# Patient Record
Sex: FEMALE | Race: WHITE | NOT HISPANIC OR LATINO | Employment: OTHER | URBAN - METROPOLITAN AREA
[De-identification: names, ages, dates, MRNs, and addresses within clinical notes are randomized per-mention and may not be internally consistent; named-entity substitution may affect disease eponyms.]

---

## 2017-01-16 DIAGNOSIS — I10 ESSENTIAL HYPERTENSION WITH GOAL BLOOD PRESSURE LESS THAN 140/90: Primary | ICD-10-CM

## 2017-01-16 NOTE — TELEPHONE ENCOUNTER
Atenolol 50 mg  Last Written Prescription Date: 06/29/16  Last Fill Quantity: 90, # refills: 1    Last Office Visit with [facility], [facility] or [facility] prescribing provider:  11/29/16   Future Office Visit:    NA    BP Readings from Last 3 Encounters:   11/29/16 138/68   09/06/16 132/60   08/26/16 122/82       Benazepril 20 mg          Last Written Prescription Date: 06/29/16  Last Fill Quantity: 180, # refills: 1  Last Office Visit with [facility], [facility] or [facility] prescribing provider: 11/29/16       POTASSIUM   Date Value Ref Range Status   10/26/2016 4.8 3.4 - 5.3 mmol/L Final     CREATININE   Date Value Ref Range Status   10/26/2016 1.13* 0.52 - 1.04 mg/dL Final     BP Readings from Last 3 Encounters:   11/29/16 138/68   09/06/16 132/60   08/26/16 122/82

## 2017-01-19 RX ORDER — ATENOLOL 50 MG/1
50 TABLET ORAL EVERY MORNING
Qty: 90 TABLET | Refills: 1 | Status: SHIPPED | OUTPATIENT
Start: 2017-01-19 | End: 2018-08-17

## 2017-01-19 RX ORDER — BENAZEPRIL HYDROCHLORIDE 20 MG/1
40 TABLET ORAL DAILY
Qty: 180 TABLET | Refills: 1 | Status: ON HOLD | OUTPATIENT
Start: 2017-01-19 | End: 2018-08-21

## 2017-01-19 NOTE — TELEPHONE ENCOUNTER
Prescription approved per [facility] Refill Protocol.  Pauline Coker, RN  Triage Nurse

## 2017-02-25 ENCOUNTER — OFFICE VISIT (OUTPATIENT)
Dept: URGENT CARE | Facility: URGENT CARE | Age: 82
End: 2017-02-25
Payer: MEDICARE

## 2017-02-25 VITALS
SYSTOLIC BLOOD PRESSURE: 118 MMHG | OXYGEN SATURATION: 98 % | TEMPERATURE: 98.9 F | HEART RATE: 66 BPM | DIASTOLIC BLOOD PRESSURE: 72 MMHG

## 2017-02-25 DIAGNOSIS — H61.21 IMPACTED CERUMEN OF RIGHT EAR: ICD-10-CM

## 2017-02-25 DIAGNOSIS — H69.93 ETD (EUSTACHIAN TUBE DYSFUNCTION), BILATERAL: Primary | ICD-10-CM

## 2017-02-25 PROCEDURE — 99213 OFFICE O/P EST LOW 20 MIN: CPT | Performed by: PHYSICIAN ASSISTANT

## 2017-02-25 RX ORDER — FLUTICASONE PROPIONATE 50 MCG
2 SPRAY, SUSPENSION (ML) NASAL DAILY
Qty: 1 BOTTLE | Refills: 3 | Status: SHIPPED | OUTPATIENT
Start: 2017-02-25 | End: 2018-08-17

## 2017-02-25 NOTE — MR AVS SNAPSHOT
"              After Visit Summary   2017    Delmis Quarles    MRN: [id number]           Patient Information     Date Of Birth          1924        Visit Information        Provider Department      2017 4:30 PM Tiffany Snyder PA-C [facility] [place]        Today's Diagnoses     ETD (eustachian tube dysfunction), bilateral    -  1    Impacted cerumen of right ear           Follow-ups after your visit        Who to contact     If you have questions or need follow up information about today's clinic visit or your schedule please contact [facility] directly at [phone].  Normal or non-critical lab and imaging results will be communicated to you by [employer], letter or phone within 4 business days after the clinic has received the results. If you do not hear from us within 7 days, please contact the clinic through [employer] or phone. If you have a critical or abnormal lab result, we will notify you by phone as soon as possible.  Submit refill requests through [employer] or call your pharmacy and they will forward the refill request to us. Please allow 3 business days for your refill to be completed.          Additional Information About Your Visit        MyChart Information     [employer] lets you send messages to your doctor, view your test results, renew your prescriptions, schedule appointments and more. To sign up, go to [email or web address] . Click on \"Log in\" on the left side of the screen, which will take you to the Welcome page. Then click on \"Sign up Now\" on the right side of the page.     You will be asked to enter the access code listed below, as well as some personal information. Please follow the directions to create your username and password.     Your access code is: [email or web address]  Expires: 2017 11:44 AM     Your access code will  in 90 days. If you need help or a new code, please call your [place] clinic or [phone].        Care EveryWhere ID     " This is your Care EveryWhere ID. This could be used by other organizations to access your [place] medical records  [id number]        Your Vitals Were     Pulse Temperature Pulse Oximetry             66 98.9  F (37.2  C) (Oral) 98%          Blood Pressure from Last 3 Encounters:   02/25/17 118/72   11/29/16 138/68   09/06/16 132/60    Weight from Last 3 Encounters:   11/29/16 157 lb 12.8 oz (71.6 kg)   09/06/16 157 lb 1.6 oz (71.3 kg)   08/26/16 156 lb (70.8 kg)              Today, you had the following     No orders found for display         Today's Medication Changes          These changes are accurate as of: 2/25/17 11:59 PM.  If you have any questions, ask your nurse or doctor.               These medicines have changed or have updated prescriptions.        Dose/Directions    * fluticasone 50 MCG/ACT spray   Commonly known as:  FLONASE   This may have changed:  Another medication with the same name was added. Make sure you understand how and when to take each.   Used for:  Post-nasal drainage   Changed by:  Rhonda Aj MD        Dose:  2 spray   Spray 2 sprays into both nostrils daily   Quantity:  1 Bottle   Refills:  3       * fluticasone 50 MCG/ACT spray   Commonly known as:  FLONASE   This may have changed:  You were already taking a medication with the same name, and this prescription was added. Make sure you understand how and when to take each.   Used for:  ETD (eustachian tube dysfunction), bilateral   Changed by:  Tiffany Snyder PA-C        Dose:  2 spray   Spray 2 sprays into both nostrils daily   Quantity:  1 Bottle   Refills:  3       * Notice:  This list has 2 medication(s) that are the same as other medications prescribed for you. Read the directions carefully, and ask your doctor or other care provider to review them with you.         Where to get your medicines      These medications were sent to [facility] [address] [place], [place] - [address]  [address],  [facility] [address]     Phone:  [phone]     fluticasone 50 MCG/ACT spray                Primary Care Provider Office Phone # Fax #    Rhonda Aj [phone]       [facility] [address]  [facility] [address]        Thank you!     Thank you for choosing [facility]  for your care. Our goal is always to provide you with excellent care. Hearing back from our patients is one way we can continue to improve our services. Please take a few minutes to complete the written survey that you may receive in the mail after your visit with us. Thank you!             Your Updated Medication List - Protect others around you: Learn how to safely use, store and throw away your medicines at www.disposemymeds.org.          This list is accurate as of: 2/25/17 11:59 PM.  Always use your most recent med list.                   Brand Name Dispense Instructions for use    aspirin 81 MG EC tablet     90 tablet    Take 1 tablet (81 mg) by mouth daily       atenolol 50 MG tablet    TENORMIN    90 tablet    Take 1 tablet (50 mg) by mouth every morning       benazepril 20 MG tablet    LOTENSIN    180 tablet    Take 2 tablets (40 mg) by mouth daily May split dose if desired or take them both at one time.       bumetanide 1 MG tablet    BUMEX    45 tablet    One half tablet by mouth daily       calcium + D 600-200 MG-UNIT Tabs   Generic drug:  calcium carbonate-vitamin D      Take 2 tablets by mouth daily.       estradiol 10 MCG Tabs vaginal tablet    VAGIFEM    24 tablet    Place 1 tablet (10 mcg) vaginally twice a week       fexofenadine 180 MG tablet    ALLEGRA    90 tablet    Take 1 tablet by mouth daily.       * fluticasone 50 MCG/ACT spray    FLONASE    1 Bottle    Spray 2 sprays into both nostrils daily       * fluticasone 50 MCG/ACT spray    FLONASE    1 Bottle    Spray 2 sprays into both nostrils daily       gabapentin 100 MG capsule    NEURONTIN    450 capsule    200 mg  in the morning and 100 mg at noon  200 mg in the evening       latanoprost 0.005 % ophthalmic solution    XALATAN     Place 1 drop Into the left eye At Bedtime.       MULTIVITAMIN PO      None Entered       OMEGA 3 PO      DAILY       omeprazole 20 MG CR capsule    priLOSEC    90 capsule    Take 1 capsule (20 mg) by mouth every morning ONE DAILY       ondansetron 4 MG ODT tab    ZOFRAN ODT    20 tablet    Take 1-2 tablets (4-8 mg) by mouth 3 times daily (before meals)       sodium chloride 0.65 % nasal spray    OCEAN    44 mL    Spray 1 spray into both nostrils daily as needed for congestion       spironolactone 25 MG tablet    ALDACTONE    90 tablet    Take 1 tablet (25 mg) by mouth daily (with lunch)       timolol 0.5 % ophthalmic solution    TIMOPTIC     Place 1 drop into both eyes daily       vitamin D 1000 UNITS capsule      2 CAPSULE EVERY DAY       * Notice:  This list has 2 medication(s) that are the same as other medications prescribed for you. Read the directions carefully, and ask your doctor or other care provider to review them with you.

## 2017-02-25 NOTE — NURSING NOTE
"Chief Complaint   Patient presents with     Urgent Care     Ear Problem     pt reports feeling head pressure/congestion, feels like her head is floating x 1 day.  Possible ear impaction.       Initial /72 (BP Location: Right arm, Patient Position: Chair, Cuff Size: Adult Regular)  Pulse 66  Temp 98.9  F (37.2  C) (Oral)  SpO2 98% Estimated body mass index is 28.86 kg/(m^2) as calculated from the following:    Height as of 2/19/16: 5' 2\" (1.575 m).    Weight as of 11/29/16: 157 lb 12.8 oz (71.6 kg).  Medication Reconciliation: complete      Monica Ford CMA                                2/25/2017 4:47 PM     "

## 2017-03-02 NOTE — PROGRESS NOTES
SUBJECTIVE:  Delmis Quarles is a 89 year old female who presents with bilateral ear fullness and pressure for 1 day(s).  Feels slightly congested in her head.  Denies dizziness, HA or vision changes.  Wants to make sure that no ear infection or wax  Severity: mild   Timing:gradual onset and still present  Additional symptoms include none.      History of recurrent otitis: no    Past Medical History   Diagnosis Date     Arthritis      Blood transfusion      Chronic gastric ulcer without mention of hemorrhage, perforation, without mention of obstruction 5/5/2006     EGD, NSAID induced; PPI indefinitely,      Diffuse cystic mastopathy      Embolism and thrombosis of unspecified site 1992     Post phlebitic syndrome; Jobst stocking     Essential hypertension, benign 1972     Neuropathy (H)      FEET AND HANDS     Osteoporosis, unspecified 1/00     Dexa done in [place], [place]; Fosamax started 1/00     Other lymphedema      Stricture and stenosis of esophagus 2000     dilitation      Current Outpatient Prescriptions   Medication Sig Dispense Refill     fluticasone (FLONASE) 50 MCG/ACT spray Spray 2 sprays into both nostrils daily 1 Bottle 3     benazepril (LOTENSIN) 20 MG tablet Take 2 tablets (40 mg) by mouth daily May split dose if desired or take them both at one time. 180 tablet 1     atenolol (TENORMIN) 50 MG tablet Take 1 tablet (50 mg) by mouth every morning 90 tablet 1     bumetanide (BUMEX) 1 MG tablet One half tablet by mouth daily 45 tablet 1     spironolactone (ALDACTONE) 25 MG tablet Take 1 tablet (25 mg) by mouth daily (with lunch) 90 tablet 1     ondansetron (ZOFRAN ODT) 4 MG ODT tab Take 1-2 tablets (4-8 mg) by mouth 3 times daily (before meals) 20 tablet 1     gabapentin (NEURONTIN) 100 MG capsule 200 mg in the morning and 100 mg at noon  200 mg in the evening 450 capsule 3     fluticasone (FLONASE) 50 MCG/ACT nasal spray Spray 2 sprays into both nostrils daily 1 Bottle 3     estradiol (VAGIFEM) 10 MCG  TABS Place 1 tablet (10 mcg) vaginally twice a week 24 tablet 1     sodium chloride (OCEAN) 0.65 % nasal spray Spray 1 spray into both nostrils daily as needed for congestion 44 mL 1     omeprazole (PRILOSEC) 20 MG capsule Take 1 capsule (20 mg) by mouth every morning ONE DAILY 90 capsule 2     timolol (TIMOPTIC) 0.5 % ophthalmic solution Place 1 drop into both eyes daily       aspirin 81 MG EC tablet Take 1 tablet (81 mg) by mouth daily 90 tablet 3     latanoprost (XALATAN) 0.005 % ophthalmic solution Place 1 drop Into the left eye At Bedtime.       Calcium Carbonate-Vitamin D (CALCIUM + D) 600-200 MG-UNIT per tablet Take 2 tablets by mouth daily.       fexofenadine (ALLEGRA) 180 MG tablet Take 1 tablet by mouth daily. 90 tablet 9     MULTIVITAMIN OR None Entered       OMEGA 3 PO DAILY       VITAMIN D 1000 UNIT PO CAPS 2 CAPSULE EVERY DAY  0     Social History   Substance Use Topics     Smoking status: Never Smoker     Smokeless tobacco: Never Used     Alcohol use No       ROS:   Review of systems negative except as stated above.    OBJECTIVE:  /72 (BP Location: Right arm, Patient Position: Chair, Cuff Size: Adult Regular)  Pulse 66  Temp 98.9  F (37.2  C) (Oral)  SpO2 98%   EXAM:  The right TM is not visualized secondary to cerumen     The right auditory canal is obstructed with cerumen  The left TM is air/fluid interface  The left auditory canal is normal and without drainage, edema or erythema  Oropharynx exam is normal: no lesions, erythema, adenopathy or exudate.  GENERAL: no acute distress  EYES: EOMI,  PERRL, conjunctiva clear  NECK: supple, non-tender to palpation, no adenopathy noted  RESP: lungs clear to auscultation - no rales, rhonchi or wheezes  CV: regular rates and rhythm, normal S1 S2, no murmur noted  SKIN: no suspicious lesions or rashes     EAR WASH:  Right ear wash performed and TM with no signs of infection.  Clear fluid noted in right ear    ASSESSMENT:  Cerumen impaction and Eustachin  tube dysfunction    PLAN:  No signs of infection. Supportive cares, hot packs and steam.  Fu with PCP as needed .

## 2017-03-31 DIAGNOSIS — N95.2 ATROPHIC VAGINITIS: ICD-10-CM

## 2017-03-31 RX ORDER — ESTRADIOL 10 UG/1
10 INSERT VAGINAL
Qty: 24 TABLET | Refills: 1 | Status: SHIPPED | OUTPATIENT
Start: 2017-04-03 | End: 2017-09-17

## 2017-03-31 NOTE — TELEPHONE ENCOUNTER
estradiol (VAGIFEM) 10 MCG      Last Written Prescription Date: 10/17/16  Last Fill Quantity: 24, # refills: 1  Last Office Visit with [facility], [facility] or [facility] prescribing provider: 11/29/16       BP Readings from Last 3 Encounters:   02/25/17 118/72   11/29/16 138/68   09/06/16 132/60     Date of last Breast Exam: 11/27/12

## 2017-03-31 NOTE — TELEPHONE ENCOUNTER
Prescription approved per [facility] Refill Protocol.  Pauline Coker, RN  Triage Nurse

## 2017-05-14 DIAGNOSIS — K22.2 STRICTURE AND STENOSIS OF ESOPHAGUS: ICD-10-CM

## 2017-05-15 NOTE — TELEPHONE ENCOUNTER
omeprazole (PRILOSEC) 20 MG      Last Written Prescription Date: 6/29/16  Last Fill Quantity: 90,  # refills: 2   Last Office Visit with [facility], [facility] or [facility] prescribing provider: 11/29/16

## 2017-05-16 NOTE — TELEPHONE ENCOUNTER
Prescription approved per [facility] Refill Protocol.    Kaila Lyons RN

## 2017-06-06 DIAGNOSIS — G62.9 NEUROPATHY: ICD-10-CM

## 2017-06-07 RX ORDER — GABAPENTIN 100 MG/1
CAPSULE ORAL
Qty: 450 CAPSULE | Refills: 1 | OUTPATIENT
Start: 2017-06-07

## 2017-06-07 NOTE — TELEPHONE ENCOUNTER
Gabapentin 100 mg 2 at am, 1 at noon & 2 at pm      Last Written Prescription Date:  11/29/16  Last Fill Quantity: 450,   # refills: 3  Last Office Visit with [facility], [facility] or [facility] prescribing provider: 02/25/17  Future Office visit:       Routing refill request to provider for review/approval because:  Drug not on the [facility], [facility] or  [facility] refill protocol or controlled substance    Beth, RN  Triage Nurse

## 2017-06-13 RX ORDER — GABAPENTIN 100 MG/1
CAPSULE ORAL
Qty: 450 CAPSULE | Refills: 1 | Status: SHIPPED | OUTPATIENT
Start: 2017-06-13 | End: 2024-01-01

## 2017-06-26 DIAGNOSIS — I10 ESSENTIAL HYPERTENSION WITH GOAL BLOOD PRESSURE LESS THAN 140/90: ICD-10-CM

## 2017-06-27 NOTE — TELEPHONE ENCOUNTER
bumetanide (BUMEX) 1 MG tablet      Last Written Prescription Date: 12/26/16  Last Fill Quantity: 45, # refills: 1  Last Office Visit with [facility], [facility] or [facility] prescribing provider: 11/29/16       Potassium   Date Value Ref Range Status   10/26/2016 4.8 3.4 - 5.3 mmol/L Final     Creatinine   Date Value Ref Range Status   10/26/2016 1.13 (H) 0.52 - 1.04 mg/dL Final     BP Readings from Last 3 Encounters:   02/25/17 118/72   11/29/16 138/68   09/06/16 132/60

## 2017-06-29 RX ORDER — BUMETANIDE 1 MG/1
TABLET ORAL
Qty: 45 TABLET | Refills: 0 | Status: SHIPPED | OUTPATIENT
Start: 2017-06-29 | End: 2017-09-26

## 2017-06-29 NOTE — TELEPHONE ENCOUNTER
Medication is being filled for 1 time refill only due to:  Patient needs to be seen because it has been more than one year since last visit. LOV for this medication 6/29/2016    Kaila Lyons RN

## 2017-06-29 NOTE — TELEPHONE ENCOUNTER
Patient is seeing a Doctor at the facility that she is now living at and they will continue to prescribe her medications.  -Bridie Waggoner

## 2017-08-23 DIAGNOSIS — R09.82 POST-NASAL DRAINAGE: ICD-10-CM

## 2017-08-25 RX ORDER — FLUTICASONE PROPIONATE 50 MCG
SPRAY, SUSPENSION (ML) NASAL
Qty: 16 ML | Refills: 3 | Status: SHIPPED | OUTPATIENT
Start: 2017-08-25 | End: 2017-12-17

## 2017-08-25 NOTE — TELEPHONE ENCOUNTER
Medication Detail      Disp Refills Start End CARLOS   fluticasone (FLONASE) 50 MCG/ACT nasal spray 1 Bottle 3 11/29/2016  No   Sig: Spray 2 sprays into both nostrils daily       Last Office Visit with [facility], [facility] or [facility] prescribing provider: 11/29/16

## 2017-08-25 NOTE — TELEPHONE ENCOUNTER
Prescription approved per [facility] Refill Protocol.  Pauline Coker, RN  Triage Nurse

## 2017-09-17 DIAGNOSIS — N95.2 ATROPHIC VAGINITIS: ICD-10-CM

## 2017-09-18 NOTE — TELEPHONE ENCOUNTER
estradiol (VAGIFEM) 10 MCG TABS vaginal      Last Written Prescription Date: 4/3/17  Last Fill Quantity: 24, # refills: 1  Last Office Visit with [facility], [facility] or [facility] prescribing provider: 11/29/16       BP Readings from Last 3 Encounters:   02/25/17 118/72   11/29/16 138/68   09/06/16 132/60     Date of last Breast Exam: 11/27/12

## 2017-09-19 RX ORDER — ESTRADIOL 10 UG/1
INSERT VAGINAL
Qty: 24 TABLET | Refills: 0 | Status: SHIPPED | OUTPATIENT
Start: 2017-09-19 | End: 2024-01-01

## 2017-09-26 DIAGNOSIS — I10 ESSENTIAL HYPERTENSION WITH GOAL BLOOD PRESSURE LESS THAN 140/90: ICD-10-CM

## 2017-09-27 NOTE — TELEPHONE ENCOUNTER
Routing refill request to provider for review/approval because:  Lucero given x1 and patient did not follow up, please advise    Please advise.     Carlee FONTANA RN, BSN, PHN  Annalise Barajas RN

## 2017-09-27 NOTE — TELEPHONE ENCOUNTER
bumetanide (BUMEX) 1 MG tablet      Last Written Prescription Date: 6/29/2017  Last Fill Quantity: 45, # refills: 0  Last Office Visit with [facility], [facility] or [facility] prescribing provider: 11/29/2016       Potassium   Date Value Ref Range Status   10/26/2016 4.8 3.4 - 5.3 mmol/L Final     Creatinine   Date Value Ref Range Status   10/26/2016 1.13 (H) 0.52 - 1.04 mg/dL Final     BP Readings from Last 3 Encounters:   02/25/17 118/72   11/29/16 138/68   09/06/16 132/60

## 2017-09-28 RX ORDER — BUMETANIDE 1 MG/1
TABLET ORAL
Qty: 45 TABLET | Refills: 0 | Status: SHIPPED | OUTPATIENT
Start: 2017-09-28 | End: 2017-10-20

## 2017-09-28 NOTE — TELEPHONE ENCOUNTER
/Lat OV with PCP 1// but did not address diuretic use. That was addressed at  She is over due for appt..   appt. Will provide #10 tablets/ which is 20 days supply of meds.  Please assist with scheduling appt.  Unable to fill at mail order since it is a partial supply. Rx printed. Please check where pt would like local rx sent.

## 2017-09-28 NOTE — TELEPHONE ENCOUNTER
Looks like caps lock issue in last note.  Intended to authorize #10. Rx printed ( controlled Rx).  Will change quantity to #10 as intended and please check with pt as to local pharmacy to use.  Thanks.

## 2017-09-29 NOTE — TELEPHONE ENCOUNTER
Called patient to ask where she would like this sent to for #10.  She would like sent to [facility] in [facility] on [address].  This is sent.   Pauline Coker, RN  Triage Nurse

## 2017-10-20 DIAGNOSIS — I10 ESSENTIAL HYPERTENSION WITH GOAL BLOOD PRESSURE LESS THAN 140/90: ICD-10-CM

## 2017-10-23 RX ORDER — BUMETANIDE 1 MG/1
TABLET ORAL
Qty: 45 TABLET | Refills: 0 | Status: SHIPPED | OUTPATIENT
Start: 2017-10-23 | End: 2018-01-23

## 2017-10-23 NOTE — TELEPHONE ENCOUNTER
Prescription approved per [facility] Refill Protocol.  90 days x1 - coming up on one year for labs and one year for OV is next month.  Noted in pharmacy comments.

## 2017-11-12 DIAGNOSIS — K22.2 STRICTURE AND STENOSIS OF ESOPHAGUS: ICD-10-CM

## 2017-11-14 NOTE — TELEPHONE ENCOUNTER
Routing refill request to provider for review/approval because:  Osteoporosis is on problems list, per [facility] protocol, route to provider for review.     Pt is coming due for OV with PCP, pharmacy note added to script.     Marianela Arteaga RN -- [facility]

## 2017-11-16 ENCOUNTER — HOSPITAL ENCOUNTER (EMERGENCY)
Facility: CLINIC | Age: 82
Discharge: HOME | End: 2017-11-16
Attending: EMERGENCY MEDICINE | Admitting: EMERGENCY MEDICINE
Payer: MEDICARE

## 2017-11-16 ENCOUNTER — APPOINTMENT (OUTPATIENT)
Dept: CT IMAGING | Facility: CLINIC | Age: 82
End: 2017-11-16
Attending: EMERGENCY MEDICINE
Payer: MEDICARE

## 2017-11-16 ENCOUNTER — APPOINTMENT (OUTPATIENT)
Dept: GENERAL RADIOLOGY | Facility: CLINIC | Age: 82
End: 2017-11-16
Attending: EMERGENCY MEDICINE
Payer: MEDICARE

## 2017-11-16 VITALS
HEART RATE: 68 BPM | DIASTOLIC BLOOD PRESSURE: 68 MMHG | TEMPERATURE: 98 F | SYSTOLIC BLOOD PRESSURE: 164 MMHG | RESPIRATION RATE: 16 BRPM | OXYGEN SATURATION: 99 %

## 2017-11-16 DIAGNOSIS — S70.02XA CONTUSION OF LEFT HIP, INITIAL ENCOUNTER: ICD-10-CM
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
## 2017-11-16 DIAGNOSIS — I10 ESSENTIAL HYPERTENSION: ICD-10-CM

## 2017-11-16 LAB
ALBUMIN UR-MCNC: NEGATIVE MG/DL
ANION GAP SERPL CALCULATED.3IONS-SCNC: 4 MMOL/L (ref 3–14)
APPEARANCE UR: NORMAL
BASOPHILS # BLD AUTO: 0.1 10E9/L (ref 0–0.2)
BASOPHILS NFR BLD AUTO: 1.1 %
BILIRUB UR QL STRIP: NEGATIVE
BUN SERPL-MCNC: 21 MG/DL (ref 7–30)
CALCIUM SERPL-MCNC: 8.9 MG/DL (ref 8.5–10.1)
CHLORIDE SERPL-SCNC: 98 MMOL/L (ref 94–109)
CO2 SERPL-SCNC: 30 MMOL/L (ref 20–32)
COLOR UR AUTO: YELLOW
CREAT SERPL-MCNC: 0.88 MG/DL (ref 0.52–1.04)
DIFFERENTIAL METHOD BLD: NORMAL
EOSINOPHIL # BLD AUTO: 0.4 10E9/L (ref 0–0.7)
EOSINOPHIL NFR BLD AUTO: 5.5 %
ERYTHROCYTE [DISTWIDTH] IN BLOOD BY AUTOMATED COUNT: 14.2 % (ref 10–15)
GFR SERPL CREATININE-BSD FRML MDRD: 60 ML/MIN/1.7M2
GLUCOSE BLDC GLUCOMTR-MCNC: 109 MG/DL (ref 70–99)
GLUCOSE SERPL-MCNC: 101 MG/DL (ref 70–99)
GLUCOSE UR STRIP-MCNC: NEGATIVE MG/DL
HCT VFR BLD AUTO: 38.5 % (ref 35–47)
HGB BLD-MCNC: 12.7 G/DL (ref 11.7–15.7)
HGB UR QL STRIP: NEGATIVE
IMM GRANULOCYTES # BLD: 0 10E9/L (ref 0–0.4)
IMM GRANULOCYTES NFR BLD: 0.4 %
INR PPP: 1.07 (ref 0.86–1.14)
INTERPRETATION ECG - MUSE: NORMAL
KETONES UR STRIP-MCNC: NEGATIVE MG/DL
LACTATE BLD-SCNC: 0.8 MMOL/L (ref 0.7–2)
LEUKOCYTE ESTERASE UR QL STRIP: NEGATIVE
LYMPHOCYTES # BLD AUTO: 1.4 10E9/L (ref 0.8–5.3)
LYMPHOCYTES NFR BLD AUTO: 18.6 %
MCH RBC QN AUTO: 29.9 PG (ref 26.5–33)
MCHC RBC AUTO-ENTMCNC: 33 G/DL (ref 31.5–36.5)
MCV RBC AUTO: 91 FL (ref 78–100)
MONOCYTES # BLD AUTO: 0.6 10E9/L (ref 0–1.3)
MONOCYTES NFR BLD AUTO: 8.6 %
NEUTROPHILS # BLD AUTO: 4.8 10E9/L (ref 1.6–8.3)
NEUTROPHILS NFR BLD AUTO: 65.8 %
NITRATE UR QL: NEGATIVE
NRBC # BLD AUTO: 0 10*3/UL
NRBC BLD AUTO-RTO: 0 /100
PH UR STRIP: 7 PH (ref 5–7)
PLATELET # BLD AUTO: 192 10E9/L (ref 150–450)
POTASSIUM SERPL-SCNC: 3.8 MMOL/L (ref 3.4–5.3)
RBC # BLD AUTO: 4.25 10E12/L (ref 3.8–5.2)
RBC #/AREA URNS AUTO: 1 /HPF (ref 0–2)
SODIUM SERPL-SCNC: 132 MMOL/L (ref 133–144)
SOURCE: NORMAL
SP GR UR STRIP: 1.01 (ref 1–1.03)
TROPONIN I BLD-MCNC: 0.01 UG/L (ref 0–0.1)
TSH SERPL DL<=0.005 MIU/L-ACNC: 2.72 MU/L (ref 0.4–4)
UROBILINOGEN UR STRIP-MCNC: 0 MG/DL (ref 0–2)
WBC # BLD AUTO: 7.3 10E9/L (ref 4–11)
WBC #/AREA URNS AUTO: 1 /HPF (ref 0–2)

## 2017-11-16 PROCEDURE — 87040 BLOOD CULTURE FOR BACTERIA: CPT | Mod: 91 | Performed by: EMERGENCY MEDICINE

## 2017-11-16 PROCEDURE — 73552 X-RAY EXAM OF FEMUR 2/>: CPT | Mod: LT

## 2017-11-16 PROCEDURE — 70450 CT HEAD/BRAIN W/O DYE: CPT

## 2017-11-16 PROCEDURE — 71010 XR CHEST 1 VW: CPT

## 2017-11-16 PROCEDURE — 84484 ASSAY OF TROPONIN QUANT: CPT

## 2017-11-16 PROCEDURE — 72170 X-RAY EXAM OF PELVIS: CPT

## 2017-11-16 PROCEDURE — 84443 ASSAY THYROID STIM HORMONE: CPT | Performed by: EMERGENCY MEDICINE

## 2017-11-16 PROCEDURE — 99285 EMERGENCY DEPT VISIT HI MDM: CPT | Mod: 25

## 2017-11-16 PROCEDURE — 93005 ELECTROCARDIOGRAM TRACING: CPT

## 2017-11-16 PROCEDURE — 83605 ASSAY OF LACTIC ACID: CPT | Performed by: EMERGENCY MEDICINE

## 2017-11-16 PROCEDURE — 85610 PROTHROMBIN TIME: CPT | Performed by: EMERGENCY MEDICINE

## 2017-11-16 PROCEDURE — 80048 BASIC METABOLIC PNL TOTAL CA: CPT | Performed by: EMERGENCY MEDICINE

## 2017-11-16 PROCEDURE — 36415 COLL VENOUS BLD VENIPUNCTURE: CPT | Performed by: EMERGENCY MEDICINE

## 2017-11-16 PROCEDURE — 85025 COMPLETE CBC W/AUTO DIFF WBC: CPT | Performed by: EMERGENCY MEDICINE

## 2017-11-16 PROCEDURE — 00000146 ZZHCL STATISTIC GLUCOSE BY METER IP

## 2017-11-16 PROCEDURE — 81001 URINALYSIS AUTO W/SCOPE: CPT | Performed by: EMERGENCY MEDICINE

## 2017-11-16 RX ORDER — HYDRALAZINE HYDROCHLORIDE 20 MG/ML
10 INJECTION INTRAMUSCULAR; INTRAVENOUS ONCE
Status: DISCONTINUED | OUTPATIENT
Start: 2017-11-16 | End: 2017-11-16 | Stop reason: HOSPADM

## 2017-11-16 RX ORDER — LIDOCAINE 40 MG/G
CREAM TOPICAL
Status: DISCONTINUED | OUTPATIENT
Start: 2017-11-16 | End: 2017-11-16 | Stop reason: HOSPADM

## 2017-11-16 ASSESSMENT — ENCOUNTER SYMPTOMS
BACK PAIN: 0
FACIAL ASYMMETRY: 0
VOMITING: 0
DIARRHEA: 0
ARTHRALGIAS: 0

## 2017-11-16 NOTE — ED NOTES
Bed: ED12  Expected date: 11/16/17  Expected time: 7:10 AM  Means of arrival: Ambulance  Comments:  DASHA 89 YO f

## 2017-11-16 NOTE — ED AVS SNAPSHOT
[facility] Emergency Department    [address] [place] [address]    Phone:  [phone]    Fax:  [phone]                                       Delmis Quarles   MRN: [id number]    Department:  [facility] Emergency Department   Date of Visit:  11/16/2017           Patient Information     Date Of Birth          9/5/1924        Your diagnoses for this visit were:     Fall, initial encounter     Essential hypertension     Contusion of left hip, initial encounter        You were seen by Delgado Arrington MD.      Follow-up Information     Follow up with Rhonda Aj MD. Schedule an appointment as soon as possible for a visit in 5 days.    Specialty:  Internal Medicine    Contact information:    [address] [place] [address]  [phone]          Discharge Instructions       Discharge Instructions  Trauma    You were seen today for an injury due to some kind of trauma (crash, fall, etc.).  Some injuries may not show up until after you leave the Emergency Department.  It is important that you pay attention to these instructions and follow-up with your regular doctor as instructed.    Return to the Emergency Department right away if:    You have abdominal pain or bruises, chest pain, pain in a new area, or pain that is getting worse.    You get short of breath.    You develop a fever over 101 degrees.    You have weakness in your arms or legs.    You faint or you are very lightheaded.    You have any new symptoms, you are feeling weak or unusually ill, or something worries you.     Injuries to the brain are possible with any accident.  Return right away if you have confusion, vomiting more than once, difficulty walking or a headache that is getting worse. Bring a child or a person who can t talk back if they seem to be behaving in an abnormal way.      MORE INFORMATION:    General Injuries:    Aches and pains are usually worse the day after your  accident, but should not be severe, and should start getting better after that. Aches and pains are common in the neck and back.    Injuries from your accident may prevent you from working.  Follow-up with your regular doctor to get a work note and to find out how long you will not be able to work.    Pain medications or your injuries may make it unsafe for you to drive or operate machinery.    Use ice to injured areas for the first one or two days. Apply a bag of ice wrapped in a cloth for about 15 minutes at a time. You can do this as often as once an hour. Do not sleep with an ice pack, since it can burn you.     You can use non-prescription pain medicine, like Tylenol  (acetaminophen), Advil  (ibuprofen), Motrin  (ibuprofen), Nuprin  (ibuprofen) if your emergency doctor or your own doctor told you this is okay. Tylenol  (acetaminophen) is in many prescription medicines and non-prescription medicines--check all of your medicines to be sure you aren t taking more than 3000 mg per day.    Limit your activity for at least one or two days. Avoid doing things that hurt.    You need to see your doctor if any injured area is not back to normal in 1 week.    Car Accident:    If you have been on a backboard or had a neck collar on, this may make you stiff and sore.  This should get better in 1-2 days.  Return to the Emergency Department if the pain or discomfort is severe or gets worse.    Be careful of shards of glass on your body or in your belongings.    Fractures, Sprains, and Strains:    Return to the Emergency Department right away if your injured area gets more painful, if the splint or dressing seems to be too tight, if it gets numb or tingly past the injury, or if the area past the injury gets pale, blue, or cold.    Use your crutches if you were given them today. Don t put weight on the injured area until the pain is gone.    Keep the injured area above the level of your heart while laying or sitting down.  This  well help lessen the swelling (puffiness) and the pain.    You may use an elastic bandage (Ace  Wrap) if it makes you more comfortable. Wrap it just tight enough to provide mild compression, and loosen it if you get swelling past the bandage.    Note about X-rays: If you had x-rays done today, they were read by your emergency physician. They will also be read later by a radiologist. We will contact you if the radiologist thinks they show something different than the emergency physician did. Remember that there are some fractures (breaks in the bone) that can t be seen right away. Even if your x-rays today were normal, you must see your doctor in clinic to re-check.     Splints:    A splint put on in the Emergency Department is temporary. Your regular doctor or orthopedic doctor will remove it, and replace it with a cast or boot if needed.    Keep the splint dry. Cover it with a plastic bag when you wash. Even with a plastic bag, you still can t get in water or let water get right on it. If it does get wet, you should come back or see your doctor to have it replaced.    Do not put objects inside the splint to scratch.    If there is an elastic bandage (Ace  Wrap) holding the splint on this may be loosened a little to relieve pressure or pain.  If pain continues return to the Emergency Department right away.    Return if the splint starts cutting into your skin.    Do not remove your splint by yourself unless told to by your doctor. You can t take it off and put it back on again.     Wounds:    Infections can follow many injuries. Watch for fevers, redness spreading from the wound, pus or stitches that open up. Return here or see your doctor if these happen.    There can always be glass, wood, dirt or other things in any wound.  They won t always show up even on x-rays.  If a wound doesn t heal, this may be why, and it is important to follow-up with your regular doctor. Small pieces of glass or other materials may  work their way out on their own.    Cuts or scrapes may start to bleed after leaving the Emergency Department.  If this happens, hold pressure on the bleeding area with a clean cloth or put pressure over the bandage.  If the bleeding doesn t stop after you use constant pressure for   hour, you should return to the Emergency Department for further treatment.    Any bandage or dressing put on here should be removed in 12-24 hours, or as your doctor instructs. Remove the dressing sooner if it seems too tight or painful, or if it is getting numb, tingly, or pale past the dressing.    After you take off the dressing, wash the cut or scrape with soap and water once or twice a day.    Apply ointment like Bacitracin  (polypeptide antibiotic) to scrapes or cuts, and keep them covered with a Band-Aid  or gauze if possible, until they heal up or until your stitches are taken out.    Dermabond  or Steri-Strips  should be left alone and will come off by themselves.  Dissolving stitches should go away or fall out within about a week.    Regular stitches need to be taken out by your doctor in clinic.  Call today and schedule an appointment.  Leave your stitches in for as long as you were told today.    Most injuries are preventable!  As your local emergency physicians, we encourage you to:    Wear your seat belt.    Do not talk on your cell phone while driving.    Do not read or send text messages while driving.    Wear a bike or motorcycle helmet.    Wear a helmet while skiing and snowboarding.    Wear personal flotation devices at all times while on the water.    Always have your child in a car seat.    Do not allow children less than 12 years old to ride in the front seat.    Go to the CDC website to find more information on preventing injures:  http://www.cdc.gov/injury/index.html    If you were given a prescription for medicine here today, be sure to read all of the information (including the package insert) that comes with  your prescription.  This will include important information about the medicine, its side effects, and any warnings that you need to know about.  The pharmacist who fills the prescription can provide more information and answer questions you may have about the medicine.  If you have questions or concerns that the pharmacist cannot address, please call or return to the Emergency Department.     Opioid Medication Information    Pain medications are among the most commonly prescribed medicines, so we are including this information for all our patients. If you did not receive pain medication or get a prescription for pain medicine, you can ignore it.     You may have been given a prescription for an opioid (narcotic) pain medicine and/or have received a pain medicine while here in the Emergency Department. These medicines can make you drowsy or impaired. You must not drive, operate dangerous equipment, or engage in any other dangerous activities while taking these medications. If you drive while taking these medications, you could be arrested for DUI, or driving under the influence. Do not drink any alcohol while you are taking these medications.     Opioid pain medications can cause addiction. If you have a history of chemical dependency of any type, you are at a higher risk of becoming addicted to pain medications.  Only take these prescribed medications to treat your pain when all other options have been tried. Take it for as short a time and as few doses as possible. Store your pain pills in a secure place, as they are frequently stolen and provide a dangerous opportunity for children or visitors in your house to start abusing these powerful medications. We will not replace any lost or stolen medicine.  As soon as your pain is better, you should flush all your remaining medication.     Many prescription pain medications contain Tylenol  (acetaminophen), including Vicodin , Tylenol #3 , Norco , Lortab , and Percocet .   You should not take any extra pills of Tylenol  if you are using these prescription medications or you can get very sick.  Do not ever take more than 3000 mg of acetaminophen in any 24 hour period.    All opioids tend to cause constipation. Drink plenty of water and eat foods that have a lot of fiber, such as fruits, vegetables, prune juice, apple juice and high fiber cereal.  Take a laxative if you don t move your bowels at least every other day. Miralax , Milk of Magnesia, Colace , or Senna  can be used to keep you regular.      Remember that you can always come back to the Emergency Department if you are not able to see your regular doctor in the amount of time listed above, if you get any new symptoms, or if there is anything that worries you.      Discharge Instructions  Hypertension - High Blood Pressure    During you visit to the Emergency Department, your blood pressure was higher than the recommended blood pressure.  This may be related to stress, pain, medication or other temporary conditions. In these cases, your blood pressure may return to normal on its own. If you have a history of high blood pressure, you may need to have your doctor adjust your medications. Sometimes, your high measurement here may indicate that you have developed high blood pressure that will stay high unless it is treated. Sudden very high blood pressure can cause problems, but usually high blood pressure causes problems over months to years.      Blood pressure is almost never lowered in the Emergency Department, because studies have shown that lowering blood pressure too quickly is much more dangerous than leaving it alone.    You need to follow up with your doctor in 1-3 days to get your blood pressure rechecked.     Return to the Emergency Department if you start to have:    A severe headache.    Chest pain.    Shortness of breath.    Weakness or numbness that affects one part of the body.    Confusion.    Vision  changes.    Significant swelling of legs and/or eyes.    A reaction to any medication started in the Emergency Department.    What can I do to help myself?    Avoid alcohol.    Take any blood pressure medicine that you are prescribed.    Get a good night s sleep.    Lower your salt intake.    Exercise.    Lose weight.    Manage stress.    If blood pressure medication was started in the Emergency Department:    The medicine may not have an immediate effect. The body and brain determine what blood pressure you have. The medicine s job is to retrain the body s  thermostat  to a lower blood pressure.    You will need to follow up with your doctor to see how this medicine is working for you.  If you were given a prescription for medicine here today, be sure to read all of the information (including the package insert) that comes with your prescription.  This will include important information about the medicine, its side effects, and any warnings that you need to know about.  The pharmacist who fills the prescription can provide more information and answer questions you may have about the medicine.  If you have questions or concerns that the pharmacist cannot address, please call or return to the Emergency Department.   Opioid Medication Information    Pain medications are among the most commonly prescribed medicines, so we are including this information for all our patients. If you did not receive pain medication or get a prescription for pain medicine, you can ignore it.     You may have been given a prescription for an opioid (narcotic) pain medicine and/or have received a pain medicine while here in the Emergency Department. These medicines can make you drowsy or impaired. You must not drive, operate dangerous equipment, or engage in any other dangerous activities while taking these medications. If you drive while taking these medications, you could be arrested for DUI, or driving under the influence. Do not drink  any alcohol while you are taking these medications.     Opioid pain medications can cause addiction. If you have a history of chemical dependency of any type, you are at a higher risk of becoming addicted to pain medications.  Only take these prescribed medications to treat your pain when all other options have been tried. Take it for as short a time and as few doses as possible. Store your pain pills in a secure place, as they are frequently stolen and provide a dangerous opportunity for children or visitors in your house to start abusing these powerful medications. We will not replace any lost or stolen medicine.  As soon as your pain is better, you should flush all your remaining medication.     Many prescription pain medications contain Tylenol  (acetaminophen), including Vicodin , Tylenol #3 , Norco , Lortab , and Percocet .  You should not take any extra pills of Tylenol  if you are using these prescription medications or you can get very sick.  Do not ever take more than 3000 mg of acetaminophen in any 24 hour period.    All opioids tend to cause constipation. Drink plenty of water and eat foods that have a lot of fiber, such as fruits, vegetables, prune juice, apple juice and high fiber cereal.  Take a laxative if you don t move your bowels at least every other day. Miralax , Milk of Magnesia, Colace , or Senna  can be used to keep you regular.      Remember that you can always come back to the Emergency Department if you are not able to see your regular doctor in the amount of time listed above, if you get any new symptoms, or if there is anything that worries you.        24 Hour Appointment Hotline       To make an appointment at any [facility], call [phone] ([phone]). If you don't have a family doctor or clinic, we will help you find one. [facility] clinics are conveniently located to serve the needs of you and your family.             Review of your medicines      Our records show that  you are taking the medicines listed below. If these are incorrect, please call your family doctor or clinic.        Dose / Directions Last dose taken    aspirin 81 MG EC tablet   Dose:  81 mg   Quantity:  90 tablet        Take 1 tablet (81 mg) by mouth daily   Refills:  3        atenolol 50 MG tablet   Commonly known as:  TENORMIN   Dose:  50 mg   Quantity:  90 tablet        Take 1 tablet (50 mg) by mouth every morning   Refills:  1        benazepril 20 MG tablet   Commonly known as:  LOTENSIN   Dose:  40 mg   Quantity:  180 tablet        Take 2 tablets (40 mg) by mouth daily May split dose if desired or take them both at one time.   Refills:  1        bumetanide 1 MG tablet   Commonly known as:  BUMEX   Quantity:  45 tablet        TAKE 1/2 TABLET BY MOUTH DAILY   Refills:  0        calcium + D 600-200 MG-UNIT Tabs   Dose:  2 tablet   Generic drug:  calcium carbonate-vitamin D        Take 2 tablets by mouth daily.   Refills:  0        estradiol 10 MCG Tabs vaginal tablet   Commonly known as:  VAGIFEM   Quantity:  24 tablet        INSERT 1 TABLET VAGINALLY TWICE WEEKLY   Refills:  0        fexofenadine 180 MG tablet   Commonly known as:  ALLEGRA   Dose:  1 tablet   Quantity:  90 tablet        Take 1 tablet by mouth daily.   Refills:  9        * fluticasone 50 MCG/ACT spray   Commonly known as:  FLONASE   Dose:  2 spray   Quantity:  1 Bottle        Spray 2 sprays into both nostrils daily   Refills:  3        * fluticasone 50 MCG/ACT spray   Commonly known as:  FLONASE   Quantity:  16 mL        INSTILL 2 SPRAYS INTO BOTH NOSTRILS ONCE DAILY   Refills:  3        gabapentin 100 MG capsule   Commonly known as:  NEURONTIN   Quantity:  450 capsule        200 mg in the morning and 100 mg at noon  200 mg in the evening   Refills:  1        latanoprost 0.005 % ophthalmic solution   Commonly known as:  XALATAN   Dose:  1 drop        Place 1 drop Into the left eye At Bedtime.   Refills:  0        MULTIVITAMIN PO        None  Entered   Refills:  0        OMEGA 3 PO        DAILY   Refills:  0        omeprazole 20 MG CR capsule   Commonly known as:  priLOSEC   Quantity:  90 capsule        TAKE 1 CAPSULE EVERY MORNING DAILY   Refills:  0        ondansetron 4 MG ODT tab   Commonly known as:  ZOFRAN ODT   Dose:  4-8 mg   Quantity:  20 tablet        Take 1-2 tablets (4-8 mg) by mouth 3 times daily (before meals)   Refills:  1        sodium chloride 0.65 % nasal spray   Commonly known as:  OCEAN   Dose:  1 spray   Quantity:  44 mL        Spray 1 spray into both nostrils daily as needed for congestion   Refills:  1        spironolactone 25 MG tablet   Commonly known as:  ALDACTONE   Dose:  25 mg   Quantity:  90 tablet        Take 1 tablet (25 mg) by mouth daily (with lunch)   Refills:  1        timolol 0.5 % ophthalmic solution   Commonly known as:  TIMOPTIC   Dose:  1 drop        Place 1 drop into both eyes daily   Refills:  0        vitamin D 1000 UNITS capsule        2 CAPSULE EVERY DAY   Refills:  0        * Notice:  This list has 2 medication(s) that are the same as other medications prescribed for you. Read the directions carefully, and ask your doctor or other care provider to review them with you.            Procedures and tests performed during your visit     Procedure/Test Number of Times Performed    Basic metabolic panel 1    Blood culture 2    CBC with platelets differential 1    CT Head w/o Contrast 1    Cardiac Continuous Monitoring 1    EKG 12 lead 1    Femur XR,  2 views, left 1    Glucose by meter 1    Glucose monitor nursing POCT 1    INR 1    ISTAT troponin nursing POCT 1    Lactic acid whole blood 1    Pelvis XR, 1-2 views 1    Peripheral IV catheter 1    Pulse oximetry nursing 1    TSH 1    Troponin POCT 1    UA with Microscopic 1    Vital signs 1    XR Chest 1 View 1      Orders Needing Specimen Collection     None      Pending Results     Date and Time Order Name Status Description    11/16/2017 0800 Blood culture In  process     11/16/2017 0800 Blood culture In process             Pending Culture Results     Date and Time Order Name Status Description    11/16/2017 0800 Blood culture In process     11/16/2017 0800 Blood culture In process             Pending Results Instructions     If you had any lab results that were not finalized at the time of your Discharge, you can call the ED Lab Result RN at [phone]. You will be contacted by this team for any positive Lab results or changes in treatment. The nurses are available 7 days a week from 10A to 6:30P.  You can leave a message 24 hours per day and they will return your call.        Test Results From Your Hospital Stay        11/16/2017  8:20 AM      Component Results     Component Value Ref Range & Units Status    WBC 7.3 4.0 - 11.0 10e9/L Final    RBC Count 4.25 3.8 - 5.2 10e12/L Final    Hemoglobin 12.7 11.7 - 15.7 g/dL Final    Hematocrit 38.5 35.0 - 47.0 % Final    MCV 91 78 - 100 fl Final    MCH 29.9 26.5 - 33.0 pg Final    MCHC 33.0 31.5 - 36.5 g/dL Final    RDW 14.2 10.0 - 15.0 % Final    Platelet Count 192 150 - 450 10e9/L Final    Diff Method Automated Method  Final    % Neutrophils 65.8 % Final    % Lymphocytes 18.6 % Final    % Monocytes 8.6 % Final    % Eosinophils 5.5 % Final    % Basophils 1.1 % Final    % Immature Granulocytes 0.4 % Final    Nucleated RBCs 0 0 /100 Final    Absolute Neutrophil 4.8 1.6 - 8.3 10e9/L Final    Absolute Lymphocytes 1.4 0.8 - 5.3 10e9/L Final    Absolute Monocytes 0.6 0.0 - 1.3 10e9/L Final    Absolute Eosinophils 0.4 0.0 - 0.7 10e9/L Final    Absolute Basophils 0.1 0.0 - 0.2 10e9/L Final    Abs Immature Granulocytes 0.0 0 - 0.4 10e9/L Final    Absolute Nucleated RBC 0.0  Final         11/16/2017  8:33 AM      Component Results     Component Value Ref Range & Units Status    INR 1.07 0.86 - 1.14 Final         11/16/2017  8:43 AM      Component Results     Component Value Ref Range & Units Status    Sodium 132 (L) 133 - 144 mmol/L  Final    Potassium 3.8 3.4 - 5.3 mmol/L Final    Chloride 98 94 - 109 mmol/L Final    Carbon Dioxide 30 20 - 32 mmol/L Final    Anion Gap 4 3 - 14 mmol/L Final    Glucose 101 (H) 70 - 99 mg/dL Final    Urea Nitrogen 21 7 - 30 mg/dL Final    Creatinine 0.88 0.52 - 1.04 mg/dL Final    GFR Estimate 60 (L) >60 mL/min/1.7m2 Final    Non  GFR Calc    GFR Estimate If Black 73 >60 mL/min/1.7m2 Final    African American GFR Calc    Calcium 8.9 8.5 - 10.1 mg/dL Final         11/16/2017  8:48 AM      Component Results     Component Value Ref Range & Units Status    TSH 2.72 0.40 - 4.00 mU/L Final         11/16/2017 10:18 AM      Component Results     Component Value Ref Range & Units Status    Color Urine Yellow  Final    Appearance Urine Slightly Cloudy  Final    Glucose Urine Negative NEG^Negative mg/dL Final    Bilirubin Urine Negative NEG^Negative Final    Ketones Urine Negative NEG^Negative mg/dL Final    Specific Gravity Urine 1.009 1.003 - 1.035 Final    Blood Urine Negative NEG^Negative Final    pH Urine 7.0 5.0 - 7.0 pH Final    Protein Albumin Urine Negative NEG^Negative mg/dL Final    Urobilinogen mg/dL 0.0 0.0 - 2.0 mg/dL Final    Nitrite Urine Negative NEG^Negative Final    Leukocyte Esterase Urine Negative NEG^Negative Final    Source Catheterized Urine  Final    WBC Urine 1 0 - 2 /HPF Final    RBC Urine 1 0 - 2 /HPF Final         11/16/2017  8:40 AM      Component Results     Component Value Ref Range & Units Status    Lactic Acid 0.8 0.7 - 2.0 mmol/L Final         11/16/2017  8:41 AM         11/16/2017  8:38 AM               11/16/2017  9:44 AM      Narrative     CT SCAN OF THE HEAD WITHOUT CONTRAST   11/16/2017 9:39 AM     HISTORY: Fall. Possible stroke.    TECHNIQUE:  Axial images of the head and coronal reformations without  IV contrast material. Radiation dose for this scan was reduced using  automated exposure control, adjustment of the mA and/or kV according  to patient size, or iterative  reconstruction technique.    COMPARISON: MRI 11/7/2012    FINDINGS:  There is generalized atrophy of the brain.  There is low  attenuation in the white matter of the cerebral hemispheres consistent  with sequelae of small vessel ischemic disease. There is no evidence  of intracranial hemorrhage, mass, acute infarct or anomaly.     The visualized portions of the sinuses and mastoids appear normal.  There is no evidence of trauma.        Impression     IMPRESSION:   1. No acute abnormality.  2.  Atrophy of the brain.  White matter changes consistent with  sequelae of small vessel ischemic disease. This is unchanged.      KOURTNEY YUN MD         11/16/2017 10:01 AM      Narrative     XR PELVIS 1/2 VW 11/16/2017 9:55 AM    COMPARISON: 5/10/2014    HISTORY: Fall, tenderness.        Impression     IMPRESSION: Partially visualized lumbar spinal fusion hardware. No  fractures are seen. Hip joint spaces are preserved.    NATALIE HEATH MD         11/16/2017 10:01 AM      Narrative     XR FEMUR LT 2 VW 11/16/2017 9:56 AM    COMPARISON: None.    HISTORY: Fall, tenderness.        Impression     IMPRESSION: Partially visualized left total knee arthroplasty. No  fractures are seen in the left femur. Hip joint space is preserved.    NATALIE HEATH MD         11/16/2017  8:31 AM      Component Results     Component Value Ref Range & Units Status    Glucose 109 (H) 70 - 99 mg/dL Final         11/16/2017  8:36 AM      Component Results     Component Value Ref Range & Units Status    Troponin I 0.01 0.00 - 0.10 ug/L Final         11/16/2017 10:00 AM      Narrative     XR CHEST 1 VW 11/16/2017 9:55 AM    COMPARISON: CT dated 10/18/2005    HISTORY: Fall.        Impression     IMPRESSION: Cardiac silhouette and pulmonary vasculature are within  normal limits. No focal airspace disease, pleural effusion or  pneumothorax.    NATALIE HEATH MD                Clinical Quality Measure: Blood Pressure Screening     Your blood pressure was  "checked while you were in the emergency department today. The last reading we obtained was  BP: 164/73 . Please read the guidelines below about what these numbers mean and what you should do about them.  If your systolic blood pressure (the top number) is less than 120 and your diastolic blood pressure (the bottom number) is less than 80, then your blood pressure is normal. There is nothing more that you need to do about it.  If your systolic blood pressure (the top number) is 120-139 or your diastolic blood pressure (the bottom number) is 80-89, your blood pressure may be higher than it should be. You should have your blood pressure rechecked within a year by a primary care provider.  If your systolic blood pressure (the top number) is 140 or greater or your diastolic blood pressure (the bottom number) is 90 or greater, you may have high blood pressure. High blood pressure is treatable, but if left untreated over time it can put you at risk for heart attack, stroke, or kidney failure. You should have your blood pressure rechecked by a primary care provider within the next 4 weeks.  If your provider in the emergency department today gave you specific instructions to follow-up with your doctor or provider even sooner than that, you should follow that instruction and not wait for up to 4 weeks for your follow-up visit.        Thank you for choosing [facility]       Thank you for choosing [facility] for your care. Our goal is always to provide you with excellent care. Hearing back from our patients is one way we can continue to improve our services. Please take a few minutes to complete the written survey that you may receive in the mail after you visit with us. Thank you!        [employer] Information     [employer] lets you send messages to your doctor, view your test results, renew your prescriptions, schedule appointments and more. To sign up, go to [email or web address] . Click on \"Log in\" on the left side of the screen, " "which will take you to the Welcome page. Then click on \"Sign up Now\" on the right side of the page.     You will be asked to enter the access code listed below, as well as some personal information. Please follow the directions to create your username and password.     Your access code is: [email or web address]  Expires: 2018 10:47 AM     Your access code will  in 90 days. If you need help or a new code, please call your [facility] clinic or [phone].        Care EveryWhere ID     This is your Care EveryWhere ID. This could be used by other organizations to access your [facility] medical records  [id number]        Equal Access to Services     ELLIOT HALEY : Anthony Henry, zainab wise, dipesh costa, margareth kennedy. So [facility] [phone].    ATENCIÓN: Si habla español, tiene a mcdaniels disposición servicios gratuitos de asistencia lingüística. Llame al [phone].    We comply with applicable federal civil rights laws and [place] laws. We do not discriminate on the basis of race, color, national origin, age, disability, sex, sexual orientation, or gender identity.            After Visit Summary       This is your record. Keep this with you and show to your community pharmacist(s) and doctor(s) at your next visit.                  "

## 2017-11-16 NOTE — ED PROVIDER NOTES
History     Chief Complaint:  Fall    The history is provided by the patient.      Delmis Quarles is a 89 year old female who presents via EMS due to a fall. Just prior to arrival the patient was brushing her hair this morning when her foot slipped and she fell onto her buttock. Denies hitting her head. Patient ambulates with a walker at baseline and is unable to get herself up from the floor so she contacted EMS. She denies any syncope or symptoms prior to the fall including lightheadedness, dizziness, pain, or others. She denies recent vomiting, diarrhea, back pain, increased pain in her shoulder where she has chronic pain, numbness in extremities, or other complaint.     Of note, patient's blood pressure is elevated here and she did take her Benazepril after the fall.     Allergies:  Trospium  Codeine  Penicillins  Sulfa Drugs     Medications:    Omeprazole  Bumex  Vagifem  Flonase  Neurontin  Lotensin   Atenolol  Aldactone  Aspirin 81 mg    Past Medical History:    Arthritis  Blood transfusion  Chronic gastric ulcer  Diffuse cystic mastopathy  Embolism   HTN  Neuropathy   Osteoporosis   Lymphedema  Stricture and stenosis of esophagus  Median nerve injury  Tibialsis posterior tendonitis   OA of left glenohumeral joint   Gait difficulty      Past Surgical History:    Amputate toe bilaterally  Left knee arthroplasty  Right knee arthroplasty   Appendectomy   ROCK/BSO  Fusion thoracic lumbar anterior 2 levels  Flex sigmoidoscopy   T&A  RFA of right vein  Fusion posterior spine, lumbar  Repair hammer toe    Family History:    CAD  CHF    Social History:  Presents via EMS   Lives in independent living facility  Tobacco use: Never  Alcohol use: Negative  PCP: Rhonda Aj    Marital Status:      Review of Systems   Gastrointestinal: Negative for diarrhea and vomiting.   Musculoskeletal: Negative for arthralgias and back pain.   Neurological: Negative for syncope and facial asymmetry.   All other systems  reviewed and are negative.    Physical Exam     Patient Vitals for the past 24 hrs:   BP Temp Temp src Pulse Heart Rate Resp SpO2   11/16/17 1015 164/73 - - - 62 14 96 %   11/16/17 0900 175/78 - - - - - -   11/16/17 0845 180/77 - - - 65 13 94 %   11/16/17 0830 178/77 - - - 65 13 94 %   11/16/17 0815 190/84 - - - 66 13 95 %   11/16/17 0800 - - - - 67 15 96 %   11/16/17 0745 (!) 197/91 - - - 70 11 95 %   11/16/17 0732 185/78 98  F (36.7  C) Oral 68 - 16 96 %      Physical Exam  General: The patient is alert, in no respiratory distress.    HENT: Mucous membranes moist.    Cardiovascular: Regular rate and rhythm. Good pulses in all four extremities. Normal capillary refill and skin turgor.     Respiratory: Lungs are clear. No nasal flaring. No retractions. No wheezing, no crackles.    Gastrointestinal: Abdomen soft. No abdominal tenderness. No guarding, no rebound. No palpable hernias.     Musculoskeletal: No gross deformity. Tenderness to left hip.     Skin: No rashes or petechiae.     Neurologic: The patient is alert and oriented x3. GCS 15. No testable cranial nerve deficit. Follows commands with clear and appropriate speech. Gives appropriate answers. Good strength in all extremities. No gross neurologic deficit. Pupils are round and reactive. No meningismus.     Lymphatic: No cervical adenopathy. No lower extremity swelling.    Psychiatric: The patient is non-tearful.     Emergency Department Course   ECG (07:35:03):  Rate 68 bpm. OH interval 228. QRS duration 86. QT/QTc 390/414. P-R-T axes 89 31 36. Sinus rhythm with 1st degree AV block. Otherwise normal ECG. Agree with computer interpretation. Interpreted at 0740 by Delgado Arrington MD.     Imaging:  Radiographic findings were communicated with the patient who voiced understanding of the findings.    XR Pelvis, 1-2 views:  IMPRESSION: Partially visualized lumbar spinal fusion hardware. No fractures are seen. Hip joint spaces are preserved.    XR Femur, 2  views, left:  IMPRESSION: Partially visualized left total knee arthroplasty. No fractures are seen in the left femur. Hip joint space is preserved.    XR Chest, 2 views:  IMPRESSION: Cardiac silhouette and pulmonary vasculature are within normal limits. No focal airspace disease, pleural effusion or pneumothorax.    CT Head without contrast:  IMPRESSION:   1. No acute abnormality.  2.  Atrophy of the brain.  White matter changes consistent with sequelae of small vessel ischemic disease. This is unchanged.    Imaging independently reviewed and agree with radiologist interpretation.      Laboratory:  CBC: WNL (WBC 7.3, HGB 12.7, )   BMP:  (L), Glucose 101 (H), GFR 60 (L) ow WNL (Creatinine 0.88)     Recent Labs  Lab 11/16/17  0823 11/16/17  0808   GLC  --  101*   *  --       0823: Troponin POCT: 0.01   INR: 1.07  TSH: 2.72  Lactic Acid: 0.8   Blood culture x2: pending    UA: Negative     Emergency Department Course:  The patient arrived in the emergency department via EMS.  Past medical records, nursing notes, and vitals reviewed.  0743: I performed an exam of the patient and obtained history, as documented above.  0751: The patient was offered pain medications while in the ED; they are declining this at this time.    IV inserted and blood drawn.   The patient was sent for a XR while in the emergency department, findings above.   1019: I rechecked the patient. Explained findings to patient.   Patient ambulated in the department without difficulty.  I personally reviewed the laboratory results with the Patient and answered all related questions prior to discharge.    1037: I rechecked the patient. Findings and plan explained to the Patient. Patient discharged home with instructions regarding supportive care, medications, and reasons to return. The importance of close follow-up was reviewed.      Impression & Plan    Medical Decision Making:  Delmis Quarles is a 89 year old female that is quite  younger than her stated age and quite healthy. She said that she was brushing her hair when she fell, I questioned her about any antecedent symptoms that would suggest this was a syncopal episode, cardiac related event, PE, neurologic event such as TIA, vasovagal episode, but it does not sound like she blacked out at all and did not feel bad until she fell. She said that her feet had slipped out from underneath of her which has happened previously. My main concern was looking for associated injuries and uncovering any underlying neurologic, cardiac, or other serious events. The patient had no pain but did have some mild tenderness of her left hip so I therefore imaged this area with x-ray. This was negative. I examined the area carefully and could not find any outward bruising and found most likely contusion or strain was the culprit behind this. The patient's CT scan and x-rays were negative. Labs were quite reassuring and I did discus results. She was able to walk here with her walker and after a period of observation the patient and her family member felt comfortable taking her home. She was discharged in good condition with strict instructions to return if she develops any new or worsening symptoms but I do not feel there is underlying cardiac or neurologic event behind this.     Diagnosis:    ICD-10-CM    1. Fall, initial encounter W19.XXXA    2. Essential hypertension I10    3. Contusion of left hip, initial encounter S70.02XA        Disposition:  Discharged to home with plan as outlined.    I, Julio De Leon, am serving as a scribe at 7:43 AM on 11/16/2017 to document services personally performed by Delgado Arrington MD based on my observations and the provider's statements to me.    11/16/2017   [facility] EMERGENCY DEPARTMENT       Delgado Arrington MD  11/16/17 0349

## 2017-11-16 NOTE — DISCHARGE INSTRUCTIONS
Discharge Instructions  Trauma    You were seen today for an injury due to some kind of trauma (crash, fall, etc.).  Some injuries may not show up until after you leave the Emergency Department.  It is important that you pay attention to these instructions and follow-up with your regular doctor as instructed.    Return to the Emergency Department right away if:    You have abdominal pain or bruises, chest pain, pain in a new area, or pain that is getting worse.    You get short of breath.    You develop a fever over 101 degrees.    You have weakness in your arms or legs.    You faint or you are very lightheaded.    You have any new symptoms, you are feeling weak or unusually ill, or something worries you.     Injuries to the brain are possible with any accident.  Return right away if you have confusion, vomiting more than once, difficulty walking or a headache that is getting worse. Bring a child or a person who can t talk back if they seem to be behaving in an abnormal way.      MORE INFORMATION:    General Injuries:    Aches and pains are usually worse the day after your accident, but should not be severe, and should start getting better after that. Aches and pains are common in the neck and back.    Injuries from your accident may prevent you from working.  Follow-up with your regular doctor to get a work note and to find out how long you will not be able to work.    Pain medications or your injuries may make it unsafe for you to drive or operate machinery.    Use ice to injured areas for the first one or two days. Apply a bag of ice wrapped in a cloth for about 15 minutes at a time. You can do this as often as once an hour. Do not sleep with an ice pack, since it can burn you.     You can use non-prescription pain medicine, like Tylenol  (acetaminophen), Advil  (ibuprofen), Motrin  (ibuprofen), Nuprin  (ibuprofen) if your emergency doctor or your own doctor told you this is okay. Tylenol  (acetaminophen) is in  many prescription medicines and non-prescription medicines--check all of your medicines to be sure you aren t taking more than 3000 mg per day.    Limit your activity for at least one or two days. Avoid doing things that hurt.    You need to see your doctor if any injured area is not back to normal in 1 week.    Car Accident:    If you have been on a backboard or had a neck collar on, this may make you stiff and sore.  This should get better in 1-2 days.  Return to the Emergency Department if the pain or discomfort is severe or gets worse.    Be careful of shards of glass on your body or in your belongings.    Fractures, Sprains, and Strains:    Return to the Emergency Department right away if your injured area gets more painful, if the splint or dressing seems to be too tight, if it gets numb or tingly past the injury, or if the area past the injury gets pale, blue, or cold.    Use your crutches if you were given them today. Don t put weight on the injured area until the pain is gone.    Keep the injured area above the level of your heart while laying or sitting down.  This well help lessen the swelling (puffiness) and the pain.    You may use an elastic bandage (Ace  Wrap) if it makes you more comfortable. Wrap it just tight enough to provide mild compression, and loosen it if you get swelling past the bandage.    Note about X-rays: If you had x-rays done today, they were read by your emergency physician. They will also be read later by a radiologist. We will contact you if the radiologist thinks they show something different than the emergency physician did. Remember that there are some fractures (breaks in the bone) that can t be seen right away. Even if your x-rays today were normal, you must see your doctor in clinic to re-check.     Splints:    A splint put on in the Emergency Department is temporary. Your regular doctor or orthopedic doctor will remove it, and replace it with a cast or boot if  needed.    Keep the splint dry. Cover it with a plastic bag when you wash. Even with a plastic bag, you still can t get in water or let water get right on it. If it does get wet, you should come back or see your doctor to have it replaced.    Do not put objects inside the splint to scratch.    If there is an elastic bandage (Ace  Wrap) holding the splint on this may be loosened a little to relieve pressure or pain.  If pain continues return to the Emergency Department right away.    Return if the splint starts cutting into your skin.    Do not remove your splint by yourself unless told to by your doctor. You can t take it off and put it back on again.     Wounds:    Infections can follow many injuries. Watch for fevers, redness spreading from the wound, pus or stitches that open up. Return here or see your doctor if these happen.    There can always be glass, wood, dirt or other things in any wound.  They won t always show up even on x-rays.  If a wound doesn t heal, this may be why, and it is important to follow-up with your regular doctor. Small pieces of glass or other materials may work their way out on their own.    Cuts or scrapes may start to bleed after leaving the Emergency Department.  If this happens, hold pressure on the bleeding area with a clean cloth or put pressure over the bandage.  If the bleeding doesn t stop after you use constant pressure for   hour, you should return to the Emergency Department for further treatment.    Any bandage or dressing put on here should be removed in 12-24 hours, or as your doctor instructs. Remove the dressing sooner if it seems too tight or painful, or if it is getting numb, tingly, or pale past the dressing.    After you take off the dressing, wash the cut or scrape with soap and water once or twice a day.    Apply ointment like Bacitracin  (polypeptide antibiotic) to scrapes or cuts, and keep them covered with a Band-Aid  or gauze if possible, until they heal up or  until your stitches are taken out.    Dermabond  or Steri-Strips  should be left alone and will come off by themselves.  Dissolving stitches should go away or fall out within about a week.    Regular stitches need to be taken out by your doctor in clinic.  Call today and schedule an appointment.  Leave your stitches in for as long as you were told today.    Most injuries are preventable!  As your local emergency physicians, we encourage you to:    Wear your seat belt.    Do not talk on your cell phone while driving.    Do not read or send text messages while driving.    Wear a bike or motorcycle helmet.    Wear a helmet while skiing and snowboarding.    Wear personal flotation devices at all times while on the water.    Always have your child in a car seat.    Do not allow children less than 12 years old to ride in the front seat.    Go to the CDC website to find more information on preventing injures:  http://www.cdc.gov/injury/index.html    If you were given a prescription for medicine here today, be sure to read all of the information (including the package insert) that comes with your prescription.  This will include important information about the medicine, its side effects, and any warnings that you need to know about.  The pharmacist who fills the prescription can provide more information and answer questions you may have about the medicine.  If you have questions or concerns that the pharmacist cannot address, please call or return to the Emergency Department.     Opioid Medication Information    Pain medications are among the most commonly prescribed medicines, so we are including this information for all our patients. If you did not receive pain medication or get a prescription for pain medicine, you can ignore it.     You may have been given a prescription for an opioid (narcotic) pain medicine and/or have received a pain medicine while here in the Emergency Department. These medicines can make you drowsy  or impaired. You must not drive, operate dangerous equipment, or engage in any other dangerous activities while taking these medications. If you drive while taking these medications, you could be arrested for DUI, or driving under the influence. Do not drink any alcohol while you are taking these medications.     Opioid pain medications can cause addiction. If you have a history of chemical dependency of any type, you are at a higher risk of becoming addicted to pain medications.  Only take these prescribed medications to treat your pain when all other options have been tried. Take it for as short a time and as few doses as possible. Store your pain pills in a secure place, as they are frequently stolen and provide a dangerous opportunity for children or visitors in your house to start abusing these powerful medications. We will not replace any lost or stolen medicine.  As soon as your pain is better, you should flush all your remaining medication.     Many prescription pain medications contain Tylenol  (acetaminophen), including Vicodin , Tylenol #3 , Norco , Lortab , and Percocet .  You should not take any extra pills of Tylenol  if you are using these prescription medications or you can get very sick.  Do not ever take more than 3000 mg of acetaminophen in any 24 hour period.    All opioids tend to cause constipation. Drink plenty of water and eat foods that have a lot of fiber, such as fruits, vegetables, prune juice, apple juice and high fiber cereal.  Take a laxative if you don t move your bowels at least every other day. Miralax , Milk of Magnesia, Colace , or Senna  can be used to keep you regular.      Remember that you can always come back to the Emergency Department if you are not able to see your regular doctor in the amount of time listed above, if you get any new symptoms, or if there is anything that worries you.      Discharge Instructions  Hypertension - High Blood Pressure    During you visit to  the Emergency Department, your blood pressure was higher than the recommended blood pressure.  This may be related to stress, pain, medication or other temporary conditions. In these cases, your blood pressure may return to normal on its own. If you have a history of high blood pressure, you may need to have your doctor adjust your medications. Sometimes, your high measurement here may indicate that you have developed high blood pressure that will stay high unless it is treated. Sudden very high blood pressure can cause problems, but usually high blood pressure causes problems over months to years.      Blood pressure is almost never lowered in the Emergency Department, because studies have shown that lowering blood pressure too quickly is much more dangerous than leaving it alone.    You need to follow up with your doctor in 1-3 days to get your blood pressure rechecked.     Return to the Emergency Department if you start to have:    A severe headache.    Chest pain.    Shortness of breath.    Weakness or numbness that affects one part of the body.    Confusion.    Vision changes.    Significant swelling of legs and/or eyes.    A reaction to any medication started in the Emergency Department.    What can I do to help myself?    Avoid alcohol.    Take any blood pressure medicine that you are prescribed.    Get a good night s sleep.    Lower your salt intake.    Exercise.    Lose weight.    Manage stress.    If blood pressure medication was started in the Emergency Department:    The medicine may not have an immediate effect. The body and brain determine what blood pressure you have. The medicine s job is to retrain the body s  thermostat  to a lower blood pressure.    You will need to follow up with your doctor to see how this medicine is working for you.  If you were given a prescription for medicine here today, be sure to read all of the information (including the package insert) that comes with your prescription.   This will include important information about the medicine, its side effects, and any warnings that you need to know about.  The pharmacist who fills the prescription can provide more information and answer questions you may have about the medicine.  If you have questions or concerns that the pharmacist cannot address, please call or return to the Emergency Department.   Opioid Medication Information    Pain medications are among the most commonly prescribed medicines, so we are including this information for all our patients. If you did not receive pain medication or get a prescription for pain medicine, you can ignore it.     You may have been given a prescription for an opioid (narcotic) pain medicine and/or have received a pain medicine while here in the Emergency Department. These medicines can make you drowsy or impaired. You must not drive, operate dangerous equipment, or engage in any other dangerous activities while taking these medications. If you drive while taking these medications, you could be arrested for DUI, or driving under the influence. Do not drink any alcohol while you are taking these medications.     Opioid pain medications can cause addiction. If you have a history of chemical dependency of any type, you are at a higher risk of becoming addicted to pain medications.  Only take these prescribed medications to treat your pain when all other options have been tried. Take it for as short a time and as few doses as possible. Store your pain pills in a secure place, as they are frequently stolen and provide a dangerous opportunity for children or visitors in your house to start abusing these powerful medications. We will not replace any lost or stolen medicine.  As soon as your pain is better, you should flush all your remaining medication.     Many prescription pain medications contain Tylenol  (acetaminophen), including Vicodin , Tylenol #3 , Norco , Lortab , and Percocet .  You should not take  any extra pills of Tylenol  if you are using these prescription medications or you can get very sick.  Do not ever take more than 3000 mg of acetaminophen in any 24 hour period.    All opioids tend to cause constipation. Drink plenty of water and eat foods that have a lot of fiber, such as fruits, vegetables, prune juice, apple juice and high fiber cereal.  Take a laxative if you don t move your bowels at least every other day. Miralax , Milk of Magnesia, Colace , or Senna  can be used to keep you regular.      Remember that you can always come back to the Emergency Department if you are not able to see your regular doctor in the amount of time listed above, if you get any new symptoms, or if there is anything that worries you.

## 2017-11-16 NOTE — ED AVS SNAPSHOT
[facility] Emergency Department    [address]    [facility] [address]    Phone:  [phone]    Fax:  [phone]                                       Delmis Quarles   MRN: [id number]    Department:  [facility] Emergency Department   Date of Visit:  11/16/2017           After Visit Summary Signature Page     I have received my discharge instructions, and my questions have been answered. I have discussed any challenges I see with this plan with the nurse or doctor.    ..........................................................................................................................................  Patient/Patient Representative Signature      ..........................................................................................................................................  Patient Representative Print Name and Relationship to Patient    ..................................................               ................................................  Date                                            Time    ..........................................................................................................................................  Reviewed by Signature/Title    ...................................................              ..............................................  Date                                                            Time

## 2017-11-17 ENCOUNTER — CARE COORDINATION (OUTPATIENT)
Dept: CARE COORDINATION | Facility: CLINIC | Age: 82
End: 2017-11-17

## 2017-11-17 NOTE — PROGRESS NOTES
Clinic Care Coordination Contact  Care Coordination Communication    Clinical Data: Patient was seen through [facility] ED 11/16/2017 with diagnosis of Fall, Initial Encounter.       Per ED Provider Notes-   Impression & Plan    Medical Decision Making:  Delmis Quarles is a 89 year old female that is quite younger than her stated age and quite healthy. She said that she was brushing her hair when she fell, I questioned her about any antecedent symptoms that would suggest this was a syncopal episode, cardiac related event, PE, neurologic event such as TIA, vasovagal episode, but it does not sound like she blacked out at all and did not feel bad until she fell. She said that her feet had slipped out from underneath of her which has happened previously. My main concern was looking for associated injuries and uncovering any underlying neurologic, cardiac, or other serious events. The patient had no pain but did have some mild tenderness of her left hip so I therefore imaged this area with x-ray. This was negative. I examined the area carefully and could not find any outward bruising and found most likely contusion or strain was the culprit behind this. The patient's CT scan and x-rays were negative. Labs were quite reassuring and I did discus results. She was able to walk here with her walker and after a period of observation the patient and her family member felt comfortable taking her home. She was discharged in good condition with strict instructions to return if she develops any new or worsening symptoms but I do not feel there is underlying cardiac or neurologic event behind this.      Diagnosis:      ICD-10-CM     1. Fall, initial encounter W19.XXXA     2. Essential hypertension I10     3. Contusion of left hip, initial encounter S70.02XA           Disposition:  Discharged to home with plan as outlined.     Julio HERNANDEZ, am serving as a scribe at 7:43 AM on 11/16/2017 to document services personally performed by  "Delgado Arrington MD based on my observations and the provider's statements to me.    11/16/2017   [facility] EMERGENCY DEPARTMENT     Delgado Arrington MD  11/16/17 1421  ----------------------------------------------------------------------------------------------------------------------------------------------------------------------------------      1.  Patient Contact:   Introduced self and role of care coordination.     Discharge instructions were reviewed with patient/caregiver.       She has some slight pain today, post-fall, but planned to apply ice and utilize APAP for relief.       Patient reports that she is NO LONGER followed by Dr. Aj (past PCP) through [facility] Emergency Department.   She resides at [facility] and is followed by a gerontological provider by the name of \"Kesha\" (last name not known). Patient reports that it is easier for her to see this provider through [facility] versus having her children take the time off of work to bring her in to the clinic, but she misses Dr. Aj and the support staff.   Kesha sees the patient on a monthly basis (typically on a Friday) for routine follow up.    Kesha is scheduled to see the patient TODAY - 11/17/2017.       She reports that her BP was \"quite high\" in the ED but she didn't notice any change in how she felt.     CCRN advised patient to discuss BP with [facility] provider today during her visit.   She agreed with plan.     BP Readings from Last 6 Encounters:   11/16/17 164/68   02/25/17 118/72   11/29/16 138/68   09/06/16 132/60   08/26/16 122/82   06/29/16 130/64     She denies any other questions, concerns or needs from writer at this time.  She thanked writer for the outreach today.   No ongoing Care Coordination needs identified.     Placing on DECLINE status with Care Coordination at this time.       2.   Care Team Conversations   CCRN outreached to [facility] ([facility] [place] [[phone]], left VM " requesting update on the provider who follows patient at facility.   Awaiting return call.        Diana Contreras, JILLIAN  [facility]  Clinic Care Coordinator - [place] and [place] Locations   Direct:  [phone] (voicemail available)

## 2017-11-22 LAB
BACTERIA SPEC CULT: NO GROWTH
BACTERIA SPEC CULT: NO GROWTH
Lab: NORMAL
Lab: NORMAL
SPECIMEN SOURCE: NORMAL
SPECIMEN SOURCE: NORMAL

## 2017-12-11 DIAGNOSIS — N95.2 ATROPHIC VAGINITIS: ICD-10-CM

## 2017-12-13 NOTE — TELEPHONE ENCOUNTER
Estradiol      Last Written Prescription Date: 9/19/2017  Last Fill Quantity: 24, # refills: 0  Last Office Visit with [facility], [facility] or [facility] prescribing provider: 11/29/2016       BP Readings from Last 3 Encounters:   11/16/17 164/68   02/25/17 118/72   11/29/16 138/68     Date of last Breast Exam: 11/27/2012    Routing refill request to provider for review/approval because:  Patient needs to be seen because it has been more than 1 year since last office visit.    Please advise if you would like patient to continue using.     Carlee FONTANA, RN, BSN, PHN  [facility] JILLIAN

## 2017-12-14 RX ORDER — ESTRADIOL 10 UG/1
INSERT VAGINAL
Qty: 24 TABLET | Refills: 0 | OUTPATIENT
Start: 2017-12-14

## 2017-12-14 NOTE — TELEPHONE ENCOUNTER
Called patient to help schedule an appointment. She lives in   A facility now, and will be having them fill her medications from now on.  Sent back this request as declined, new provider.    Pauline Coker, RN  Triage Nurse

## 2017-12-14 NOTE — TELEPHONE ENCOUNTER
Pt seen over one year ago; she is overdue for appt.  Appt needed in order to renew medications. Also, note recent evaluation in UC and BLOOD PRESSURE is elevated. Please contact pt. To help coordinate appt.

## 2017-12-17 DIAGNOSIS — R09.82 POST-NASAL DRAINAGE: ICD-10-CM

## 2017-12-18 NOTE — TELEPHONE ENCOUNTER
Requested Prescriptions   Pending Prescriptions Disp Refills     fluticasone (FLONASE) 50 MCG/ACT spray [Pharmacy Med Name: FLUTICASONE PROP 50 MCG SPRAY]  Last Written Prescription Date:  2/25/17  Last Fill Quantity: 1,  # refills: 3   Last Office Visit with [facility], [facility] or [facility] prescribing provider:  11/29/16   Future Office Visit:      16 mL 3     Sig: INSTILL 2 SPRAYS INTO BOTH NOSTRILS ONCE DAILY    Inhaled Steroids Protocol Failed    12/17/2017 10:12 AM       Failed - Recent or future visit with authorizing provider's specialty    Patient had office visit in the last year or has a visit in the next 30 days with authorizing provider.  See chart review.              Passed - Patient is age 12 or older

## 2017-12-19 RX ORDER — FLUTICASONE PROPIONATE 50 MCG
SPRAY, SUSPENSION (ML) NASAL
Qty: 16 ML | Refills: 0 | Status: SHIPPED | OUTPATIENT
Start: 2017-12-19 | End: 2024-01-01

## 2017-12-19 NOTE — TELEPHONE ENCOUNTER
Medication is being filled for 1 time refill only due to:  Patient needs to be seen because it has been more than one year since last visit. Call to schedule annual office visit    Eula Yo RN

## 2017-12-19 NOTE — TELEPHONE ENCOUNTER
Spoke with patient she moved and she is seeing a different dr and will tell her pharmacy on the next refill request

## 2018-01-16 DIAGNOSIS — R09.82 POST-NASAL DRAINAGE: ICD-10-CM

## 2018-01-17 RX ORDER — FLUTICASONE PROPIONATE 50 MCG
SPRAY, SUSPENSION (ML) NASAL
Qty: 16 ML | Refills: 0 | OUTPATIENT
Start: 2018-01-17

## 2018-01-17 NOTE — TELEPHONE ENCOUNTER
"Requested Prescriptions   Pending Prescriptions Disp Refills     fluticasone (FLONASE) 50 MCG/ACT spray [Pharmacy Med Name: FLUTICASONE PROP 50 MCG SPRAY]  Last Written Prescription Date:  12/19/2017  Last Fill Quantity: 16 mL,  # refills: 0   Last Office Visit with [facility] or [facility] prescribing provider:  11/29/2016   Future Office Visit:      16 mL 0     Sig: INSTILL 2 SPRAYS INTO BOTH NOSTRILS ONCE DAILY    Inhaled Steroids Protocol Failed    1/16/2018  5:09 PM  No flowsheet data found.         Failed - Recent or future visit with authorizing provider's specialty    Patient had office visit in the last year or has a visit in the next 30 days with authorizing provider.  See \"Patient Info\" tab in inbasket, or \"Choose Columns\" in Meds & Orders section of the refill encounter.              Passed - Patient is age 12 or older          "

## 2018-01-17 NOTE — TELEPHONE ENCOUNTER
Per last refill, patient had moved and is seeing a different provider. Pharmacy notified.    Eula Yo RN

## 2018-01-23 DIAGNOSIS — I10 ESSENTIAL HYPERTENSION WITH GOAL BLOOD PRESSURE LESS THAN 140/90: ICD-10-CM

## 2018-01-23 NOTE — TELEPHONE ENCOUNTER
"Requested Prescriptions   Pending Prescriptions Disp Refills     bumetanide (BUMEX) 1 MG tablet [Pharmacy Med Name: BUMETANIDE 1 MG TABLET]    Last Written Prescription Date:  10/23/2017  Last Fill Quantity: 45,  # refills: 0   Last Office Visit with [facility], [facility] or [facility] prescribing provider:  11/29/2016   Future Office Visit:      45 tablet 0     Sig: TAKE 1/2 TABLET BY MOUTH DAILY    Diuretics (Including Combos) Protocol Failed    1/23/2018  9:31 AM       Failed - Blood pressure under 140/90    BP Readings from Last 3 Encounters:   11/16/17 164/68   02/25/17 118/72   11/29/16 138/68                Failed - Recent or future visit with authorizing provider's specialty    Patient had office visit in the last year or has a visit in the next 30 days with authorizing provider.  See \"Patient Info\" tab in inbasket, or \"Choose Columns\" in Meds & Orders section of the refill encounter.            Failed - Normal serum sodium on file in past 12 months    Recent Labs   Lab Test  11/16/17   0808   NA  132*             Passed - Patient is age 18 or older       Passed - No active pregancy on record       Passed - Normal serum creatinine on file in past 12 months    Recent Labs   Lab Test  11/16/17   0808   CR  0.88             Passed - Normal serum potassium on file in past 12 months    Recent Labs   Lab Test  11/16/17   0808   POTASSIUM  3.8                   Passed - No positive pregnancy test in past 12 months          "

## 2018-01-30 RX ORDER — BUMETANIDE 1 MG/1
TABLET ORAL
Qty: 15 TABLET | Refills: 0 | Status: ON HOLD | OUTPATIENT
Start: 2018-01-30 | End: 2018-08-21

## 2018-01-30 NOTE — TELEPHONE ENCOUNTER
Several refills, patient is due for an appointment.  Sent for one month, patient needs an appointment.  Please call her to schedule this.   Not seen since 11/16.    Pauline Coker, RN  Triage Nurse

## 2018-02-14 DIAGNOSIS — K22.2 STRICTURE AND STENOSIS OF ESOPHAGUS: ICD-10-CM

## 2018-02-14 NOTE — TELEPHONE ENCOUNTER
"Requested Prescriptions   Pending Prescriptions Disp Refills     omeprazole (PRILOSEC) 20 MG CR capsule [Pharmacy Med Name: OMEPRAZOLE CAPS 20MG]  PATIENT IS DUE FOR AN OFFICE VISIT.  Last Written Prescription Date:  11/15/17  Last Fill Quantity: 90 CAPSULE,  # refills: 0   Last office visit: 11/29/2016 with prescribing provider:  11/29/16   Future Office Visit:     90 capsule 0     Sig: TAKE 1 CAPSULE DAILY EVERY MORNING (DUE FOR OFFICE VISIT FOR ANY FURTHER REFILLS)    PPI Protocol Failed    2/14/2018 12:03 AM       Failed - No diagnosis of osteoporosis on record       Failed - Recent or future visit with authorizing provider's specialty    Patient had office visit in the last year or has a visit in the next 30 days with authorizing provider.  See \"Patient Info\" tab in inbasket, or \"Choose Columns\" in Meds & Orders section of the refill encounter.            Passed - Not on Clopidogrel (unless Pantoprazole ordered)       Passed - Patient is age 18 or older       Passed - No active pregnacy on record       Passed - No positive pregnancy test in past 12 months          "

## 2018-02-26 DIAGNOSIS — I10 ESSENTIAL HYPERTENSION WITH GOAL BLOOD PRESSURE LESS THAN 140/90: ICD-10-CM

## 2018-02-27 NOTE — TELEPHONE ENCOUNTER
"Requested Prescriptions   Pending Prescriptions Disp Refills     bumetanide (BUMEX) 1 MG tablet [Pharmacy Med Name: BUMETANIDE 1 MG TABLET]  Last Written Prescription Date:  1/30/18  Last Fill Quantity: 15,  # refills: 0   Last office visit: 11/29/2016 with prescribing provider:  11/29/2016     Future Office Visit:     15 tablet 0     Sig: TAKE 1/2 TABLET BY MOUTH DAILY    Diuretics (Including Combos) Protocol Failed    2/26/2018  5:06 PM       Failed - Blood pressure under 140/90 in past 12 months    BP Readings from Last 3 Encounters:   11/16/17 164/68   02/25/17 118/72   11/29/16 138/68                Failed - Recent or future visit with authorizing provider's specialty    Patient had office visit in the last year or has a visit in the next 30 days with authorizing provider.  See \"Patient Info\" tab in inbasket, or \"Choose Columns\" in Meds & Orders section of the refill encounter.            Failed - Normal serum sodium on file in past 12 months    Recent Labs   Lab Test  11/16/17   0808   NA  132*             Failed - No positive pregnancy test in past 12 months       Passed - Patient is age 18 or older       Passed - No active pregancy on record       Passed - Normal serum creatinine on file in past 12 months    Recent Labs   Lab Test  11/16/17   0808   CR  0.88             Passed - Normal serum potassium on file in past 12 months    Recent Labs   Lab Test  11/16/17   0808   POTASSIUM  3.8                      "

## 2018-02-28 RX ORDER — BUMETANIDE 1 MG/1
TABLET ORAL
Qty: 15 TABLET | Refills: 0 | OUTPATIENT
Start: 2018-02-28

## 2018-02-28 NOTE — TELEPHONE ENCOUNTER
See note under care teams and under last refill for bumex.      Called the pharmacy and spoke to Miguel.  Advised of above that it looks like she is no longer seeing Dr. Aj.   See care coordination note of 11/17/17.       Denied medication to the pharmacy.

## 2018-03-13 DIAGNOSIS — I10 ESSENTIAL HYPERTENSION WITH GOAL BLOOD PRESSURE LESS THAN 140/90: ICD-10-CM

## 2018-03-15 NOTE — TELEPHONE ENCOUNTER
Routing refill request to provider for review/approval because:  Lucero given x1 and patient did not follow up, please advise  out of range:  Blood pressure  Patient needs to be seen because it has been more than 1 year since last office visit.

## 2018-03-16 NOTE — TELEPHONE ENCOUNTER
Last seen 11/29/2016.  Overdue for appt. please contact pt/family to let her know that she is overdue and that we are unable to fill Rx.  Perhaps she has been seen elsewhere but Rx request has been sent to us and since it was filled, no new information provided.  Lab Results   Component Value Date    CR 0.88 11/16/2017

## 2018-03-19 RX ORDER — BUMETANIDE 1 MG/1
TABLET ORAL
Qty: 15 TABLET | Refills: 0 | OUTPATIENT
Start: 2018-03-19

## 2018-03-20 DIAGNOSIS — I10 ESSENTIAL HYPERTENSION WITH GOAL BLOOD PRESSURE LESS THAN 140/90: ICD-10-CM

## 2018-03-20 NOTE — TELEPHONE ENCOUNTER
"Requested Prescriptions   Pending Prescriptions Disp Refills     bumetanide (BUMEX) 1 MG tablet [Pharmacy Med Name: BUMETANIDE 1 MG TABLET]  Last Written Prescription Date:  1/30/18  Last Fill Quantity: 15,  # refills: 0   Last office visit: 11/29/2016 with prescribing provider:  11/29/2016     Future Office Visit:     15 tablet 0     Sig: TAKE 1/2 TABLET BY MOUTH DAILY    Diuretics (Including Combos) Protocol Failed    3/20/2018  9:38 AM       Failed - Blood pressure under 140/90 in past 12 months    BP Readings from Last 3 Encounters:   11/16/17 164/68   02/25/17 118/72   11/29/16 138/68                Failed - Recent (12 mo) or future (30 days) visit within the authorizing provider's specialty    Patient had office visit in the last 12 months or has a visit in the next 30 days with authorizing provider or within the authorizing provider's specialty.  See \"Patient Info\" tab in inbasket, or \"Choose Columns\" in Meds & Orders section of the refill encounter.           Failed - Normal serum sodium on file in past 12 months    Recent Labs   Lab Test  11/16/17   0808   NA  132*             Passed - Patient is age 18 or older       Passed - No active pregancy on record       Passed - Normal serum creatinine on file in past 12 months    Recent Labs   Lab Test  11/16/17   0808   CR  0.88             Passed - Normal serum potassium on file in past 12 months    Recent Labs   Lab Test  11/16/17   0808   POTASSIUM  3.8                   Passed - No positive pregnancy test in past 12 months          "

## 2018-03-21 RX ORDER — BUMETANIDE 1 MG/1
TABLET ORAL
Qty: 15 TABLET | Refills: 0 | OUTPATIENT
Start: 2018-03-21

## 2018-03-21 NOTE — TELEPHONE ENCOUNTER
Spoke with [facility] pharmacist. Patient seeing a new provider.     Carlee FONTANA, RN, BSN, PHN  [place] Flex RN

## 2018-07-25 ENCOUNTER — RECORDS - HEALTHEAST (OUTPATIENT)
Dept: ADMINISTRATIVE | Facility: OTHER | Age: 83
End: 2018-07-25

## 2018-08-08 ENCOUNTER — HOSPITAL ENCOUNTER (OUTPATIENT)
Dept: RADIOLOGY | Facility: HOSPITAL | Age: 83
Discharge: HOME | End: 2018-08-08
Attending: OTOLARYNGOLOGY

## 2018-08-08 DIAGNOSIS — H61.21 IMPACTED CERUMEN OF RIGHT EAR: ICD-10-CM

## 2018-08-08 DIAGNOSIS — R13.10 DIFFICULTY IN SWALLOWING: ICD-10-CM

## 2018-08-08 DIAGNOSIS — R09.A2 FOREIGN BODY SENSATION IN THROAT: ICD-10-CM

## 2018-08-17 ENCOUNTER — HOSPITAL ENCOUNTER (INPATIENT)
Facility: CLINIC | Age: 83
LOS: 3 days | Discharge: NURSING, REHAB OR LONG-TERM CARE | DRG: 074 | End: 2018-08-21
Attending: EMERGENCY MEDICINE | Admitting: INTERNAL MEDICINE
Payer: MEDICARE

## 2018-08-17 ENCOUNTER — APPOINTMENT (OUTPATIENT)
Dept: CT IMAGING | Facility: CLINIC | Age: 83
DRG: 074 | End: 2018-08-17
Attending: EMERGENCY MEDICINE
Payer: MEDICARE

## 2018-08-17 DIAGNOSIS — R05.9 COUGH: ICD-10-CM

## 2018-08-17 DIAGNOSIS — R53.81 PHYSICAL DECONDITIONING: ICD-10-CM

## 2018-08-17 DIAGNOSIS — M62.81 GENERALIZED MUSCLE WEAKNESS: ICD-10-CM

## 2018-08-17 PROBLEM — E87.1 HYPONATREMIA: Status: ACTIVE | Noted: 2018-08-17

## 2018-08-17 LAB
ALBUMIN UR-MCNC: NEGATIVE MG/DL
ANION GAP SERPL CALCULATED.3IONS-SCNC: 8 MMOL/L (ref 3–14)
APPEARANCE UR: CLEAR
BACTERIA #/AREA URNS HPF: ABNORMAL /HPF
BASOPHILS # BLD AUTO: 0 10E9/L (ref 0–0.2)
BASOPHILS NFR BLD AUTO: 0.5 %
BILIRUB UR QL STRIP: NEGATIVE
BUN SERPL-MCNC: 25 MG/DL (ref 7–30)
CALCIUM SERPL-MCNC: 9.1 MG/DL (ref 8.5–10.1)
CHLORIDE SERPL-SCNC: 89 MMOL/L (ref 94–109)
CO2 SERPL-SCNC: 27 MMOL/L (ref 20–32)
COLOR UR AUTO: ABNORMAL
CREAT SERPL-MCNC: 1.16 MG/DL (ref 0.52–1.04)
CREAT UR-MCNC: 17 MG/DL
DIFFERENTIAL METHOD BLD: NORMAL
EOSINOPHIL # BLD AUTO: 0.4 10E9/L (ref 0–0.7)
EOSINOPHIL NFR BLD AUTO: 4.7 %
ERYTHROCYTE [DISTWIDTH] IN BLOOD BY AUTOMATED COUNT: 13.8 % (ref 10–15)
FRACT EXCRET NA UR+SERPL-RTO: 1.7 %
GFR SERPL CREATININE-BSD FRML MDRD: 44 ML/MIN/1.7M2
GLUCOSE SERPL-MCNC: 117 MG/DL (ref 70–99)
GLUCOSE UR STRIP-MCNC: NEGATIVE MG/DL
HCT VFR BLD AUTO: 35.6 % (ref 35–47)
HGB BLD-MCNC: 12.4 G/DL (ref 11.7–15.7)
HGB UR QL STRIP: NEGATIVE
IMM GRANULOCYTES # BLD: 0.1 10E9/L (ref 0–0.4)
IMM GRANULOCYTES NFR BLD: 0.8 %
INTERPRETATION ECG - MUSE: NORMAL
KETONES UR STRIP-MCNC: NEGATIVE MG/DL
LEUKOCYTE ESTERASE UR QL STRIP: NEGATIVE
LYMPHOCYTES # BLD AUTO: 1.8 10E9/L (ref 0.8–5.3)
LYMPHOCYTES NFR BLD AUTO: 20.3 %
MCH RBC QN AUTO: 30.5 PG (ref 26.5–33)
MCHC RBC AUTO-ENTMCNC: 34.8 G/DL (ref 31.5–36.5)
MCV RBC AUTO: 88 FL (ref 78–100)
MONOCYTES # BLD AUTO: 1.2 10E9/L (ref 0–1.3)
MONOCYTES NFR BLD AUTO: 13.3 %
NEUTROPHILS # BLD AUTO: 5.3 10E9/L (ref 1.6–8.3)
NEUTROPHILS NFR BLD AUTO: 60.4 %
NITRATE UR QL: NEGATIVE
NRBC # BLD AUTO: 0 10*3/UL
NRBC BLD AUTO-RTO: 0 /100
NT-PROBNP SERPL-MCNC: 357 PG/ML (ref 0–1800)
PH UR STRIP: 6.5 PH (ref 5–7)
PLATELET # BLD AUTO: 222 10E9/L (ref 150–450)
POTASSIUM SERPL-SCNC: 4.1 MMOL/L (ref 3.4–5.3)
RBC # BLD AUTO: 4.06 10E12/L (ref 3.8–5.2)
RBC #/AREA URNS AUTO: 0 /HPF (ref 0–2)
SODIUM SERPL-SCNC: 124 MMOL/L (ref 133–144)
SODIUM SERPL-SCNC: 126 MMOL/L (ref 133–144)
SODIUM UR-SCNC: 31 MMOL/L
SOURCE: ABNORMAL
SP GR UR STRIP: 1.01 (ref 1–1.03)
SQUAMOUS #/AREA URNS AUTO: 1 /HPF (ref 0–1)
T4 FREE SERPL-MCNC: 1.35 NG/DL (ref 0.76–1.46)
TROPONIN I SERPL-MCNC: <0.015 UG/L (ref 0–0.04)
TSH SERPL DL<=0.005 MIU/L-ACNC: 6.57 MU/L (ref 0.4–4)
UROBILINOGEN UR STRIP-MCNC: NORMAL MG/DL (ref 0–2)
WBC # BLD AUTO: 8.7 10E9/L (ref 4–11)
WBC #/AREA URNS AUTO: <1 /HPF (ref 0–5)

## 2018-08-17 PROCEDURE — 96361 HYDRATE IV INFUSION ADD-ON: CPT

## 2018-08-17 PROCEDURE — 84439 ASSAY OF FREE THYROXINE: CPT | Performed by: EMERGENCY MEDICINE

## 2018-08-17 PROCEDURE — 83880 ASSAY OF NATRIURETIC PEPTIDE: CPT | Performed by: EMERGENCY MEDICINE

## 2018-08-17 PROCEDURE — 85025 COMPLETE CBC W/AUTO DIFF WBC: CPT | Performed by: EMERGENCY MEDICINE

## 2018-08-17 PROCEDURE — 99220 ZZC INITIAL OBSERVATION CARE,LEVL III: CPT | Performed by: INTERNAL MEDICINE

## 2018-08-17 PROCEDURE — 25000125 ZZHC RX 250: Performed by: EMERGENCY MEDICINE

## 2018-08-17 PROCEDURE — 82570 ASSAY OF URINE CREATININE: CPT | Performed by: EMERGENCY MEDICINE

## 2018-08-17 PROCEDURE — 25000128 H RX IP 250 OP 636: Performed by: EMERGENCY MEDICINE

## 2018-08-17 PROCEDURE — 84443 ASSAY THYROID STIM HORMONE: CPT | Performed by: EMERGENCY MEDICINE

## 2018-08-17 PROCEDURE — G0378 HOSPITAL OBSERVATION PER HR: HCPCS

## 2018-08-17 PROCEDURE — 93005 ELECTROCARDIOGRAM TRACING: CPT

## 2018-08-17 PROCEDURE — 81001 URINALYSIS AUTO W/SCOPE: CPT | Performed by: EMERGENCY MEDICINE

## 2018-08-17 PROCEDURE — 80048 BASIC METABOLIC PNL TOTAL CA: CPT | Performed by: EMERGENCY MEDICINE

## 2018-08-17 PROCEDURE — 84484 ASSAY OF TROPONIN QUANT: CPT | Performed by: EMERGENCY MEDICINE

## 2018-08-17 PROCEDURE — 99285 EMERGENCY DEPT VISIT HI MDM: CPT | Mod: 25

## 2018-08-17 PROCEDURE — 84300 ASSAY OF URINE SODIUM: CPT | Performed by: EMERGENCY MEDICINE

## 2018-08-17 PROCEDURE — 96360 HYDRATION IV INFUSION INIT: CPT | Mod: 59

## 2018-08-17 PROCEDURE — 71260 CT THORAX DX C+: CPT

## 2018-08-17 PROCEDURE — 84295 ASSAY OF SERUM SODIUM: CPT | Performed by: EMERGENCY MEDICINE

## 2018-08-17 RX ORDER — MULTIPLE VITAMINS W/ MINERALS TAB 9MG-400MCG
1 TAB ORAL DAILY
Status: ON HOLD | COMMUNITY
End: 2024-01-01

## 2018-08-17 RX ORDER — ONDANSETRON 4 MG/1
4 TABLET, ORALLY DISINTEGRATING ORAL EVERY 6 HOURS PRN
Status: DISCONTINUED | OUTPATIENT
Start: 2018-08-17 | End: 2018-08-21 | Stop reason: HOSPADM

## 2018-08-17 RX ORDER — LATANOPROST 50 UG/ML
1 SOLUTION/ DROPS OPHTHALMIC AT BEDTIME
Status: DISCONTINUED | OUTPATIENT
Start: 2018-08-17 | End: 2018-08-18

## 2018-08-17 RX ORDER — ACETAMINOPHEN 325 MG/1
650 TABLET ORAL EVERY 4 HOURS PRN
Status: DISCONTINUED | OUTPATIENT
Start: 2018-08-17 | End: 2018-08-21 | Stop reason: HOSPADM

## 2018-08-17 RX ORDER — FEXOFENADINE HCL 180 MG/1
180 TABLET ORAL DAILY PRN
COMMUNITY
End: 2024-01-01

## 2018-08-17 RX ORDER — AMOXICILLIN 250 MG
1 CAPSULE ORAL 2 TIMES DAILY PRN
Status: DISCONTINUED | OUTPATIENT
Start: 2018-08-17 | End: 2018-08-21 | Stop reason: HOSPADM

## 2018-08-17 RX ORDER — IPRATROPIUM BROMIDE AND ALBUTEROL SULFATE 2.5; .5 MG/3ML; MG/3ML
3 SOLUTION RESPIRATORY (INHALATION) ONCE
Status: DISCONTINUED | OUTPATIENT
Start: 2018-08-17 | End: 2018-08-18

## 2018-08-17 RX ORDER — FEXOFENADINE HCL 180 MG/1
180 TABLET ORAL DAILY PRN
Status: DISCONTINUED | OUTPATIENT
Start: 2018-08-17 | End: 2018-08-21 | Stop reason: HOSPADM

## 2018-08-17 RX ORDER — FLUTICASONE PROPIONATE 50 MCG
2 SPRAY, SUSPENSION (ML) NASAL DAILY
Status: DISCONTINUED | OUTPATIENT
Start: 2018-08-18 | End: 2018-08-21 | Stop reason: HOSPADM

## 2018-08-17 RX ORDER — PROCHLORPERAZINE MALEATE 5 MG
5 TABLET ORAL EVERY 6 HOURS PRN
Status: DISCONTINUED | OUTPATIENT
Start: 2018-08-17 | End: 2018-08-21 | Stop reason: HOSPADM

## 2018-08-17 RX ORDER — ACETAMINOPHEN 650 MG/1
650 SUPPOSITORY RECTAL EVERY 4 HOURS PRN
Status: DISCONTINUED | OUTPATIENT
Start: 2018-08-17 | End: 2018-08-21 | Stop reason: HOSPADM

## 2018-08-17 RX ORDER — ATENOLOL 50 MG/1
50 TABLET ORAL EVERY MORNING
Status: CANCELLED | OUTPATIENT
Start: 2018-08-18

## 2018-08-17 RX ORDER — BISACODYL 10 MG
10 SUPPOSITORY, RECTAL RECTAL DAILY PRN
Status: DISCONTINUED | OUTPATIENT
Start: 2018-08-17 | End: 2018-08-21 | Stop reason: HOSPADM

## 2018-08-17 RX ORDER — GABAPENTIN 100 MG/1
100 CAPSULE ORAL DAILY
Status: DISCONTINUED | OUTPATIENT
Start: 2018-08-18 | End: 2018-08-21 | Stop reason: HOSPADM

## 2018-08-17 RX ORDER — NALOXONE HYDROCHLORIDE 0.4 MG/ML
.1-.4 INJECTION, SOLUTION INTRAMUSCULAR; INTRAVENOUS; SUBCUTANEOUS
Status: DISCONTINUED | OUTPATIENT
Start: 2018-08-17 | End: 2018-08-21 | Stop reason: HOSPADM

## 2018-08-17 RX ORDER — IOPAMIDOL 755 MG/ML
73 INJECTION, SOLUTION INTRAVASCULAR ONCE
Status: COMPLETED | OUTPATIENT
Start: 2018-08-17 | End: 2018-08-17

## 2018-08-17 RX ORDER — LISINOPRIL 20 MG/1
20 TABLET ORAL 2 TIMES DAILY
Status: DISCONTINUED | OUTPATIENT
Start: 2018-08-17 | End: 2018-08-21 | Stop reason: HOSPADM

## 2018-08-17 RX ORDER — ONDANSETRON 2 MG/ML
4 INJECTION INTRAMUSCULAR; INTRAVENOUS EVERY 6 HOURS PRN
Status: DISCONTINUED | OUTPATIENT
Start: 2018-08-17 | End: 2018-08-21 | Stop reason: HOSPADM

## 2018-08-17 RX ORDER — LIDOCAINE 40 MG/G
CREAM TOPICAL
Status: DISCONTINUED | OUTPATIENT
Start: 2018-08-17 | End: 2018-08-21 | Stop reason: HOSPADM

## 2018-08-17 RX ORDER — ASPIRIN 81 MG/1
81 TABLET ORAL EVERY EVENING
Status: DISCONTINUED | OUTPATIENT
Start: 2018-08-17 | End: 2018-08-21 | Stop reason: HOSPADM

## 2018-08-17 RX ORDER — ESTRADIOL 10 UG/1
10 INSERT VAGINAL
Status: DISCONTINUED | OUTPATIENT
Start: 2018-08-17 | End: 2018-08-21 | Stop reason: HOSPADM

## 2018-08-17 RX ORDER — AMOXICILLIN 250 MG
2 CAPSULE ORAL 2 TIMES DAILY PRN
Status: DISCONTINUED | OUTPATIENT
Start: 2018-08-17 | End: 2018-08-21 | Stop reason: HOSPADM

## 2018-08-17 RX ORDER — POLYETHYLENE GLYCOL 3350 17 G/17G
17 POWDER, FOR SOLUTION ORAL DAILY PRN
Status: DISCONTINUED | OUTPATIENT
Start: 2018-08-17 | End: 2018-08-21 | Stop reason: HOSPADM

## 2018-08-17 RX ORDER — TIMOLOL MALEATE 5 MG/ML
1 SOLUTION/ DROPS OPHTHALMIC DAILY
Status: DISCONTINUED | OUTPATIENT
Start: 2018-08-18 | End: 2018-08-21 | Stop reason: HOSPADM

## 2018-08-17 RX ORDER — PROCHLORPERAZINE 25 MG
12.5 SUPPOSITORY, RECTAL RECTAL EVERY 12 HOURS PRN
Status: DISCONTINUED | OUTPATIENT
Start: 2018-08-17 | End: 2018-08-21 | Stop reason: HOSPADM

## 2018-08-17 RX ADMIN — SODIUM CHLORIDE, PRESERVATIVE FREE 62 ML: 5 INJECTION INTRAVENOUS at 19:44

## 2018-08-17 RX ADMIN — SODIUM CHLORIDE 500 ML: 9 INJECTION, SOLUTION INTRAVENOUS at 21:19

## 2018-08-17 RX ADMIN — IOPAMIDOL 73 ML: 755 INJECTION, SOLUTION INTRAVENOUS at 19:44

## 2018-08-17 ASSESSMENT — ENCOUNTER SYMPTOMS
ABDOMINAL PAIN: 0
COUGH: 1
SHORTNESS OF BREATH: 0
FATIGUE: 1
WEAKNESS: 1
NAUSEA: 0
VOMITING: 0

## 2018-08-17 NOTE — IP AVS SNAPSHOT
` ` Patient Information     Patient Name Sex     Delmis Quarles ([id number]) Female 1924       Room Bed    6605 6605-87      Patient Demographics     Address Phone    [address]   [place] [address] [phone] (Home)  none (Work)  none (Mobile)      Patient Ethnicity & Race     Ethnic Group Patient Race    American White      Emergency Contact(s)     Name Relation Home Work Mobile    Denise Becerril Daughter [phone]    Augustine Abreu Daughter [phone] none [phone]     Vincent Quarles Son   [phone]      Documents on File        Status Date Received Description       Documents for the Patient    Insurance Card  ()      Face Sheet Received () 10/13/08     Insurance Card  ()  medicare    External Medication Information Consent Accepted () 09     Insurance Card Received () 09     Face Sheet Received () 09     [facility] Face Sheet Received but Refused Research () 05/20/10     Insurance Card  () 05/20/10     Other  05/20/10 Medicare questions    Privacy Notice - [place] Received 11/18/10     External Medication Information Consent Accepted () 05/28/10     Patient ID Received () 12     Consent for Services - Hospital/Clinic Received () 09/29/10     Consent for Services - Hospital/Clinic Received () 05/20/10     External Medication Information Consent Accepted () 06/10/11     Consent for Services - Hospital/Clinic Received () 06/10/11     Insurance Card Received () 12     Insurance Card Received () 12 Medicare    CMS IM for Patient Signature       Consent for Services - Hospital/Clinic Received () 12     External Medication Information Consent Accepted () 12     HIM MO Authorization  12 Medicare    Consent for EHR Access  13 Copied from existing Consent for services - C/HOD collected on  2012    [facility] Specified Other       Insurance Card Received () 13 [place]    Waiver - Payment Received () 13     Consent for Services - Hospital/Clinic Received () 13     External Medication Information Consent Accepted 13     Insurance Card Received 14 medicare    Insurance Card Received () 14 commercial [place]    Consent to Communicate Received 14 auth to discuss    Patient ID Received 14 [place]    Advance Directives and Living Will Received  POLST 2014    Consent for Services - Hospital/Clinic Received () 14 Consent For Services    Consent to Communicate Received 01/02/15 email decline    HIM MO Authorization  05/07/15 \[facility] [place] [facility]\"" [facility]    Advance Directives and Living Will Received 06/02/15 Health Care Directive 02    Advance Directives and Living Will Not Received  Validation of AD 02    Consent for Services - Hospital/Clinic Received () 10/28/15     Insurance Card Received 10/28/15 [place]    Consent for Services/Privacy Notice - Hospital/Clinic Received () 17     Consent for Services/Privacy Notice - Hospital/Clinic Received () 16     Consent for Services/Privacy Notice - Hospital/Clinic       Insurance Card Received 17 COMMERCIAL    HIM MO Authorization  17 [facility] Everywhere Prospective Auth Received 17     Consent for Services - Hospital and Clinic Received 18     HIE Auth Received 18     Patient ID Received 18     Insurance Card Received 18     Insurance Card Received 18     Privacy Notice - [place]  (Deleted) 03     External Medication Information Consent  (Deleted) 09 pt signed medication authorization    Advance Directives and Living Will Not Received (Deleted)  POLST 2014       Documents for the Encounter    CMS IM for Patient Signature Received 18      CE Point of Care Auth Received        Admission Information     Attending Provider Admitting Provider Admission Type Admission Date/Time    Donte Palma, Donte Bell,  Emergency 08/17/18  1807    Discharge Date Hospital Service Auth/Cert Status Service Area     Hospitalist Incomplete [facility] SERVICES    Unit Room/Bed Admission Status        [facility] SPEC UNIT 6605/6605-02 Admission (Confirmed)       Admission     Complaint    Hyponatremia, Generalized weakness      Hospital Account     Name Acct ID Class Status Primary Coverage    Delmis Quarles [id number] Inpatient Open MEDICARE - MEDICARE            Guarantor Account (for Hospital Account #[id number])     Name Relation to Pt Service Area Active? Acct Type    Delmis Quarles  FCS Yes Personal/Family    Address Phone          [address] [place] [address] [phone](H)  none(O)              Coverage Information (for Hospital Account #[id number])     1. MEDICARE/MEDICARE     F/O Payor/Plan Precert #    MEDICARE/MEDICARE     Subscriber Subscriber #    Delmis Quarles [id number]    Address Phone    ATTN CLAIMS  [address]  [facility] [place] [address] [phone]          2. COMMERCIAL/OTHER     F/O Payor/Plan Precert #    COMMERCIAL/OTHER     Subscriber Subscriber #    Delmis Quarles [id number]    Address Phone    [address] [address]  [place] [address] [phone]

## 2018-08-17 NOTE — IP AVS SNAPSHOT
` `     [place] [address] [facility]: [phone]            Medication Administration Report for Delmis Quarles as of 08/21/18 1400   Legend:    Given Hold Not Given Due Canceled Entry Other Actions    Time Time (Time) Time  Time-Action       Inactive    Active    Linked        Medications 08/15/18 08/16/18 08/17/18 08/18/18 08/19/18 08/20/18 08/21/18    acetaminophen (TYLENOL) Suppository 650 mg  Dose: 650 mg  Freq: EVERY 4 HOURS PRN Route: RE  PRN Reason: mild pain  Start: 08/17/18 2349   Admin Instructions: Alternate ibuprofen (if ordered) with acetaminophen.  Maximum acetaminophen dose from all sources = 75 mg/kg/day not to exceed 4 grams/day.    Admin. Amount: 1 suppository (1 × 650 mg suppository)  Dispense Loc:  [facility] 66[facility]               acetaminophen (TYLENOL) tablet 650 mg  Dose: 650 mg  Freq: EVERY 4 HOURS PRN Route: PO  PRN Reason: mild pain  Start: 08/17/18 2349   Admin Instructions: Alternate ibuprofen (if ordered) with acetaminophen.  Maximum acetaminophen dose from all sources = 75 mg/kg/day not to exceed 4 grams/day.    Admin. Amount: 2 tablet (2 × 325 mg tablet)  Dispense Loc:  [facility]               albuterol neb solution 2.5 mg  Dose: 2.5 mg  Freq: EVERY 2 HOURS PRN Route: NEBULIZATION  PRN Reason: other  PRN Comment: dyspnea  Start: 08/18/18 1135   Admin. Amount: 2.5 mg = 3 mL Conc: 2.5 mg/3 mL  Last Admin: 08/18/18 1252  Dispense Loc:  [facility]  Volume: 3 mL        1252 (2.5 mg)-Given              aspirin EC tablet 81 mg  Dose: 81 mg  Freq: EVERY EVENING Route: PO  Start: 08/17/18 2350   Admin Instructions: DO NOT CRUSH.    Admin. Amount: 1 tablet (1 × 81 mg tablet)  Last Admin: 08/20/18 2110  Dispense Loc:  [facility]        0034 (81 mg)-Given       1907 (81 mg)-Given        2006 (81 mg)-Given        2110 (81 mg)-Given        [ ] 2000           bisacodyl (DULCOLAX) Suppository 10 mg  Dose: 10 mg  Freq: DAILY PRN Route: RE  PRN Reason: constipation  Start: 08/17/18 5089    Admin Instructions: Hold for loose stools.  This is the third step of a three step constipation treatment.    Admin. Amount: 1 suppository (1 × 10 mg suppository)  Dispense Loc: [facility] 66B               estradiol (VAGIFEM) vaginal tablet 10 mcg  Dose: 10 mcg  Freq: Once per day on Tue Fri Route: VA  Start: 08/17/18 2350   Admin Instructions: Okay to use patient's home applicator for 8/17 dose (has with, individually packaged)    Admin. Amount: 1 tablet (1 × 10 mcg tablet)  Last Admin: 08/18/18 0014  Dispense Loc:  Main Pharmacy        0014 (10 mcg)-Given          [ ] 2000           fexofenadine (ALLEGRA) tablet 180 mg  Dose: 180 mg  Freq: DAILY PRN Route: PO  PRN Reason: allergies  Start: 08/17/18 2349   Admin. Amount: 1 tablet (1 × 180 mg tablet)  Dispense Loc: [facility] 66B               fluticasone (FLONASE) 50 MCG/ACT spray 2 spray  Dose: 2 spray  Freq: DAILY Route: BOTH NOSTRIL  Start: 08/18/18 0800   Admin. Amount: 2 spray  Last Admin: 08/21/18 0843  Dispense Loc:  Main Pharmacy        0806 (2 spray)-Given        0808 (2 spray)-Given        0753 (2 spray)-Given        0843 (2 spray)-Given           gabapentin (NEURONTIN) capsule 100 mg  Dose: 100 mg  Freq: DAILY Route: PO  Start: 08/18/18 1200   Admin Instructions: 200 mg in the morning,  100 mg at noon,  200 mg in the evening    Admin. Amount: 1 capsule (1 × 100 mg capsule)  Last Admin: 08/21/18 1122  Dispense Loc: [place] [address]B        1313 (100 mg)-Given        1301 (100 mg)-Given        1143 (100 mg)-Given        1122 (100 mg)-Given           gabapentin (NEURONTIN) capsule 200 mg  Dose: 200 mg  Freq: 2 TIMES DAILY Route: PO  Start: 08/18/18 0009   Admin Instructions: 200 mg in the morning,  100 mg at noon,  200 mg in the evening    Admin. Amount: 2 capsule (2 × 100 mg capsule)  Last Admin: 08/21/18 0843  Dispense Loc: [place] [address]B        0034 (200 mg)-Given       0803 (200 mg)-Given       1905 (200 mg)-Given        0806 (200 mg)-Given       2006 (200  "mg)-Given        0754 (200 mg)-Given       2110 (200 mg)-Given        0843 (200 mg)-Given       [ ] 2000           guaiFENesin (MUCINEX) 12 hr tablet 600 mg  Dose: 600 mg  Freq: 2 TIMES DAILY Route: PO  Start: 08/21/18 1100   Admin Instructions: DO NOT CRUSH.    Admin. Amount: 1 tablet (1 × 600 mg tablet)  Last Admin: 08/21/18 1121  Dispense Loc: [place] [address]B           1121 (600 mg)-Given       [ ] 2100           guaiFENesin (ROBITUSSIN) 20 mg/mL solution 10 mL  Dose: 10 mL  Freq: EVERY 4 HOURS PRN Route: PO  PRN Reason: cough  Start: 08/18/18 0113   Admin. Amount: 10 mL  Last Admin: 08/20/18 0019  Dispense Loc: [place] [address]B  Volume: 10 mL        0532 (10 mL)-Given        0351 (10 mL)-Given       2006 (10 mL)-Given        0019 (10 mL)-Given            guaiFENesin-dextromethorphan (ROBITUSSIN DM) 100-10 MG/5ML syrup 5 mL  Dose: 5 mL  Freq: EVERY 4 HOURS PRN Route: PO  PRN Reason: cough  Start: 08/19/18 1000   Admin. Amount: 5 mL  Last Admin: 08/19/18 1303  Dispense Loc: [place] [address]B  Volume: 10 mL         1303 (5 mL)-Given             latanoprost (XALATAN) 0.005 % ophthalmic solution 1 drop  Dose: 1 drop  Freq: AT BEDTIME Route: Both Eyes  Start: 08/18/18 2200   Admin Instructions: Refrigerated product.    Admin. Amount: 1 drop  Last Admin: 08/20/18 2109  Dispense Loc:  Main Pharmacy  Volume: 2.5 mL        2314 (1 drop)-Given        2211 (1 drop)-Given        2109 (1 drop)-Given        [ ] 2200           lidocaine (LMX4) cream  Freq: EVERY 1 HOUR PRN Route: Top  PRN Reason: pain  PRN Comment: with VAD insertion or accessing implanted port.  Start: 08/17/18 2349   Admin Instructions: Do NOT give if patient has a history of allergy to any local anesthetic or any \"paty\" product.  Apply 30 minutes prior to VAD insertion or port access. MAX Dose: 2.5 g (  of 5 g tube).    Dispense Loc: Contact Rx for dose               lidocaine 1 % 1 mL  Dose: 1 mL  Freq: EVERY 1 HOUR PRN Route: OTHER  PRN Comment: mild pain with VAD " "insertion or accessing implanted port  Start: 08/17/18 2349   Admin Instructions: Do NOT give if patient has a history of allergy to any local anesthetic or any \"paty\" product. MAX dose 1 mL subcutaneous OR intradermal in divided doses.    Admin. Amount: 1 mL  Dispense Loc: [facility] STOCK  Volume: 2 mL               lisinopril (PRINIVIL/Zestril) tablet 20 mg  Dose: 20 mg  Freq: 2 TIMES DAILY Route: PO  Start: 08/17/18 2350   Admin Instructions: Formulary alternate for Benazepril (LOTENSIN)    Admin. Amount: 1 tablet (1 × 20 mg tablet)  Last Admin: 08/21/18 0843  Dispense Loc: [facility] 66B        0034 (20 mg)-Given       0802 (20 mg)-Given       1905 (20 mg)-Given        0806 (20 mg)-Given       2006 (20 mg)-Given        0754 (20 mg)-Given       2110 (20 mg)-Given        0843 (20 mg)-Given       [ ] 2000           melatonin tablet 1 mg  Dose: 1 mg  Freq: AT BEDTIME PRN Route: PO  PRN Reason: sleep  Start: 08/17/18 2349   Admin Instructions: Do not give unless at least 6 hours of uninterrupted sleep is expected.    Admin. Amount: 1 tablet (1 × 1 mg tablet)  Last Admin: 08/18/18 0034  Dispense Loc: [facility] 66B        0034 (1 mg)-Given              naloxone (NARCAN) injection 0.1-0.4 mg  Dose: 0.1-0.4 mg  Freq: EVERY 2 MIN PRN Route: IV  PRN Reason: opioid reversal  Start: 08/17/18 2349   Admin Instructions: For respiratory rate LESS than or EQUAL to 8.  Partial reversal dose:  0.1 mg titrated q 2 minutes for Analgesia Side Effects Monitoring Sedation Level of 3 (frequently drowsy, arousable, drifts to sleep during conversation).Full reversal dose:  0.4 mg bolus for Analgesia Side Effects Monitoring Sedation Level of 4 (somnolent, minimal or no response to stimulation).  For ordered doses up to 2mg give IVP. Give each 0.4mg over 15 seconds in emergency situations. For non-emergent situations further dilute in 9mL of NS to facilitate titration of response.    Admin. Amount: 0.1-0.4 mg = 0.25-1 mL Conc: 0.4 " mg/mL  Dispense Loc:  [facility] 66B  Volume: 1 mL               omeprazole (priLOSEC) CR capsule 20 mg  Dose: 20 mg  Freq: EVERY MORNING BEFORE BREAKFAST Route: PO  Start: 08/18/18 0730   Admin. Amount: 1 capsule (1 × 20 mg capsule)  Last Admin: 08/21/18 0643  Dispense Loc:  [facility] 66B        0803 (20 mg)-Given        0658 (20 mg)-Given        0630 (20 mg)-Given        0643 (20 mg)-Given           ondansetron (ZOFRAN-ODT) ODT tab 4 mg  Dose: 4 mg  Freq: EVERY 6 HOURS PRN Route: PO  PRN Reasons: nausea,vomiting  Start: 08/17/18 2349   Admin Instructions: This is Step 1 of nausea and vomiting management.  If nausea not resolved in 15 minutes, go to Step 2 prochlorperazine (COMPAZINE). Do not push through foil backing. Peel back foil and gently remove. Place on tongue immediately. Administration with liquid unnecessary  With dry hands, peel back foil backing and gently remove tablet; do not push oral disintegrating tablet through foil backing; administer immediately on tongue and oral disintegrating tablet dissolves in seconds; then swallow with saliva; liquid not required.    Admin. Amount: 1 tablet (1 × 4 mg tablet)  Dispense Loc:  [facility] 66B              Or  ondansetron (ZOFRAN) injection 4 mg  Dose: 4 mg  Freq: EVERY 6 HOURS PRN Route: IV  PRN Reasons: nausea,vomiting  Start: 08/17/18 2349   Admin Instructions: This is Step 1 of nausea and vomiting management.  If nausea not resolved in 15 minutes, go to Step 2 prochlorperazine (COMPAZINE).  Irritant. For ordered doses up to 4 mg, give IV Push undiluted over 2-5 minutes.    Admin. Amount: 4 mg = 2 mL Conc: 4 mg/2 mL  Dispense Loc:  [facility] 66[facility]  Infused Over: 2-5 Minutes  Volume: 2 mL               polyethylene glycol (MIRALAX/GLYCOLAX) Packet 17 g  Dose: 17 g  Freq: DAILY PRN Route: PO  PRN Reason: constipation  Start: 08/17/18 2349   Admin Instructions: Give in 8oz of  water, juice, or soda. Hold for loose stools.  This is the second step of a three step constipation  treatment.  1 Packet = 17 grams. Mixed prescribed dose in 8 ounces of water. Follow with 8 oz. of water.    Admin. Amount: 17 g  Dispense Loc: [facility] 66B               prochlorperazine (COMPAZINE) injection 5 mg  Dose: 5 mg  Freq: EVERY 6 HOURS PRN Route: IV  PRN Reasons: nausea,vomiting  Start: 08/17/18 2349   Admin Instructions: This is Step 2 of nausea and vomiting management. Give if nausea not resolved 15 minutes after giving ondansetron (ZOFRAN). If nausea not resolved in 15 minutes, go to Step 3 metoclopramide (REGLAN), if ordered.  For ordered doses up to 10 mg, give IV Push undiluted. Each 5mg over 1 minute.    Admin. Amount: 5 mg = 1 mL Conc: 5 mg/mL  Dispense Loc:  [facility]  Infused Over: 1-2 Minutes  Volume: 1 mL              Or  prochlorperazine (COMPAZINE) tablet 5 mg  Dose: 5 mg  Freq: EVERY 6 HOURS PRN Route: PO  PRN Reason: vomiting  Start: 08/17/18 2349   Admin Instructions: This is Step 2 of nausea and vomiting management. Give if nausea not resolved 15 minutes after giving ondansetron (ZOFRAN). If nausea not resolved in 15 minutes, go to Step 3 metoclopramide (REGLAN), if ordered.    Admin. Amount: 1 tablet (1 × 5 mg tablet)  Dispense Loc:  [facility] 66B              Or  prochlorperazine (COMPAZINE) Suppository 12.5 mg  Dose: 12.5 mg  Freq: EVERY 12 HOURS PRN Route: RE  PRN Reasons: nausea,vomiting  Start: 08/17/18 2349   Admin Instructions: This is Step 2 of nausea and vomiting management. Give if nausea not resolved 15 minutes after giving ondansetron (ZOFRAN). If nausea not resolved in 15 minutes, go to Step 3 metoclopramide (REGLAN), if ordered.    Admin. Amount: 0.5 suppository (0.5 × 25 mg suppository)  Dispense Loc:  Main Pharmacy               senna-docusate (SENOKOT-S;PERICOLACE) 8.6-50 MG per tablet 1 tablet  Dose: 1 tablet  Freq: 2 TIMES DAILY PRN Route: PO  PRN Reason: constipation  Start: 08/17/18 2349   Admin Instructions: If no bowel movement in 24 hours, increase to 2 tablets PO.   Hold for loose stools.  This is the first step of a three step constipation treatment.    Admin. Amount: 1 tablet  Dispense Loc:  [facility] 66B                     Or  senna-docusate (SENOKOT-S;PERICOLACE) 8.6-50 MG per tablet 2 tablet  Dose: 2 tablet  Freq: 2 TIMES DAILY PRN Route: PO  PRN Reason: constipation  Start: 08/17/18 2349   Admin Instructions: Hold for loose stools.  This is the first step of a three step constipation treatment.    Admin. Amount: 2 tablet  Last Admin: 08/21/18 1121  Dispense Loc:  [facility] 66B           1121 (2 tablet)-Given           sodium chloride (PF) 0.9% PF flush 3 mL  Dose: 3 mL  Freq: EVERY 8 HOURS Route: IK  Start: 08/17/18 2350   Admin Instructions: And Q1H PRN, to lock peripheral IV dormant line.    Admin. Amount: 3 mL  Last Admin: 08/20/18 1617  Dispense Loc: [facility] Floor Stock  Volume: 3 mL   Current Line: Peripheral IV 08/20/18 Right Lower forearm              0806 (3 mL)-Given       (1907)-Not Given        (0242)-Not Given       (0805)-Not Given       1821 (3 mL)-Given        0019 (3 mL)-Given       1143 (3 mL)-Given       1617 (3 mL)-Given        (0154)-Not Given       (0741)-Not Given       [ ] 1550       [ ] 2350           sodium chloride (PF) 0.9% PF flush 3 mL  Dose: 3 mL  Freq: EVERY 1 HOUR PRN Route: IK  PRN Reason: line flush  Start: 08/17/18 2349   Admin Instructions: for peripheral IV flush post IV meds    Admin. Amount: 3 mL  Dispense Loc: [facility] Floor Stock  Volume: 3 mL               timolol (TIMOPTIC) 0.5 % ophthalmic solution 1 drop  Dose: 1 drop  Freq: DAILY Route: Both Eyes  Start: 08/18/18 0800   Admin. Amount: 1 drop  Last Admin: 08/21/18 0843  Dispense Loc:  Main Pharmacy  Volume: 5 mL        0807 (1 drop)-Given        1010 (1 drop)-Given        0754 (1 drop)-Given        0843 (1 drop)-Given          Discontinued Medications  Medications 08/15/18 08/16/18 08/17/18 08/18/18 08/19/18 08/20/18 08/21/18         Dose: 600 mg  Freq: 2 TIMES DAILY Route: PO  Start:  08/18/18 1136   End: 08/19/18 1000   Admin Instructions: DO NOT CRUSH.    Admin. Amount: 1 tablet (1 × 600 mg tablet)  Last Admin: 08/19/18 0806  Dispense Loc:  [facility] 66B        1313 (600 mg)-Given       1905 (600 mg)-Given        0806 (600 mg)-Given       1000-Med Discontinued           Rate: 75 mL/hr   Freq: CONTINUOUS Route: IV  Start: 08/18/18 1331   End: 08/19/18 1057   Last Admin: 08/19/18 0127  Dispense Loc: [facility] Stock  Volume: 1,000 mL        1407 ( )-New Bag       1817 ( )-Rate/Dose Change        0127 ( )-New Bag       1057-Med Discontinued

## 2018-08-17 NOTE — ED PROVIDER NOTES
History     Chief Complaint:  weakness, shortness of breath, and cough    The history is provided by the patient and a relative.      Delmis Quarles is a 89 year old female with a history of HTN, blood clots, and neuropathy who presents to the emergency department with her daughter for evaluation of weakness, shortness of breath, and cough. For the past few weeks, the patient reports shortness of breath and cough. The patient has was started on antibiotics five days ago by her PCP for presumed pneumonia. Since then, the patient reports the continuation of cough and shortness of breath. Then in the past few days, the patient reports increasing fatigue and weakness along with having increasing difficulty lifting her legs, noting she has neuropathy, but is unsure if there is more going on with her aside from the neuropathy. Then today, the patient's daughter states the patient went downhill, noting the patient was not able to care for herself secondary to her shortness of breath, cough, fatigue, and weakness. The patient reports having a chest xray today, which was negative for remarkable findings. Given this and the patient's declining health, the patient presents here to the ED for evaluation.      Here, the patient denies current shortness of breath, chest pain, abdominal pain, nausea, and vomiting. She continues to complain of her recent fatigue and weakness. She denies history of heart failure, CHF, and MI, smoking, and asthma. Of note, the patient resides in independently living but daughter states they are looking into assisted living. Patient has urinary incontinence but manages this on her own.     Allergies:  Trospium   Codeine  Penicillins  Sulfa Drugs     Medications:    Aspirin, 81 mg  Atenolol  Benazepril  Bumex  Estradiol  Allegra  Flonase  Gabapentin  Xalatan  Prilosec  Timolol  Aldactone  Zofran    Past Medical History:    Osteoarthritis   Spinal fusion  Neuropathy  HTN  Osteoporosis  Diffuse  "cystic mastopathy  Gastric ulcer  Lymphedema  Embolism and thrombosis  Cervicalgia  Stricture and stenosis of esophagus  Blood transfusion    Past Surgical History:    Bilateral toe amputation  Right and left knee surgery  Thoracic and lumbar spine fusion, two levels  Appendectomy  Total hysterectomy  Tonsillectomy and Adenoidectomy  Repair Hammer Toe  Optical tracking system fusion posterior spine  Ligation/strip veins    Family History:    CAD: father, brother, maternal uncle  Leukemia  Sjogren's disease    Social History:  Negative for tobacco use.  Negative for alcohol use.  Patient presents with her daughter  Patient resides independently  Marital Status:        Review of Systems   Constitutional: Positive for fatigue.   Respiratory: Positive for cough. Negative for shortness of breath.    Cardiovascular: Negative for chest pain.   Gastrointestinal: Negative for abdominal pain, nausea and vomiting.   Genitourinary:        Positive for urinary incontinence    Neurological: Positive for weakness.   All other systems reviewed and are negative.    Physical Exam   First Vitals:  BP: 175/77  Pulse: 81  Temp: 98.8  F (37.1  C)  Resp: 16  Height: 160 cm (5' 3\")  Weight: 65.8 kg (145 lb)  SpO2: 99 %    Physical Exam  General: Alert, appears frail and elderly. Cooperative.     In mild distress  HEENT:  Head:  Atraumatic  Ears:  External ears are normal  Mouth/Throat:  Oropharynx is without erythema or exudate and mucous membranes are dry.   Eyes:   Conjunctivae normal and EOM are normal. No scleral icterus.    Pupils are equal, round, and reactive to light.   Neck:   Normal range of motion. Neck supple.  CV:  Normal rate, regular rhythm, normal heart sounds and radial pulses are 2+ and symmetric.   Resp:  Breath sounds are clear bilaterally.  Dry cough    Non-labored, no retractions or accessory muscle use  GI:  Abdomen is soft, no distension, no tenderness. No rebound or guarding.  No CVA tenderness " bilaterally  MS:  Normal range of motion. Trace edema.    Back atraumatic.    No midline cervical, thoracic, or lumbar tenderness  Skin:  Warm and dry.  No rash or lesions noted.  Neuro: Alert. Moving all extremities GCS: 15  Psych:  Normal mood and affect.    Emergency Department Course   ECG:  Indication: cough, weakness  Time: 2305  Vent. Rate 83 bpm. WY interval 280. QRS duration 86. QT/QTc 370/434. P-R-T axis 73 33 18.   Sinus rhythm with 1st degree AV block  Otherwise normal ECG.  No significant change compared to EKG dated 11/16/2017.  Read time: 2311    Imaging:  Radiographic findings were communicated with the patient and family who voiced understanding of the findings.    CT Chest w/ IV contrast - PE protocol:   1. Slight bronchial wall thickening with minimal bronchiectasis and  some mucoid impaction in the left lower lung.  2. No visualized pulmonary was more other acute findings in the chest.  3. Possible 1.7 cm right renal lesion partially visualized. This is  indeterminate. Possibilities include some normal cortical lobulation  versus a hemorrhagic cyst or possibly a small solid lesion. If  clinically indicated, follow-up outpatient contrast-enhanced CT or MRI  could be performed. As per radiology.     Laboratory:  CBC: WBC: 8.7, HGB: 12.4, PLT: 222  BMP: Glucose 117 (H), Sodium 124 (L), Chloride 89 (L), Creatinine 1.16 (H), GFR 44 (L), o/w WNL   1822 Troponin: <0.015  TSH with free T4 reflex: 6.57 (H)  T4 free: 1.35  BNP: 357  2046 Fractional excretion of sodium: Creatinine 17 / Sodium 31  2233 Sodium: 126 (L)    UA with micro: bacteria few, o/w negative    Interventions:    Medications   ipratropium - albuterol 0.5 mg/2.5 mg/3 mL (DUONEB) neb solution 3 mL (not administered)   0.9% sodium chloride BOLUS (500 mLs Intravenous New Bag 8/17/18 2119)   iopamidol (ISOVUE-370) solution 73 mL (73 mLs Intravenous Given 8/17/18 1944)   Saline (62 mLs As instructed Given 8/17/18 1944)       Emergency  Department Course:  1855 Nursing notes and vitals reviewed.  I performed an exam of the patient as documented above.     IV inserted. Medicine administered as documented above. Blood drawn. This was sent to the lab for further testing, results above.    The patient provided a urine sample here in the emergency department. This was sent for laboratory testing, findings above.     The patient was sent for a Chest CT-PE protocol while in the emergency department, findings above.     2100 I rechecked the patient and discussed the results of her workup thus far.     2113  I consulted with Dr. Vega of the hospitalist services. They are in agreement to accept the patient for admission.    EKG obtained in the ED, see results above.     Findings and plan explained to the Patient and daughter who consents to admission. Discussed the patient with Dr. Vega, who will admit the patient to a medical bed for further monitoring, evaluation, and treatment.  Impression & Plan      Medical Decision Making:  Patient is a pleasant 89-year-old female with a past medical history pertinent for hypertension and neuropathy who presents with increasing weakness, shortness of breath and cough.  Patient's workup included labs, urine and CT scan of the chest.  Patient's labs show mild hyponatremia with a sodium of 124.  Patient's urine shows no evidence of urinary tract infection.  Patient had a CT scan of the chest due to persistent cough and concerns for potential infection versus pulmonary embolism.  CT shows slight bronchial wall thickening with minimal bronchiectasis and some mucoid impaction in the left lower lung.  There is no evidence of pulmonary embolism.  EKG shows no acute ischemic changes compared to prior, negative troponin, low concern for ACS, particularly in the setting of no chest pain.  There was an incidental finding of a 1.7 cm right renal lesion that was partially visualized.  I think this lesion is less likely responsible  for the suspected acute hyponatremia.  Patient has mild hyponatremia with a sodium of 124 and was given 500 cc of normal saline here.  Repeat sodium is in process at the conclusion of the 500 cc bolus.  Ultimately patient has global weakness with chronic neuropathy of her lower extremities and decreased strength of her lower extremities.  She likely would require physical rehabilitation and physical therapy and ultimately will be admitted for observation and potential need for placement of a rehabilitation facility.  I spoke with Dr. Vega who agreed to observation admission at this time.  Family and patient understood need for observation admission and potential placement at rehabilitation facility.    Critical Care time:  none    Diagnosis:    ICD-10-CM    1. Cough R05 Fractional excretion of sodium     Sodium   2. Generalized muscle weakness M62.81    3. Physical deconditioning R53.81        Disposition:  Admitted to Dr. Vega     Scribe Disclosure:   I, Sugey Maciel, am serving as a scribe on 8/17/2018 at 6:45 PM to personally document services performed by Murtaza Boykin MD based on my observations and the provider's statements to me.     Sugey Maciel  8/17/2018    EMERGENCY DEPARTMENT       Murtaza Boykin MD  08/17/18 3012

## 2018-08-17 NOTE — IP AVS SNAPSHOT
"[place] [address] [facility]: [phone]                                              INTERAGENCY TRANSFER FORM - PHYSICIAN ORDERS   2018                    Hospital Admission Date: 2018  LIDIA ZHANG   : 1924  Sex: Female        Attending Provider: Donte Palma DO     Allergies:  Trospium, Codeine, Penicillins, Sulfa Drugs    Infection:  None   Service:  HOSPITALIST    Ht:  1.6 m (5' 2.99\")   Wt:  71.5 kg (157 lb 10.1 oz)   Admission Wt:  65.8 kg (145 lb)    BMI:  27.93 kg/m 2   BSA:  1.78 m 2            Patient PCP Information     Provider PCP Type    Sherry Monteiro PA-C General      ED Clinical Impression     Diagnosis Description Comment Added By Time Added    Cough [R05] Cough [R05]  Murtaza Boykin MD 2018  9:06 PM    Generalized muscle weakness [M62.81] Generalized muscle weakness [M62.81]  Murtaza Boykin MD 2018  9:06 PM    Physical deconditioning [R53.81] Physical deconditioning [R53.81]  Murtaza Boykin MD 2018  9:06 PM      Hospital Problems as of 2018              Priority Class Noted POA    Hyponatremia Medium  2018 Yes    Generalized weakness Medium  2018 Yes      Non-Hospital Problems as of 2018              Priority Class Noted    Osteoporosis Medium  2003    Diffuse cystic mastopathy Medium  2003    Stricture and stenosis of esophagus Medium  2004    Embolism and thrombosis (H) Medium  2004    Other lymphedema Medium  3/16/2005    Chronic gastric ulcer Medium  2006    CARDIOVASCULAR SCREENING; LDL GOAL LESS THAN 130 Medium  2/10/2010    Cervicalgia Medium  2010    Hypertension goal BP (blood pressure) < 140/90 Medium  2010    Median nerve injury Medium  2013    Pain in joint, upper arm Medium  2013    Health Care Home Medium  2014    S/P laminectomy with spinal fusion Medium  10/6/2014    Osteoarthritis of left glenohumeral joint Medium  10/29/2015      Code Status " History     Date Active Date Inactive Code Status Order ID Comments User Context    8/21/2018  9:20 AM  DNR/DNI [id number]  Donte Palma DO Outpatient    8/17/2018 11:49 PM 8/21/2018  9:20 AM DNR/DNI [id number]  Wes Vega MD Inpatient    8/5/2014  8:30 AM 8/17/2018 11:49 PM DNR [id number] POLST RECEIVED 7/1/14 PATRICIA Jackson Sherry, LPN Outpatient    5/14/2014  7:42 AM 8/5/2014  8:30 AM Full Code [id number]  Shin Lester MD Outpatient    5/10/2014  8:19 PM 5/14/2014  7:42 AM Full Code [id number]  Chemo Scott MD Inpatient    5/7/2014  6:39 PM 5/10/2014  8:19 PM Full Code [id number]  Chester Sharif MD Inpatient    1/5/2012  4:54 PM 5/7/2014  6:39 PM Full Code [id number]  Hal Bates PA-C Outpatient    1/3/2012  5:41 PM 1/5/2012  4:54 PM Full Code [id number]  Massiel Woodall RN Inpatient         Medication Review      START taking        Dose / Directions Comments    acetaminophen 325 MG tablet   Commonly known as:  TYLENOL   Used for:  Cough        Dose:  650 mg   Take 2 tablets (650 mg) by mouth every 4 hours as needed for mild pain   Quantity:  30 tablet   Refills:  0        furosemide 40 MG tablet   Commonly known as:  LASIX   Used for:  Cough        Dose:  40 mg   Take 1 tablet (40 mg) by mouth daily   Quantity:  180 tablet   Refills:  1        guaiFENesin-dextromethorphan 100-10 MG/5ML syrup   Commonly known as:  ROBITUSSIN DM   Used for:  Cough        Dose:  5 mL   Take 5 mLs by mouth every 4 hours as needed for cough   Quantity:  560 mL   Refills:  0        lisinopril 20 MG tablet   Commonly known as:  PRINIVIL/ZESTRIL   Used for:  Cough   Replaces:  benazepril 20 MG tablet        Dose:  20 mg   Take 1 tablet (20 mg) by mouth 2 times daily   Quantity:  30 tablet   Refills:  0          CONTINUE these medications which may have CHANGED, or have new prescriptions. If we are uncertain of the size of tablets/capsules you have at home, strength may be listed as  something that might have changed.        Dose / Directions Comments    aspirin 81 MG EC tablet   This may have changed:  when to take this   Used for:  Hypertension        Dose:  81 mg   Take 1 tablet (81 mg) by mouth daily   Quantity:  90 tablet   Refills:  3        estradiol 10 MCG Tabs vaginal tablet   Commonly known as:  VAGIFEM   This may have changed:  See the new instructions.   Used for:  Atrophic vaginitis        INSERT 1 TABLET VAGINALLY TWICE WEEKLY   Quantity:  24 tablet   Refills:  0          CONTINUE these medications which have NOT CHANGED        Dose / Directions Comments    calcium + D 600-200 MG-UNIT Tabs   Generic drug:  calcium carbonate-vitamin D        Dose:  1 tablet   Take 1 tablet by mouth 2 times daily   Refills:  0        fexofenadine 180 MG tablet   Commonly known as:  ALLEGRA        Dose:  180 mg   Take 180 mg by mouth daily as needed for allergies   Refills:  0        fluticasone 50 MCG/ACT spray   Commonly known as:  FLONASE   Used for:  Post-nasal drainage        INSTILL 2 SPRAYS INTO BOTH NOSTRILS ONCE DAILY   Quantity:  16 mL   Refills:  0    Office visit needed       gabapentin 100 MG capsule   Commonly known as:  NEURONTIN   Used for:  Neuropathy        200 mg in the morning and 100 mg at noon  200 mg in the evening   Quantity:  450 capsule   Refills:  1        latanoprost 0.005 % ophthalmic solution   Commonly known as:  XALATAN        Dose:  1 drop   Place 1 drop into both eyes At Bedtime   Refills:  0        Multi-vitamin Tabs tablet        Dose:  1 tablet   Take 1 tablet by mouth daily   Refills:  0        omeprazole 20 MG CR capsule   Commonly known as:  priLOSEC   Used for:  Stricture and stenosis of esophagus        TAKE 1 CAPSULE EVERY MORNING DAILY   Quantity:  90 capsule   Refills:  0    Due for office visit for any further refills       timolol 0.5 % ophthalmic solution   Commonly known as:  TIMOPTIC        Dose:  1 drop   Place 1 drop into both eyes daily   Refills:   0        vitamin D 1000 units capsule        2 CAPSULE EVERY DAY   Refills:  0          STOP taking     benazepril 20 MG tablet   Commonly known as:  LOTENSIN   Replaced by:  lisinopril 20 MG tablet           bumetanide 1 MG tablet   Commonly known as:  BUMEX           spironolactone 25 MG tablet   Commonly known as:  ALDACTONE                   Summary of Visit     Reason for your hospital stay       Low sodium level             After Care     Activity - Up with nursing assistance           Advance Diet as Tolerated       Follow this diet upon discharge: Orders Placed This Encounter      Fluid restriction 1200 ML FLUID      Regular Diet Adult       Fall precautions           General info for SNF       Length of Stay Estimate: Short Term Care: Estimated # of Days <30  Condition at Discharge: Improving  Level of care:skilled   Rehabilitation Potential: Excellent  Admission H&P remains valid and up-to-date: Yes  Recent Chemotherapy: N/A  Use Nursing Home Standing Orders: Yes       Mantoux instructions       Give two-step Mantoux (PPD) Per Facility Policy Yes             Referrals     Occupational Therapy Adult Consult       Evaluate and treat as clinically indicated.    Reason: Physical deconditioning.       Physical Therapy Adult Consult       Evaluate and treat as clinically indicated.    Reason:  Physical deconditioning.             Follow-Up Appointment Instructions     Future Labs/Procedures    Follow Up and recommended labs and tests     Comments:    Follow up with [facility] physician.  The following labs/tests are recommended: cbc/bmp.  Follow up with primary care provider.      Follow-Up Appointment Instructions     Follow Up and recommended labs and tests       Follow up with [facility] physician.  The following labs/tests are recommended: cbc/bmp.  Follow up with primary care provider.             Statement of Approval     Ordered          08/21/18 1028  I have reviewed and agree with all the  recommendations and orders detailed in this document.  EFFECTIVE NOW     Approved and electronically signed by:  Donte Palma DO

## 2018-08-17 NOTE — IP AVS SNAPSHOT
` `     [place] [address] [facility]: [phone]                 INTERAGENCY TRANSFER FORM - NOTES (H&P, Discharge Summary, Consults, Procedures, Therapies)   2018                    Hospital Admission Date: 2018  LIDIA ZHANG   : 1924  Sex: Female        Patient PCP Information     Provider PCP Type    Sherry Monteiro PA-C General         History & Physicals      H&P by Wes Vega MD at 2018  9:21 PM     Author:  Wes Vega MD Service:  Hospitalist Author Type:  Physician    Filed:  2018  1:14 AM Date of Service:  2018  9:21 PM Creation Time:  2018  9:21 PM    Status:  Addendum :  Wes Vega MD (Physician)         [facility]    History and Physical  Hospitalist       Date of Admission:  2018    Assessment & Plan   Lidia Zhang is a 89 year old female who presents with[JW1.1] bilateral lower extremity weakness which has been progressive over months found to have hyponatremia    Weakness: Suspect multifactorial with neuropathy and hyponatremia as well as advanced age.  Patient has had progressive weakness, worsening over the past months to the point where she had difficulty getting out of bed today secondary to bilateral lower externally weakness.  Has been evaluated by neurology with recent EMGs and follow-up scheduled for early this coming week.  Noted to have hyponatremia, worse than prior, which is likely contributing.  Family has been looking into increased level of care as patient currently resides in a senior living complex.  -Social work consulted for disposition planning; patient actually has been accepted for assisted living at [facility] in [place].  Uncertain as to degree of assistance patient will need at time of discharge.  -Ambulate with nursing staff 4 times daily[JW1.2]    Hypochloremic hyponatremia:[JW1.1] Serum sodium of 124.  Suspect contribution from both loop diuretic as  "well as poor oral intake.  Note that patient has been mildly hyponatremic over the past year as well, remains on Bumex for blood pressure control and lower extremity edema.  -Diet as tolerated  -Both Bumex and spironolactone have been discontinued at this time. can consider reinitiation of spironolactone if needed for blood pressure support and swelling  -Received 500 mL normal saline in the emergency department with improvement in serum sodium by 2 mEq.  Additional 500 mL over 4 hours with recheck BMP in a.m.[JW1.2] ADDENDUM: following 550 ml NS, serum sodium 128. Additional sodium containing fluids held pending AM BMP.[JW1.3]  -Monitor intake and output  -Recheck sodium at 6 AM, noon 8/18.  Pending a.m. sodium, may potentially require additional saline resuscitation.  Anticipate majority of sodium correction will take place with discontinuation of Bumex.  If patient corrects to the 130 range and safe disposition plan is identified, can continue follow-up in the outpatient setting.  -Ambulate with nursing staff[JW1.2]    Neuropathy:[JW1.4] Suspect this is also contributing to patient's sensation of weakness.  Has described right hand \"weakness\" which is actually her description of her neuropathy.  Strength intact in distal right upper extremity.  Possibly some neurogenic claudication with the sensation of her legs feeling heavy, though patient does not describe the typical forward flexion to improve symptoms of her legs when ambulate and.[JW1.2]  -[JW1.4] continue prior to admission gabapentin[JW1.2]  -Request [facility] clinic of neurology records re: recent EMG testing[JW1.4]    Bronchitis: Recently prescribed anti-infectives through her primary care provider for possible pneumonia.  Chest x-ray without evidence of pneumonia in outpatient setting, CT of chest with some findings of bronchitis, no pneumonia.  Appears to have been precipitated by a recent upper respiratory illness over the past week, nasal congestion " symptoms have since resolved.  -Continue prior to admission Allegra, Flonase  -No anti-infectives currently prescribed.  Monitor for fevers or hypoxia.    Vaginal dryness:  -Continue prior to admission estradiol suppository    History of gastric ulcer, esophageal stricture:  -Continue prior to admission omeprazole    Right renal mass: Incidental finding of 1.7 cm indeterminant right renal cyst on CT of chest 8/17/18.  Findings discussed with patient as well as daughter and son-in-law at bedside on admission.  In the setting of advanced age and desire for no further surgical intervention, at this time they are opting not to pursue further diagnostic imaging.  Aware of finding, and may consider in the future.  This decision making process is consistent with recent decision not to pursue further mammograms.  -Recommend reassessment of decision to pursue diagnostic imaging by primary care physician assistant following discharge in the coming months.[JW1.2]    # Pain Assessment:[JW1.1]   Current Pain Score[JW1.5] 8/17/18[JW1.2]   Pain score (0-10)[JW1.5] 0[JW1.2]   Pain location -   Pain descriptors    CPOT pain score -[JW1.5]   Delmis kauffman pain level was assessed and she currently denies pain.[JW1.2]        DVT Prophylaxis:[JW1.1] Ambulate every shift[JW1.2]    Code Status:[JW1.1] DNR / DNI. Confirmed with patient and family at bedside; note that this is a change from prior code status.  Specifics discussed with patient and family included that patient would not desire tube feeding.  This is also a change from prior POLST.[JW1.2]    Disposition: Expected discharge[JW1.1] likely 8/18/18 evening vs 8/19/18.[JW1.2]    Wes Hauser [place]    Primary Care Physician   Sherry Monteiro    Chief Complaint   Weakness    History is obtained from the patient, chart review, discussion with Dr Boykin in the ED, discussion with daughter[JW1.1] and son-in-law[JW1.2] at bedside[JW1.1].[JW1.2]    History of Present Illness   Delmis MCGOWAN  Willam is a 89 year old female who presents with[JW1.1] weakness limiting her ability to get out of bed this morning.  Patient is a 89-year-old female who lives independently in a senior living complex.  Receives assistance from her daughter intermittently, though family has been looking into an increased level of care for the patient over the past months. History of intermittent falls. Patient actually has an assisted living apartment reserved at [facility] with a plan to move in the next 1-2 weeks after room has been repainted/refinished.    Patient presents to the emergency department today as she felt too weak to ambulate.  Over the past week, patient has had an upper respiratory illness which was initially precipitated by nasal congestion and some postnasal drip.  Nasal symptoms have resolved, the patient now with a cough.  Was given some anti-infectives by her primary care group, [facility] physicians, though I am unclear as to what antibiotic this was.  Subsequently a chest x-ray was performed which was negative, and anti-infectives were discontinued as of 8/17/18.  Given patient was too weak to ambulate and currently living independently, patient's family brought her to the emergency department where laboratory analysis was remarkable for hypochloremic hyponatremia with hyponatremia to 124.  History of hyponatremia has been noted over the past year, though this is more notable.  Patient remains on Bumex daily, and over the past week, with a respiratory illness, patient describes eating less at her noon meal.  Has not been losing weight, though is not a big eater overall.  Patient actually has restricted herself somewhat given her concerns for developing diabetes despite her age of 89.    Patient has a history of neuropathy involving bilateral lower extremities as well as distal upper extremities right greater than left.  No diabetes, though appears to have some prediabetes.  Follows with  [facility] and actually underwent EMG testing recently with follow-up early next week to discuss findings/results.  Unfortunately, these results are not available/scanned in through our system at this time.    A CT of the chest was performed in the emergency department given patient's rattling cough.  Noted to have some bronchial thickening consistent with bronchitis with some mucus, no consolidation/pneumonia appreciated.  Incidentally found to have a right renal cyst/mass.  This was discussed with patient and family as above.[JW1.2]    Past Medical History    I have reviewed this patient's medical history and updated it with pertinent information if needed.   Past Medical History:   Diagnosis Date     Arthritis      Blood transfusion      Chronic gastric ulcer without mention of hemorrhage, perforation, without mention of obstruction 5/5/2006    EGD, NSAID induced; PPI indefinitely,      Diffuse cystic mastopathy      Embolism and thrombosis of unspecified site 1992    Post phlebitic syndrome; [employer] stocking     Essential hypertension, benign 1972     Neuropathy     FEET AND HANDS     Osteoporosis, unspecified 1/00    Dexa done in [place], [place]; Fosamax started 1/00     Other lymphedema      Stricture and stenosis of esophagus 2000    dilitation        Past Surgical History   I have reviewed this patient's surgical history and updated it with pertinent information if needed.  Past Surgical History:   Procedure Laterality Date     AMPUTATE TOE(S) BILATERAL  4/23/2014    Procedure: AMPUTATE TOE(S) BILATERAL;  Surgeon: Yelitza Elise, DPM, Pod;  Location: [facility] OR     ARTHROPLASTY KNEE  2008    left     ARTHROPLASTY KNEE  1/3/2012    Procedure:ARTHROPLASTY KNEE; Right Total Knee Arthroplasty,       C APPENDECTOMY  1954     C TOTAL ABDOM HYSTERECTOMY  1972    ROCK/BSO     FUSION THORACIC LUMBAR ANTERIOR TWO LEVELS  5/10/2014    Procedure: FUSION THORACIC LUMBAR ANTERIOR TWO LEVELS;  Surgeon:  Tomás Worthy MD;  Location: [facility] OR      FLEX SIGMOIDOSCOPY W/WO NATALIE SPEC BY BRUSH/WASH      colon screen- Flex by Dr. Rosario      REMOVE TONSILS/ADENOIDS,12+ Y/O  ~1938     LIGATN/STRIP LONG & SHORT SAPHEN      RFA of right vein     OPTICAL TRACKING SYSTEM FUSION POSTERIOR SPINE LUMBAR  5/10/2014    Procedure: OPTICAL TRACKING SYSTEM FUSION SPINE POSTERIOR LUMBAR ONE LEVEL;  Surgeon: Tomás Worthy MD;  Location: [facility] OR     REPAIR HAMMER TOE  2014    Procedure: REPAIR HAMMER TOE;  Surgeon: Yelitza Elise DPM, Pod;  Location: [facility] OR       Prior to Admission Medications   Prior to Admission Medications   Prescriptions Last Dose Informant Patient Reported? Taking?   Calcium Carbonate-Vitamin D (CALCIUM + D) 600-200 MG-UNIT per tablet   Yes No   Sig: Take 2 tablets by mouth daily.   MULTIVITAMIN OR   Yes No   Sig: None Entered   OMEGA 3 PO   Yes No   Sig: DAILY   VITAMIN D 1000 UNIT PO CAPS   Yes No   Si CAPSULE EVERY DAY   aspirin 81 MG EC tablet   No No   Sig: Take 1 tablet (81 mg) by mouth daily   atenolol (TENORMIN) 50 MG tablet   No No   Sig: Take 1 tablet (50 mg) by mouth every morning   benazepril (LOTENSIN) 20 MG tablet   No No   Sig: Take 2 tablets (40 mg) by mouth daily May split dose if desired or take them both at one time.   bumetanide (BUMEX) 1 MG tablet   No No   Sig: TAKE 1/2 TABLET BY MOUTH DAILY   estradiol (VAGIFEM) 10 MCG TABS vaginal tablet   No No   Sig: INSERT 1 TABLET VAGINALLY TWICE WEEKLY   fexofenadine (ALLEGRA) 180 MG tablet   No No   Sig: Take 1 tablet by mouth daily.   fluticasone (FLONASE) 50 MCG/ACT spray   No No   Sig: Spray 2 sprays into both nostrils daily   fluticasone (FLONASE) 50 MCG/ACT spray   No No   Sig: INSTILL 2 SPRAYS INTO BOTH NOSTRILS ONCE DAILY   gabapentin (NEURONTIN) 100 MG capsule   No No   Si mg in the morning and 100 mg at noon  200 mg in the evening   latanoprost (XALATAN) 0.005 % ophthalmic solution   Yes No   Sig: Place 1  drop Into the left eye At Bedtime.   omeprazole (PRILOSEC) 20 MG CR capsule   No No   Sig: TAKE 1 CAPSULE EVERY MORNING DAILY   ondansetron (ZOFRAN ODT) 4 MG ODT tab   No No   Sig: Take 1-2 tablets (4-8 mg) by mouth 3 times daily (before meals)   sodium chloride (OCEAN) 0.65 % nasal spray   No No   Sig: Spray 1 spray into both nostrils daily as needed for congestion   spironolactone (ALDACTONE) 25 MG tablet   No No   Sig: Take 1 tablet (25 mg) by mouth daily (with lunch)   timolol (TIMOPTIC) 0.5 % ophthalmic solution   Yes No   Sig: Place 1 drop into both eyes daily      Facility-Administered Medications: None     Allergies   Allergies   Allergen Reactions     Trospium Swelling     Lower extremity edema     Codeine      dry mouth and nausea     Penicillins      dry mouth     Sulfa Drugs      dry mouth       Social History   I have reviewed this patient's social history and updated it with pertinent information if needed. Delmis Quarles  reports that she has never smoked. She has never used smokeless tobacco. She reports that she does not drink alcohol or use illicit drugs.     Family History   I have reviewed this patient's family history and updated it with pertinent information if needed.   Family History   Problem Relation Age of Onset     C.A.D. Brother       at 67     C.A.D. Father      CHF     C.A.D. Maternal Uncle      C.A.D. Maternal Uncle      Blood Disease Brother      type of Leukemia      Daughter      Sjogren's       Review of Systems   The 10 point Review of Systems is negative other than noted in the HPI or here.[JW1.1]   No fevers, no shortness of breath. Rattling cough has been present for the past 4 or so days  History of intermittent falls. Uses a walker at baseline. No significant forward flexion with ambulation. Has been able to ambulate w/ walker at [facility]'s w/ daughter over past month.  Decreased oral intake over past week w/ upper respiratory illness. No weight loss.[JW1.2]    Physical  Exam   Temp: 98.8  F (37.1  C) Temp src: Oral BP: 175/77 Pulse: 81   Resp: 16 SpO2: 99 %      Vital Signs with Ranges  Temp:  [98.8  F (37.1  C)] 98.8  F (37.1  C)  Pulse:  [81] 81  Resp:  [16] 16  BP: (175)/(77) 175/77  SpO2:  [99 %] 99 %  145 lbs 0 oz    Constitutional: no acute distress, alert, conversant[JW1.1] elderly female lying comfortably in bed.[JW1.2]  Eyes: no scleral icterus or injection  HEENT: moist mucous membranes  Respiratory: breath sounds[JW1.1] with few basilar rhonchi, no wheezes, good air movement throughout lung fields.  Occasional rattling cough.[JW1.2]  Cardiovascular: regular rate and rhythm, no murmur  GI: abdomen soft, non-tender, normoactive bowel sounds, no masses  Lymph/Hematologic:[JW1.1] 1+ pitting bilateral lower extremity edema[JW1.2]  Genitourinary: not examined  Musculoskeletal:[JW1.1] Mild diffuse muscular wasting all extremities associated with advanced age.  No fasciculations.[JW1.2]  Neurologic: mental status grossly intact, no focal deficits, alert[JW1.1].  4/5 strength in bilateral hip flexors, equal.  5/5 right  strength and wrist strength against resistance with flexion and extension.[JW1.2]  Psychiatric: normal affect[JW1.1], pleasant[JW1.2]    Data   Data reviewed today:  I personally reviewed[JW1.1] the chest CT image(s) showing Some bronchial wall thickening, right renal cyst/mass approximately 2 cm diameter[JW1.2].    Recent Labs  Lab 08/17/18  1822   WBC 8.7   HGB 12.4   MCV 88      *   POTASSIUM 4.1   CHLORIDE 89*   CO2 27   BUN 25   CR 1.16*   ANIONGAP 8   MAKI 9.1   *   TROPI <0.015       Recent Results (from the past 24 hour(s))   CT Chest Pulmonary Embolism w Contrast    Narrative    CT CHEST PULMONARY EMBOLISM WITH CONTRAST 8/17/2018 8:10 PM     HISTORY: Weakness, cough for one week. Negative chest x-ray. Concern  for pneumonia versus pulmonary embolism.      TECHNIQUE: Volumetric acquisition of the chest  after the  administration of  73ml Isovue-370 IV contrast. Radiation dose for this  scan was reduced using automated exposure control, adjustment of the  mA and/or kV according to patient size, or iterative reconstruction  technique.     COMPARISON: 10/18/2005 and 10/16/2006    FINDINGS: No visualized pulmonary embolism. Normal heart size.  Coronary artery calcifications. Mild atheromatous changes of the  thoracic aorta. Two tiny left upper lung nodules are stable and  considered benign. Mild bronchial wall thickening in the left lower  lung with some mucoid impaction and minimal bronchiectasis. No  consolidative infiltrates, pleural effusions or other acute findings.  No enlarged mediastinal or hilar lymph nodes.    Two right renal cysts measuring up to 3.4 cm. Partially visualized 1.7  cm lesion at the lateral aspect of the right mid kidney could be a  normal lobulation or an indeterminant lesion either a hemorrhagic cyst  or possibly a small solid lesion. This could be better evaluated with  follow-up outpatient CT or MRI, if clinically indicated.      Impression    IMPRESSION:  1. Slight bronchial wall thickening with minimal bronchiectasis and  some mucoid impaction in the left lower lung.  2. No visualized pulmonary was more other acute findings in the chest.  3. Possible 1.7 cm right renal lesion partially visualized. This is  indeterminate. Possibilities include some normal cortical lobulation  versus a hemorrhagic cyst or possibly a small solid lesion. If  clinically indicated, follow-up outpatient contrast-enhanced CT or MRI  could be performed.[JW1.1]        Revision History        User Key Date/Time User Provider Type Action    > JW1.3 8/18/2018  1:14 AM Wes Vega MD Physician Addend     JW1.5 8/18/2018 12:07 AM Wes Vega MD Physician Sign     JW1.2 8/17/2018 11:30 PM Wes Vega MD Physician      JW1.4 8/17/2018 10:21 PM Wes Vega MD Physician      JW1.1 8/17/2018  9:21 PM Wes Vega MD  Physician                   Discharge Summaries     No notes of this type exist for this encounter.      Consult Notes     No notes of this type exist for this encounter.         Progress Notes - Physician (Notes from 18 through 18)      Progress Notes by Ad March RN at 2018  2:22 PM     Author:  Ad March RN Service:  [facility] Nurse Author Type:  Registered Nurse    Filed:  2018  2:40 PM Date of Service:  2018  2:22 PM Creation Time:  2018  2:22 PM    Status:  Signed :  Ad March RN (Registered Nurse)         [facility] Nurse Inpatient Adult Pressure Injury (PI) Assessment     Initial Assessment of PI(s) on pt's:   Left IT    Data:   Patient History:      per MD note(s): 89 year old female who was admitted on 2018 after presenting with significant wekaness.       Acute on chronic lower extremity weakness: Suspect multifactorial with neuropathy and hyponatremia as well as advanced age.  Patient has had progressive weakness, worsening over the past months, but with acute change day of admission to the point where she could not get out of bed. She has been evaluated by neurology with recent EMGs and follow-up scheduled for early this coming week.  Noted to have hyponatremia, worse than prior, which is likely contributing.  Family has been looking into increased level of care as patient currently resides in a senior living complex. She notes improvement today after receiving IVF and is able to ambulate though requires nursing assistance due to significant unsteadiness. She has no focal weakness on exam.   - PT following.   - Follow up with Neurology as scheduled to review EMG results     Anthony Assessment and sub scores:   Anthony Score  Av.8  Min: 17  Max: 18    Positioning: Pillows,     Mattress:  Standard , Atmos Air mattress    Moisture Management:  Incontinence Protocol and Diaper    Catheter secured? Not  applicable    Current Diet / Nutrition:           Active Diet Order      Regular Diet Adult      Labs:   Recent Labs   Lab Test  08/20/18   0806   11/16/17   0808  10/26/16   0949   03/19/15   1248   ALBUMIN   --    --    --    --    --   4.0   HGB  11.9   < >  12.7   --    < >   --    INR   --    --   1.07   --    --    --    WBC  7.2   < >  7.3   --    < >   --    A1C   --    --    --   5.5   < >  5.7    < > = values in this interval not displayed.                                                                                                                          Pressure Injury Assessment  (location):   Right IT    Wound History:   Community acquired PI, pt incontinent of urine   Moist red granulation tissue with ring of white maceration    Specific Dimensions (length x width x depth, in cm) :   1.4cm x 2.1cm x 0.2cm    Tunneling:  N/A    Undermining: N/A    Palpation of the wound bed:  normal    Slough appearance:  none    Eschar appearance:  none    Periwound Skin: intact, edema and maceration,      Color: white maceration    Temperature  normal     Drainage:  Small serous         Odor: none    Pain:  minimal          Intervention:     Patient's chart evaluated.      Anthony Interventions:  Current Anthony Interventions and Care Plan reviewed and updated, appropriate at this time.    Assessed wound on buttocks with the assistance of the RN    Orders  Written    Supplies  reviewed    Discussed plan of care with Patient and Nurse    All patient / family questions answered:  YES           Assessment:     Pressure Injury (PI) located on right IT: stage 2- POA, small wound, no obvious s/s of infection.  Because pt is frequently wet due to incontinence will just use barrier cream, powder and PIP measures to help heal and prevent further injury vs trying to keep a dressing on site.          Plan:     Nursing to notify the Provider(s) and re-consult the [facility] Nurse if wound(s) deteriorate(s)or if the wound care plan  "needs reevaluation.    If pt is refusing to turn or reposition they must be educated on the  potential injury from not off loading pressure.  Then this \"educated refusal\" needs to be documented as an \"educated refusal to turn/ reposition\" and document if alert, etc.  Plan for wound care to wound located on right IT: every 4 hours and prn    1. Clean skin with Adama Cleanse and Protect    2. Apply CriticAid Paste to areas needing protection, especially the right IT wound.    3. Dust with baby powder    Pressure INJURY Prevention Measures (PIP):  If pt is refusing to turn or reposition they must be educated on the  potential injury from not off loading pressure.  Then this \"educated refusal\" needs to be documented as an \"educated refusal to turn/ reposition\" and document if alert, etc.    1. Repositioning:  Pt must be repositioned for good skin health.  If pt refusing or this is not being done then the charge nurse, nurse manager and md need to be notified to help intervene and to know that there is an issue    Bed:  Reposition pt MINIMALLY every 1-2 hours in bed to relieve pressure and promote perfusion to tissue. Also try to position to get airflow to pt s backside, etc. If necessary consider use of Fluidized Positioner Pads ((234221 small/ [id number] med/ [id number] large) to help maintain pt in good offloading-position.               Use pillows in the lower back and upper thighs to support postioning but keep pillows off butt to minimize any pressure.    Chair:  When up to chair pt should not sit for longer than one hour total before either standing or returning to bed for at least 10 minutes, again to relieve pressure and promote perfusion to tissue.     o Pt should also sit on a chair cushion (#[id number]) when up to the chair.  o Do NOT use a donut to sit on.  This concentrates pressure in a smaller area and creates a higher potential for injury where the cushion is.   o When pt returns to bed he/she should be positioned " "on side  o Do not sit on a Z-Flow Pad.  This is not a chair cushion, it is for positioning  If pt is refusing to turn or reposition they must be educated on the  potential injury from not off loading pressure.  Then this \"educated refusal\" needs to be documented as an \"educated refusal to turn/ reposition\" and document if alert, etc.  2.  Patient's skin must be kept clean and dry- the areas are only clean when you see the base of the skin fold, especially the perineal area.  Moist, macerated tissue is more susceptible to injury.           Follow Incontinence Protocol found in [employer].  Dust with baby powder BID to help with chaffing, decrease warmth from friction and increase comfort.         NO BRIEFS WITH CATHETERS  3.  Follow the bed algorithm to consider a specialty mattress  4.  If able keep head of bed below 30 degrees.   5.  Follow Anthony Risk recommendations  6.  Be aware of the bony prominences!    Elevate heels at all times, consider using heel lift boots, Chung or [place]    Apply skin prep to bony prominences such as elbows, heels, hips- and consider wearing long sleeves and/or pants at al times  7.  Remember to perform full skin inspection 2 x / day- unless ordered NOT to order an area skin must be assessed.  If not able to do a full skin inspection then document full inspection along with the exception of what was not assessed.             [facility] Nurse will return: weekly and prn[ZP1.1]       Revision History        User Key Date/Time User Provider Type Action    > ZP1.1 8/20/2018  2:40 PM Ad March RN Registered Nurse Sign            Progress Notes by Re Epperson RD, LD at 8/20/2018  2:21 PM     Author:  Re Epperson RD, LD Service:  Nutrition Author Type:  Registered Dietitian    Filed:  8/20/2018  2:24 PM Date of Service:  8/20/2018  2:21 PM Creation Time:  8/20/2018  2:21 PM    Status:  Signed :  Re Epperson RD, LD (Registered Dietitian)         Received admission screen " referral - stageable PI or wound.  Will defer assessment until seen by WOCN and wound is staged.    Re Epperson RD  Pager [phone][CM1.1]        Revision History        User Key Date/Time User Provider Type Action    > CM1.1 8/20/2018  2:24 PM Re Epperson RD, LD Registered Dietitian Sign            Progress Notes by Donte Palma DO at 8/20/2018  8:50 AM     Author:  Donte Palma DO Service:  Hospitalist Author Type:  Physician    Filed:  8/20/2018 11:08 AM Date of Service:  8/20/2018  8:50 AM Creation Time:  8/20/2018  8:50 AM    Status:  Signed :  Donte Palma DO (Physician)         [facility]  Hospitalist Progress Note        Donte Palma DO  08/20/2018        Interval History:[ZA1.1]      Patient states she is still feeling dizzy and lightheaded. Discussed plan of care and improvement of sodium level.[ZA1.2]          Assessment and Plan:        Delmis Quarles is a 89 year old female who was admitted on 8/17/2018 after presenting with significant wekaness.       Acute on chronic lower extremity weakness: Suspect multifactorial with neuropathy and hyponatremia as well as advanced age.  Patient has had progressive weakness, worsening over the past months, but with acute change day of admission to the point where she could not get out of bed. She has been evaluated by neurology with recent EMGs and follow-up scheduled for early this coming week.  Noted to have hyponatremia, worse than prior, which is likely contributing.  Family has been looking into increased level of care as patient currently resides in a senior living complex. She notes improvement today after receiving IVF and is able to ambulate though requires nursing assistance due to significant unsteadiness. She has no focal weakness on exam.   - PT following.   - Follow up with Neurology as scheduled to review EMG results      Hypochloremic hyponatremia: Serum sodium of 124 on  admission.  Suspect contribution from both loop diuretic as well as poor oral intake.  Note that patient has been mildly hyponatremic over the past year as well, remains on Bumex for blood pressure control and lower extremity edema.  - Diet as tolerated  - Hold bumex and spironolactone at this time.       Bronchitis: Recently prescribed anti-infectives through her primary care provider for possible pneumonia.  Chest x-ray without evidence of pneumonia in outpatient setting, CT of chest with some findings of bronchitis, no pneumonia.    - Guaifenesin prn.       Neuropathy: Suspect this is also contributing to patient's sensation of weakness.     - Continue prior to admission gabapentin      Vaginal dryness: Stable.   - Continue prior to admission estradiol suppository      History of gastric ulcer, esophageal stricture: Stable.   - Continue prior to admission omeprazole      Right renal mass: Incidental finding of 1.7 cm indeterminant right renal cyst on CT of chest 8/17/18.  Findings discussed with patient as well as daughter and son-in-law at bedside on admission, per report.  In the setting of advanced age and desire for no further surgical intervention, at this time they are opting not to pursue further diagnostic imaging.  Aware of finding, and may consider in the future.  This decision making process is consistent with recent decision not to pursue further mammograms.  - Recommend reassessment of decision to pursue diagnostic imaging by primary care physician assistant following discharge      DVT Prophylaxis: Pneumatic Compression Devices     Code: DNR/DNI      Disposition: Expected discharge[ZA1.1] when sodium normalized, dizziness resolved, likely 8/21.[ZA1.2]     Pain: # Pain Assessment:  Current Pain Score 8/20/2018   Patient currently in pain? denies   Pain score (0-10) -   Pain location -   Pain descriptors -   CPOT pain score -[ZA1.1]   Delmis kauffman pain level was assessed and she currently denies  "pain.[ZA1.2]                     Physical Exam:           Blood pressure 135/57, pulse 65, temperature 97.8  F (36.6  C), temperature source Oral, resp. rate 16, height 1.6 m (5' 2.99\"), weight 67.8 kg (149 lb 7.6 oz), SpO2 95 %, not currently breastfeeding.    Vitals:    18 1806 18 2358 18 0654   Weight: 65.8 kg (145 lb) 69.9 kg (154 lb 3.2 oz) 67.8 kg (149 lb 7.6 oz)       Vital Sign Ranges  Temperature Temp  Av.1  F (36.7  C)  Min: 97.8  F (36.6  C)  Max: 98.4  F (36.9  C)   Blood pressure Systolic (24hrs), Av , Min:131 , Max:146        Diastolic (24hrs), Av, Min:57, Max:67      Pulse Pulse  Av.8  Min: 65  Max: 84   Respirations Resp  Av  Min: 16  Max: 16   Pulse oximetry SpO2  Av %  Min: 95 %  Max: 97 %     Vital Signs with Ranges  Temp:  [97.8  F (36.6  C)-98.4  F (36.9  C)] 97.8  F (36.6  C)  Pulse:  [65-84] 65  Resp:  [16] 16  BP: (131-146)/(57-67) 135/57  SpO2:  [95 %-97 %] 95 %    I/O Last 3 Shifts:   I/O last 3 completed shifts:  In: 440 [P.O.:440]  Out: -     I/O past 24 hours:     Intake/Output Summary (Last 24 hours) at 18 0850  Last data filed at 18 2300   Gross per 24 hour   Intake              440 ml   Output                0 ml   Net              440 ml     GENERAL: Alert and confused. NAD. Conversational, appropriate.[ZA1.1] Dizzy.[ZA1.2]   HEENT: Normocephalic. EOMI. No icterus or injection. Nares normal.   LUNGS: Decrement to bases, non-productive cough. No dyspnea at rest.   HEART: Regular rate. Extremities perfused.   ABDOMEN: Soft, nontender, and nondistended. Positive bowel sounds.   EXTREMITIES: LE edema noted.   NEUROLOGIC: Moves extremities x4. Follows commands.          Prior to Admission Medications:        Prescriptions Prior to Admission   Medication Sig Dispense Refill Last Dose     aspirin 81 MG EC tablet Take 1 tablet (81 mg) by mouth daily (Patient taking differently: Take 81 mg by mouth every evening ) 90 tablet 3 " 8/16/2018 at pm     benazepril (LOTENSIN) 20 MG tablet Take 2 tablets (40 mg) by mouth daily May split dose if desired or take them both at one time. (Patient taking differently: Take 20 mg by mouth 2 times daily May split dose if desired or take them both at one time.) 180 tablet 1 8/17/2018 at am x 1 dose     bumetanide (BUMEX) 1 MG tablet TAKE 1/2 TABLET BY MOUTH DAILY 15 tablet 0 8/17/2018 at 1200     Calcium Carbonate-Vitamin D (CALCIUM + D) 600-200 MG-UNIT per tablet Take 1 tablet by mouth 2 times daily    8/17/2018 at am x 1 dose     estradiol (VAGIFEM) 10 MCG TABS vaginal tablet INSERT 1 TABLET VAGINALLY TWICE WEEKLY (Patient taking differently: INSERT 1 TABLET VAGINALLY TWICE WEEKLY Tues/Fri) 24 tablet 0 8/14/2018     fexofenadine (ALLEGRA) 180 MG tablet Take 180 mg by mouth daily as needed for allergies   prn med     fluticasone (FLONASE) 50 MCG/ACT spray INSTILL 2 SPRAYS INTO BOTH NOSTRILS ONCE DAILY 16 mL 0 8/17/2018 at am     gabapentin (NEURONTIN) 100 MG capsule 200 mg in the morning and 100 mg at noon  200 mg in the evening 450 capsule 1 8/17/2018 at x 2 doses     latanoprost (XALATAN) 0.005 % ophthalmic solution Place 1 drop into both eyes At Bedtime    8/16/2018 at pm     multivitamin, therapeutic with minerals (MULTI-VITAMIN) TABS tablet Take 1 tablet by mouth daily   8/17/2018 at am     omeprazole (PRILOSEC) 20 MG CR capsule TAKE 1 CAPSULE EVERY MORNING DAILY 90 capsule 0 8/17/2018 at am     spironolactone (ALDACTONE) 25 MG tablet Take 1 tablet (25 mg) by mouth daily (with lunch) 90 tablet 1 8/17/2018 at 1200     timolol (TIMOPTIC) 0.5 % ophthalmic solution Place 1 drop into both eyes daily   8/17/2018 at am     VITAMIN D 1000 UNIT PO CAPS 2 CAPSULE EVERY DAY  0 8/17/2018 at am            Medications:        Current Facility-Administered Medications   Medication Last Rate     Current Facility-Administered Medications   Medication Dose Route Frequency     aspirin  81 mg Oral QPM     estradiol  10  mcg Vaginal Once per day on Tue Fri     fluticasone  2 spray Both Nostrils Daily     gabapentin  100 mg Oral Daily     gabapentin  200 mg Oral BID     latanoprost  1 drop Both Eyes At Bedtime     lisinopril  20 mg Oral BID     omeprazole  20 mg Oral QAM AC     sodium chloride (PF)  3 mL Intracatheter Q8H     timolol  1 drop Both Eyes Daily     Current Facility-Administered Medications   Medication Dose Route Frequency     acetaminophen  650 mg Rectal Q4H PRN     acetaminophen  650 mg Oral Q4H PRN     albuterol  2.5 mg Nebulization Q2H PRN     bisacodyl  10 mg Rectal Daily PRN     fexofenadine  180 mg Oral Daily PRN     guaiFENesin  10 mL Oral Q4H PRN     guaiFENesin-dextromethorphan  5 mL Oral Q4H PRN     lidocaine 4%   Topical Q1H PRN     lidocaine (buffered or not buffered)  1 mL Other Q1H PRN     melatonin  1 mg Oral At Bedtime PRN     naloxone  0.1-0.4 mg Intravenous Q2 Min PRN     ondansetron  4 mg Oral Q6H PRN    Or     ondansetron  4 mg Intravenous Q6H PRN     polyethylene glycol  17 g Oral Daily PRN     prochlorperazine  5 mg Intravenous Q6H PRN    Or     prochlorperazine  5 mg Oral Q6H PRN    Or     prochlorperazine  12.5 mg Rectal Q12H PRN     senna-docusate  1 tablet Oral BID PRN    Or     senna-docusate  2 tablet Oral BID PRN     sodium chloride (PF)  3 mL Intracatheter Q1H PRN            Data:      Lab data reviewed.     Recent Labs  Lab 08/20/18  0806 08/19/18  1114 08/18/18  1812 08/18/18  1155 08/18/18  0610  08/17/18  1822   HGB 11.9  --   --   --   --   --  12.4   MCV 89  --   --   --   --   --  88     --   --   --   --   --  222   NA  --  132* 127* 127* 128*  < > 124*   POTASSIUM  --  4.8  --   --  4.2  --  4.1   CHLORIDE  --  99  --   --  94  --  89*   CO2  --  28  --   --  26  --  27   BUN  --  17  --   --  21  --  25   CR  --  0.92  --   --  0.94  --  1.16*   ANIONGAP  --  5  --   --  8  --  8   MAKI  --  8.4*  --   --  8.3*  --  9.1   GLC  --  105*  --   --  117*  --  117*   TROPI  --    --   --   --   --   --  <0.015   < > = values in this interval not displayed.        Imaging:      Imaging data reviewed.     Dr. Donte Palma D.O.  [facility]  Pager [phone][ZA1.1]       Revision History        User Key Date/Time User Provider Type Action    > ZA1.2 8/20/2018 11:08 AM Donte Palma DO Physician Sign     ZA1.1 8/20/2018  8:50 AM Donte Palma DO Physician             Progress Notes by Deysi Petty LICSW at 8/19/2018  3:38 PM     Author:  Deysi Petty LICSW Service:  (none) Author Type:      Filed:  8/19/2018  3:42 PM Date of Service:  8/19/2018  3:38 PM Creation Time:  8/19/2018  3:38 PM    Status:  Signed :  Deysi Petty LICSW ()         SW:  D:  Spoke with daughter, Denise, at her request.  Explained [facility] has clinically accepted patient and writer will let the facility know when patient is ready for discharge.  We also discussed that because patient is now under In-patient care she may qualify for SNF benefits depending upon her length of stay in the hospital.    [facility] is accepting patient may be medically stable for discharge before she has had 3 nights of In-patient status.  P:  Will follow up with daughter Denise tomorrow after hospitalist rounds.[KH1.1]     Revision History        User Key Date/Time User Provider Type Action    > KH1.1 8/19/2018  3:42 PM Deysi Petty LICSW  Sign            Progress Notes by Donte Palma DO at 8/19/2018  8:21 AM     Author:  Donte Palma DO Service:  Hospitalist Author Type:  Physician    Filed:  8/19/2018 11:01 AM Date of Service:  8/19/2018  8:21 AM Creation Time:  8/19/2018  8:21 AM    Status:  Signed :  Donte Palma DO (Physician)         [facility]  Hospitalist Progress Note        Donte Palma DO  08/19/2018        Interval History:[ZA1.1]      Patient  states that she has a cough, requesting cough medicine. Updated on plan of care and sodium level.[ZA1.2]          Assessment and Plan:        Delmis Quarles is a 89 year old female who was admitted on 8/17/2018 after presenting with significant wekaness.      Acute on chronic lower extremity weakness: Suspect multifactorial with neuropathy and hyponatremia as well as advanced age.  Patient has had progressive weakness, worsening over the past months, but with acute change day of admission to the point where she could not get out of bed. She has been evaluated by neurology with recent EMGs and follow-up scheduled for early this coming week.  Noted to have hyponatremia, worse than prior, which is likely contributing.  Family has been looking into increased level of care as patient currently resides in a senior living complex. She notes improvement today after receiving IVF and is able to ambulate though requires nursing assistance due to significant unsteadiness. She has no focal weakness on exam.   - PT[ZA1.1] following.[ZA1.2]   -[ZA1.1] F[ZA1.2]ollow up with Neurology as scheduled to review EMG results     Hypochloremic hyponatremia: Serum sodium of 124 on admission.  Suspect contribution from both loop diuretic as well as poor oral intake.  Note that patient has been mildly hyponatremic over the past year as well, remains on Bumex for blood pressure control and lower extremity edema.  - Diet as tolerated  -[ZA1.1] Hold bumex and spironolactone at this time.[ZA1.2]      Bronchitis: Recently prescribed anti-infectives through her primary care provider for possible pneumonia.  Chest x-ray without evidence of pneumonia in outpatient setting, CT of chest with some findings of bronchitis, no pneumonia.    -[ZA1.1] Guaifenesin prn.[ZA1.2]      Neuropathy: Suspect this is also contributing to patient's sensation of weakness.     - [ZA1.1]C[ZA1.2]ontinue prior to admission gabapentin      Vaginal dryness:[ZA1.1]  "Stable.[ZA1.2]   - Continue prior to admission estradiol suppository      History of gastric ulcer, esophageal stricture:[ZA1.1] Stable.[ZA1.2]   -[ZA1.1] C[ZA1.2]ontinue prior to admission omeprazole      Right renal mass: Incidental finding of 1.7 cm indeterminant right renal cyst on CT of chest 18.  Findings discussed with patient as well as daughter and son-in-law at bedside on admission[ZA1.1], per report[ZA1.2].  In the setting of advanced age and desire for no further surgical intervention, at this time they are opting not to pursue further diagnostic imaging.  Aware of finding, and may consider in the future.  This decision making process is consistent with recent decision not to pursue further mammograms.  - Recommend reassessment of decision to pursue diagnostic imaging by primary care physician assistant following discharge     DVT Prophylaxis: Pneumatic Compression Devices    Code: DNR/DNI     Disposition: Expected discharge in 1-2 days to TCU pending[ZA1.1] improved[ZA1.2] sodium     Pain: # Pain Assessment:  Current Pain Score 2018   Patient currently in pain? denies   Pain score (0-10) -   Pain location -   Pain descriptors -   CPOT pain score -[ZA1.1]   Delmis kauffman pain level was assessed and she currently denies pain.[ZA1.2]                     Physical Exam:           Blood pressure 149/69, pulse 77, temperature 98.9  F (37.2  C), temperature source Oral, resp. rate 14, height 1.6 m (5' 2.99\"), weight 69.9 kg (154 lb 3.2 oz), SpO2 97 %, not currently breastfeeding.    Vitals:    18 1806 18 2358   Weight: 65.8 kg (145 lb) 69.9 kg (154 lb 3.2 oz)       Vital Sign Ranges  Temperature Temp  Av  F (36.7  C)  Min: 96.5  F (35.8  C)  Max: 98.9  F (37.2  C)   Blood pressure Systolic (24hrs), Av , Min:113 , Max:149        Diastolic (24hrs), Av, Min:48, Max:69      Pulse Pulse  Av.4  Min: 71  Max: 77   Respirations Resp  Av.3  Min: 14  Max: 18   Pulse oximetry SpO2  " Av.3 %  Min: 96 %  Max: 100 %     Vital Signs with Ranges  Temp:  [96.5  F (35.8  C)-98.9  F (37.2  C)] 98.9  F (37.2  C)  Pulse:  [71-77] 77  Resp:  [14-18] 14  BP: (113-149)/(48-69) 149/69  SpO2:  [96 %-100 %] 97 %    I/O Last 3 Shifts:   I/O last 3 completed shifts:  In: 1893 [P.O.:420; I.V.:1473]  Out: -     I/O past 24 hours:     Intake/Output Summary (Last 24 hours) at 18 0821  Last data filed at 18 0659   Gross per 24 hour   Intake             1893 ml   Output                0 ml   Net             1893 ml     GENERAL: Alert and[ZA1.1] confused[ZA1.2]. NAD. Conversational, appropriate.   HEENT: Normocephalic. EOMI. No icterus or injection. Nares normal.   LUNGS:[ZA1.1] Decrement to bases, non-productive cough[ZA1.2]. No dyspnea at rest.   HEART: Regular rate. Extremities perfused.   ABDOMEN: Soft, nontender, and nondistended. Positive bowel sounds.   EXTREMITIES: LE edema noted.   NEUROLOGIC: Moves extremities x4.[ZA1.1] Follows commands.[ZA1.2]          Prior to Admission Medications:        Prescriptions Prior to Admission   Medication Sig Dispense Refill Last Dose     aspirin 81 MG EC tablet Take 1 tablet (81 mg) by mouth daily (Patient taking differently: Take 81 mg by mouth every evening ) 90 tablet 3 2018 at pm     benazepril (LOTENSIN) 20 MG tablet Take 2 tablets (40 mg) by mouth daily May split dose if desired or take them both at one time. (Patient taking differently: Take 20 mg by mouth 2 times daily May split dose if desired or take them both at one time.) 180 tablet 1 2018 at am x 1 dose     bumetanide (BUMEX) 1 MG tablet TAKE 1/2 TABLET BY MOUTH DAILY 15 tablet 0 2018 at 1200     Calcium Carbonate-Vitamin D (CALCIUM + D) 600-200 MG-UNIT per tablet Take 1 tablet by mouth 2 times daily    2018 at am x 1 dose     estradiol (VAGIFEM) 10 MCG TABS vaginal tablet INSERT 1 TABLET VAGINALLY TWICE WEEKLY (Patient taking differently: INSERT 1 TABLET VAGINALLY TWICE WEEKLY  Tues/Fri) 24 tablet 0 8/14/2018     fexofenadine (ALLEGRA) 180 MG tablet Take 180 mg by mouth daily as needed for allergies   prn med     fluticasone (FLONASE) 50 MCG/ACT spray INSTILL 2 SPRAYS INTO BOTH NOSTRILS ONCE DAILY 16 mL 0 8/17/2018 at am     gabapentin (NEURONTIN) 100 MG capsule 200 mg in the morning and 100 mg at noon  200 mg in the evening 450 capsule 1 8/17/2018 at x 2 doses     latanoprost (XALATAN) 0.005 % ophthalmic solution Place 1 drop into both eyes At Bedtime    8/16/2018 at pm     multivitamin, therapeutic with minerals (MULTI-VITAMIN) TABS tablet Take 1 tablet by mouth daily   8/17/2018 at am     omeprazole (PRILOSEC) 20 MG CR capsule TAKE 1 CAPSULE EVERY MORNING DAILY 90 capsule 0 8/17/2018 at am     spironolactone (ALDACTONE) 25 MG tablet Take 1 tablet (25 mg) by mouth daily (with lunch) 90 tablet 1 8/17/2018 at 1200     timolol (TIMOPTIC) 0.5 % ophthalmic solution Place 1 drop into both eyes daily   8/17/2018 at am     VITAMIN D 1000 UNIT PO CAPS 2 CAPSULE EVERY DAY  0 8/17/2018 at am            Medications:        Current Facility-Administered Medications   Medication Last Rate     sodium chloride 75 mL/hr at 08/19/18 0127     Current Facility-Administered Medications   Medication Dose Route Frequency     aspirin  81 mg Oral QPM     estradiol  10 mcg Vaginal Once per day on Tue Fri     fluticasone  2 spray Both Nostrils Daily     gabapentin  100 mg Oral Daily     gabapentin  200 mg Oral BID     guaiFENesin  600 mg Oral BID     latanoprost  1 drop Both Eyes At Bedtime     lisinopril  20 mg Oral BID     omeprazole  20 mg Oral QAM AC     sodium chloride (PF)  3 mL Intracatheter Q8H     timolol  1 drop Both Eyes Daily     Current Facility-Administered Medications   Medication Dose Route Frequency     acetaminophen  650 mg Rectal Q4H PRN     acetaminophen  650 mg Oral Q4H PRN     albuterol  2.5 mg Nebulization Q2H PRN     bisacodyl  10 mg Rectal Daily PRN     fexofenadine  180 mg Oral Daily PRN      guaiFENesin  10 mL Oral Q4H PRN     lidocaine 4%   Topical Q1H PRN     lidocaine (buffered or not buffered)  1 mL Other Q1H PRN     melatonin  1 mg Oral At Bedtime PRN     naloxone  0.1-0.4 mg Intravenous Q2 Min PRN     ondansetron  4 mg Oral Q6H PRN    Or     ondansetron  4 mg Intravenous Q6H PRN     polyethylene glycol  17 g Oral Daily PRN     prochlorperazine  5 mg Intravenous Q6H PRN    Or     prochlorperazine  5 mg Oral Q6H PRN    Or     prochlorperazine  12.5 mg Rectal Q12H PRN     senna-docusate  1 tablet Oral BID PRN    Or     senna-docusate  2 tablet Oral BID PRN     sodium chloride (PF)  3 mL Intracatheter Q1H PRN            Data:      Lab data reviewed.     Recent Labs  Lab 08/18/18  1812 08/18/18  1155 08/18/18  0610  08/17/18  1822   HGB  --   --   --   --  12.4   MCV  --   --   --   --  88   PLT  --   --   --   --  222   * 127* 128*  < > 124*   POTASSIUM  --   --  4.2  --  4.1   CHLORIDE  --   --  94  --  89*   CO2  --   --  26  --  27   BUN  --   --  21  --  25   CR  --   --  0.94  --  1.16*   ANIONGAP  --   --  8  --  8   MAKI  --   --  8.3*  --  9.1   GLC  --   --  117*  --  117*   TROPI  --   --   --   --  <0.015   < > = values in this interval not displayed.        Imaging:      Imaging data reviewed.     Dr. Donte Palma D.O.  [facility]  Pager [phone][ZA1.1]       Revision History        User Key Date/Time User Provider Type Action    > ZA1.2 8/19/2018 11:01 AM Donte Palma DO Physician Sign     ZA1.1 8/19/2018  8:21 AM Donte Palma DO Physician             Progress Notes by Valdez George RN at 8/18/2018 11:51 PM     Author:  Osobow, Valdez O, RN Service:  Nursing Author Type:  Registered Nurse    Filed:  8/18/2018 11:59 PM Date of Service:  8/18/2018 11:51 PM Creation Time:  8/18/2018 11:51 PM    Status:  Signed :  Valdez George RN (Registered Nurse)         Admission    Patient arrives to room 605-2 via cart from  OB unit.  Care plan note: Pt is A&O x 4, forgetful at times. Calm and cooperative for cares. VSS on RA, denied pain and SOB. Regular diet and tolerating well. Na 127, receiving IVF at 75 cc/hr. Incontinent to bladder. Coccyx area redeemed, non-blanchable, open area noted, applied barrier cream. WOC consult in place. Discharge pending in progress. cotinue to monitor.     Inpatient nursing criteria listed below were met:    PCD's Documented: Yes  Skin issues/needs documented :Yes  Isolation education started/completed No  Patient allergies verified with patient: Yes  Verified completion of [place] Risk Assessment Tool:  Yes  Verified completion of Guardianship screening tool: Yes  Fall Prevention: Care plan updated, Education given and documented Yes  Care Plan initiated: Yes  Home medications documented in 2nd Watchs flowsheet: NA  Patient belongings documented in 2nd Watchs flowsheet: Yes  Reminder note (belongings/ medications) placed in discharge instructions:No  Admission profile/ required documentation complete: No[AO1.1]     Revision History        User Key Date/Time User Provider Type Action    > AO1.1 8/18/2018 11:59 PM Valdez George RN Registered Nurse Sign            Progress Notes by Benjamin Rapp RN at 8/18/2018  4:32 PM     Author:  Benjamin Rapp RN Service:  (none) Author Type:  Registered Nurse    Filed:  8/18/2018  4:36 PM Date of Service:  8/18/2018  4:32 PM Creation Time:  8/18/2018  4:32 PM    Status:  Signed :  Benjamin Rapp RN (Registered Nurse)         Pt status changed to inpatient. Daughter Denise called and updated. Response: Thank you. Pt will be transferring to unit 66.[BN1.1]     Revision History        User Key Date/Time User Provider Type Action    > BN1.1 8/18/2018  4:36 PM Benjamin Rapp RN Registered Nurse Sign            Progress Notes by Maricel Serna at 8/18/2018  2:40 PM     Author:  Maricel Serna Service:  Social Work Author Type:      Filed:   8/18/2018  4:25 PM Date of Service:  8/18/2018  2:40 PM Creation Time:  8/18/2018  2:40 PM    Status:  Addendum :  Maricel Serna ()         Care Transition Initial Assessment - SW  Reason For Consult: discharge planning  Met with: PATIENT,FAMILY    Active Problems:    Hyponatremia       DATA  Lives With: alone  Living Arrangements: independent living facility  Description of Support System: Supportive, Involved  Who is your support system?: Children  Support Assessment: Adequate family and caregiver support, Adequate social supports.   Identified issues/concerns regarding health management: Need for increased services at time of discharge.     Transportation Available: family or friend will provide    ASSESSMENT  Cognitive Status:  Awake, alert, oriented  Concerns to be addressed: Discharge planning    SW reviewed chart and met with patient to discuss discharge planning.  Patient was admitted into Observation 8/17/18 with Hyponatremia.  Anticipated discharge date: 8/19/18.  SW introduced self and role.  Present in room with patient was her daughter, Denise and Denise's spouse.  Patient confirmed she resides in an Independent apartment at [facility].  Denise confirmed patient had not been receiving any services while living there, but patient has become weaker and they have been in the process of securing an Assisted Living apartment at [facility].  Per Denise, the[CS1.1] [place][CS1.2] apartment patient is set to move into[CS1.1] will be ready 9/1.[CS1.2]  We discussed therapies recommendation for TCU.  SW reviewed with patient and family the Medicare requirement for payment in a TCU.  As patient is in Observation and will not meet in patient criteria, patient will be private pay in a TCU[CS1.1] which generally requires a $5,000 down payment ($350-$450 per day)[CS1.2].  Daughter appeared to understand this and agrees with moving patient to [facility] TCU if they have a  bed;[CS1.1] d[CS1.2]ryan also agreed on [facility], if [facility] is full.  Patient agrees with this plan.  SW left VM for [facility] admissions and also sent referrals thru [facility] for both[CS1.1] [facility][CS1.2] [facility] and [facility]. Daughter stated she will transport patient at time of discharge.  SW will need to call daughter with any updates once placement is found.  Cell phone for daughter: [phone].[CS1.1]    UPDATE@1501:  SW received call from [facility], admissions at [facility], who stated she had spoken with patient's PA-C earlier this week and was aware of this possible TCU need for patient placement.  [facility] will review information and update SW with a final decision, as they do have open female beds in their TCU.  Per [facility], if patient is accepted, they will require a $5,000 down payment.[CS1.2]      PLAN  Financial costs for the patient includes:  Private pay for TCU .  Patient given options and choices for discharge:  Yes .  Patient/family is agreeable to the plan?  YES  Patient Goals and Preferences: Discharge to TCU .  Patient anticipates discharging to:  TCU .      Continue to assist as needed to ensure a safe discharge.    ROB Akbar[CS1.1]      UPDATE@1604:  SW received VM from Lupe, admissions at [facility], who stated they have clinically and financially accepted patient for tomorrow.  SW will need to follow up with her tomorrow () to finalize the discharge.  EDWIN will also need to update patient, daughter[CS1.3] (confirming the $5,000 deposit)[CS1.4] and [facility] staff.[CS1.3]       UPDATE@1621: SW received update patient has met criteria for inpatient and will be discharging to another floor.  EDWIN updated [facility], admissions at [facility], as discharge date now is unknown.  Family will be updated with change in status by staff RN.  EDWIN will still need to contact daughter tomorrow to discuss anticipated discharge date and if private pay status will be affected.[CS1.5]        Revision  History        User Key Date/Time User Provider Type Action    > CS1.5 8/18/2018  4:25 PM Stojessica Maricel  Addend     CS1.4 8/18/2018  4:08 PM StoMena lopezyl  Addend     CS1.3 8/18/2018  4:07 PM StoMena lopezyl  Addend     CS1.2 8/18/2018  3:04 PM StojessicaMenayl  Addend     CS1.1 8/18/2018  2:53 PM Maricel Serna  Sign            Progress Notes by Jeimy Turner PT at 8/18/2018 12:34 PM     Author:  Jeimy Turner PT Service:  (none) Author Type:  Physical Therapist    Filed:  8/18/2018 12:36 PM Date of Service:  8/18/2018 12:34 PM Creation Time:  8/18/2018 12:34 PM    Status:  Signed :  Jeimy Turner PT (Physical Therapist)          08/18/18 1200   Quick Adds   Type of Visit Initial PT Evaluation   Living Environment   Lives With alone   Living Arrangements independent living facility   Home Accessibility no concerns   Number of Stairs to Enter Home 0   Number of Stairs Within Home 0   Transportation Available family or friend will provide   Living Environment Comment senior apartment, services available but pt has not needed any services previously    Self-Care   Dominant Hand right   Usual Activity Tolerance moderate   Current Activity Tolerance poor   Regular Exercise yes   Activity/Exercise/Self-Care Comment Pt does have OPPT   Functional Level Prior   Ambulation 1-->assistive equipment  (4WW)   Transferring 1-->assistive equipment   Toileting 0-->independent   Bathing 2-->assistive person  (daughter assist)   Dressing 0-->independent   Eating 0-->independent   Communication 0-->understands/communicates without difficulty   Swallowing 0-->swallows foods/liquids without difficulty   Cognition 0 - no cognition issues reported   Fall history within last six months yes   Number of times patient has fallen within last six months 2   General Information   Onset of Illness/Injury or Date of Surgery - Date 08/17/18   Referring  "Physician Pat Felipe PA-C   Patient/Family Goals Statement \"Go to rehab at [facility]\"   Pertinent History of Current Problem (include personal factors and/or comorbidities that impact the POC) 89 YOF admitted with progressive B LE weakness and difficulty walking. Dx'd with hyponatremia with admit sodium 124, now 128. Pt has seen OP neurology for the weakness. PMH: neuropathy, bronchitis, R renal mass.   Precautions/Limitations fall precautions   Weight-Bearing Status - LUE full weight-bearing   Weight-Bearing Status - RUE full weight-bearing   Weight-Bearing Status - LLE full weight-bearing   Weight-Bearing Status - RLE full weight-bearing   General Observations Pleasant and cooperative   General Info Comments Activity: ambulate with assist   Cognitive Status Examination   Orientation orientation to person, place and time   Level of Consciousness alert   Follows Commands and Answers Questions 75% of the time;able to follow single-step instructions   Personal Safety and Judgment intact   Pain Assessment   Patient Currently in Pain No   Posture    Posture Forward head position;Protracted shoulders   Range of Motion (ROM)   ROM Comment BLEs WFL   Strength   Strength Comments B hips 3+/5, knee ext 4-/5, ankle 4/5   Bed Mobility   Bed Mobility Comments Sit>supine with Manjit to lift LEs into bed and reposition   Transfer Skills   Transfer Comments Sit>stand from a chair to FWW with modA to lift her hips off the chair and correct posterior lean   Gait   Gait Comments Gait with FWW x 5' with modA for walker management and correcting posterior lean, 3 point pattern, decreased step length B, very unsteady   Balance   Balance Comments Good sitting, requires UE support and modA for static standing   Sensory Examination   Sensory Perception Comments neuropathy in B feet   Coordination   Coordination no deficits were identified   Clinical Impression   Criteria for Skilled Therapeutic Intervention evaluation only " "  Clinical Presentation Stable/Uncomplicated   Clinical Presentation Rationale see above. ModA for mobility   Clinical Decision Making (Complexity) Low complexity   Therapy Frequency` other (see comments)  (eval only)   Predicted Duration of Therapy Intervention (days/wks) 1 day   Anticipated Discharge Disposition Transitional Care Facility   Risk & Benefits of therapy have been explained Yes   Patient, Family & other staff in agreement with plan of care Yes   [facility] AM-PAC TM \"6 Clicks\"   2016, Trustees of [facility], under license to [employer].  All rights reserved.   6 Clicks Short Forms Basic Mobility Inpatient Short Form   [facility] AM-PAC  \"6 Clicks\" V.2 Basic Mobility Inpatient Short Form   1. Turning from your back to your side while in a flat bed without using bedrails? 3 - A Little   2. Moving from lying on your back to sitting on the side of a flat bed without using bedrails? 3 - A Little   3. Moving to and from a bed to a chair (including a wheelchair)? 2 - A Lot   4. Standing up from a chair using your arms (e.g., wheelchair, or bedside chair)? 2 - A Lot   5. To walk in hospital room? 2 - A Lot   6. Climbing 3-5 steps with a railing? 1 - Total   Basic Mobility Raw Score (Score out of 24.Lower scores equate to lower levels of function) 13   Total Evaluation Time   Total Evaluation Time (Minutes) 15[HV1.1]        Revision History        User Key Date/Time User Provider Type Action    > HV1.1 8/18/2018 12:36 PM Jeimy Turner PT Physical Therapist Sign                  Procedure Notes     No notes of this type exist for this encounter.         Progress Notes - Therapies (Notes from 08/18/18 through 08/21/18)      Progress Notes by Jeimy Turner PT at 8/18/2018 12:34 PM     Author:  Jeimy Turner PT Service:  (none) Author Type:  Physical Therapist    Filed:  8/18/2018 12:36 PM Date of Service:  8/18/2018 12:34 PM Creation Time:  8/18/2018 12:34 PM    Status:  " "Signed :  Jeimy Turner, PT (Physical Therapist)          08/18/18 1200   Quick Adds   Type of Visit Initial PT Evaluation   Living Environment   Lives With alone   Living Arrangements independent living facility   Home Accessibility no concerns   Number of Stairs to Enter Home 0   Number of Stairs Within Home 0   Transportation Available family or friend will provide   Living Environment Comment senior apartment, services available but pt has not needed any services previously    Self-Care   Dominant Hand right   Usual Activity Tolerance moderate   Current Activity Tolerance poor   Regular Exercise yes   Activity/Exercise/Self-Care Comment Pt does have OPPT   Functional Level Prior   Ambulation 1-->assistive equipment  (4WW)   Transferring 1-->assistive equipment   Toileting 0-->independent   Bathing 2-->assistive person  (daughter assist)   Dressing 0-->independent   Eating 0-->independent   Communication 0-->understands/communicates without difficulty   Swallowing 0-->swallows foods/liquids without difficulty   Cognition 0 - no cognition issues reported   Fall history within last six months yes   Number of times patient has fallen within last six months 2   General Information   Onset of Illness/Injury or Date of Surgery - Date 08/17/18   Referring Physician Pat Felipe PA-C   Patient/Family Goals Statement \"Go to rehab at [facility]\"   Pertinent History of Current Problem (include personal factors and/or comorbidities that impact the POC) 89 YOF admitted with progressive B LE weakness and difficulty walking. Dx'd with hyponatremia with admit sodium 124, now 128. Pt has seen OP neurology for the weakness. PMH: neuropathy, bronchitis, R renal mass.   Precautions/Limitations fall precautions   Weight-Bearing Status - LUE full weight-bearing   Weight-Bearing Status - RUE full weight-bearing   Weight-Bearing Status - LLE full weight-bearing   Weight-Bearing Status - RLE full weight-bearing   General " "Observations Pleasant and cooperative   General Info Comments Activity: ambulate with assist   Cognitive Status Examination   Orientation orientation to person, place and time   Level of Consciousness alert   Follows Commands and Answers Questions 75% of the time;able to follow single-step instructions   Personal Safety and Judgment intact   Pain Assessment   Patient Currently in Pain No   Posture    Posture Forward head position;Protracted shoulders   Range of Motion (ROM)   ROM Comment BLEs WFL   Strength   Strength Comments B hips 3+/5, knee ext 4-/5, ankle 4/5   Bed Mobility   Bed Mobility Comments Sit>supine with Manjit to lift LEs into bed and reposition   Transfer Skills   Transfer Comments Sit>stand from a chair to FWW with modA to lift her hips off the chair and correct posterior lean   Gait   Gait Comments Gait with FWW x 5' with modA for walker management and correcting posterior lean, 3 point pattern, decreased step length B, very unsteady   Balance   Balance Comments Good sitting, requires UE support and modA for static standing   Sensory Examination   Sensory Perception Comments neuropathy in B feet   Coordination   Coordination no deficits were identified   Clinical Impression   Criteria for Skilled Therapeutic Intervention evaluation only   Clinical Presentation Stable/Uncomplicated   Clinical Presentation Rationale see above. ModA for mobility   Clinical Decision Making (Complexity) Low complexity   Therapy Frequency` other (see comments)  (eval only)   Predicted Duration of Therapy Intervention (days/wks) 1 day   Anticipated Discharge Disposition Transitional Care Facility   Risk & Benefits of therapy have been explained Yes   Patient, Family & other staff in agreement with plan of care Yes   [facility] AM-PAC TM \"6 Clicks\"   2016, Trustees of [facility], under license to [employer].  All rights reserved.   6 Clicks Short Forms Basic Mobility Inpatient Short Form   [facility] " "AM-PAC  \"6 Clicks\" V.2 Basic Mobility Inpatient Short Form   1. Turning from your back to your side while in a flat bed without using bedrails? 3 - A Little   2. Moving from lying on your back to sitting on the side of a flat bed without using bedrails? 3 - A Little   3. Moving to and from a bed to a chair (including a wheelchair)? 2 - A Lot   4. Standing up from a chair using your arms (e.g., wheelchair, or bedside chair)? 2 - A Lot   5. To walk in hospital room? 2 - A Lot   6. Climbing 3-5 steps with a railing? 1 - Total   Basic Mobility Raw Score (Score out of 24.Lower scores equate to lower levels of function) 13   Total Evaluation Time   Total Evaluation Time (Minutes) 15[HV1.1]        Revision History        User Key Date/Time User Provider Type Action    > HV1.1 8/18/2018 12:36 PM Jeimy Turner, PT Physical Therapist Sign            "

## 2018-08-17 NOTE — IP AVS SNAPSHOT
"          [place] [address] [facility]: [phone]                                              INTERAGENCY TRANSFER FORM - LAB / IMAGING / EKG / EMG RESULTS   2018                    Hospital Admission Date: 2018  LIDIA ZHANG   : 1924  Sex: Female        Attending Provider: Donte Palma DO     Allergies:  Trospium, Codeine, Penicillins, Sulfa Drugs    Infection:  None   Service:  HOSPITALIST    Ht:  1.6 m (5' 2.99\")   Wt:  71.5 kg (157 lb 10.1 oz)   Admission Wt:  65.8 kg (145 lb)    BMI:  27.93 kg/m 2   BSA:  1.78 m 2            Patient PCP Information     Provider PCP Type    Sherry Monteiro PA-C General         Lab Results - 3 Days      Basic metabolic panel [[id number]] (Abnormal)  Resulted: 18 0905, Result status: Final result    Ordering provider: Donte Palma DO  18 0000 Resulting lab: [facility]    Specimen Information    Type Source Collected On   Blood  18 0828          Components       Value Reference Range Flag Lab   Sodium 135 133 - 144 mmol/L  FrStHsLb   Potassium 4.2 3.4 - 5.3 mmol/L  FrStHsLb   Chloride 101 94 - 109 mmol/L  FrStHsLb   Carbon Dioxide 26 20 - 32 mmol/L  FrStHsLb   Anion Gap 8 3 - 14 mmol/L  FrStHsLb   Glucose 118 70 - 99 mg/dL H FrStHsLb   Urea Nitrogen 16 7 - 30 mg/dL  FrStHsLb   Creatinine 0.75 0.52 - 1.04 mg/dL  FrStHsLb   GFR Estimate 72 >60 mL/min/1.7m2  FrStHsLb   Comment:  Non  GFR Calc   GFR Estimate If Black 87 >60 mL/min/1.7m2  FrStHsLb   Comment:  African American GFR Calc   Calcium 9.3 8.5 - 10.1 mg/dL  FrStHsLb            CBC with platelets [[id number]]  Resulted: 18 0828, Result status: Final result    Ordering provider: Donte Palma DO  18 0000 Resulting lab: [facility]    Specimen Information    Type Source Collected On   Blood  18 0806          Components       Value Reference Range Flag Lab   WBC 7.2 4.0 - 11.0 " 10e9/L  FrStHsLb   RBC Count 3.93 3.8 - 5.2 10e12/L  FrStHsLb   Hemoglobin 11.9 11.7 - 15.7 g/dL  FrStHsLb   Hematocrit 35.0 35.0 - 47.0 %  FrStHsLb   MCV 89 78 - 100 fl  FrStHsLb   MCH 30.3 26.5 - 33.0 pg  FrStHsLb   MCHC 34.0 31.5 - 36.5 g/dL  FrStHsLb   RDW 14.2 10.0 - 15.0 %  FrStHsLb   Platelet Count 248 150 - 450 10e9/L  FrStHsLb            Basic metabolic panel [[id number]] (Abnormal)  Resulted: 08/19/18 1153, Result status: Final result    Ordering provider: Pat Felipe PA-C  08/19/18 0000 Resulting lab: [facility]    Specimen Information    Type Source Collected On   Blood  08/19/18 1114          Components       Value Reference Range Flag Lab   Sodium 132 133 - 144 mmol/L L FrStHsLb   Potassium 4.8 3.4 - 5.3 mmol/L  FrStHsLb   Chloride 99 94 - 109 mmol/L  FrStHsLb   Carbon Dioxide 28 20 - 32 mmol/L  FrStHsLb   Anion Gap 5 3 - 14 mmol/L  FrStHsLb   Glucose 105 70 - 99 mg/dL H FrStHsLb   Urea Nitrogen 17 7 - 30 mg/dL  FrStHsLb   Creatinine 0.92 0.52 - 1.04 mg/dL  FrStHsLb   GFR Estimate 57 >60 mL/min/1.7m2 L FrStHsLb   Comment:  Non  GFR Calc   GFR Estimate If Black 69 >60 mL/min/1.7m2  FrStHsLb   Comment:  African American GFR Calc   Calcium 8.4 8.5 - 10.1 mg/dL L FrStHsLb            Sodium [[id number]] (Abnormal)  Resulted: 08/18/18 1833, Result status: Final result    Ordering provider: Pat Felipe PA-C  08/18/18 1727 Resulting lab: [facility]    Specimen Information    Type Source Collected On   Blood  08/18/18 1812          Components       Value Reference Range Flag Lab   Sodium 127 133 - 144 mmol/L L FrStHsLb            Sodium [[id number]] (Abnormal)  Resulted: 08/18/18 1225, Result status: Final result    Ordering provider: Wes Vega MD  08/18/18 0600 Resulting lab: [facility]    Specimen Information    Type Source Collected On   Blood  08/18/18 1155          Components       Value Reference Range Flag Lab    Sodium 127 133 - 144 mmol/L L FrStHsLb            Basic metabolic panel [[id number]] (Abnormal)  Resulted: 08/18/18 0701, Result status: Final result    Ordering provider: Wes Vega MD  08/18/18 0001 Resulting lab: [facility]    Specimen Information    Type Source Collected On   Blood  08/18/18 0610          Components       Value Reference Range Flag Lab   Sodium 128 133 - 144 mmol/L L FrStHsLb   Potassium 4.2 3.4 - 5.3 mmol/L  FrStHsLb   Chloride 94 94 - 109 mmol/L  FrStHsLb   Carbon Dioxide 26 20 - 32 mmol/L  FrStHsLb   Anion Gap 8 3 - 14 mmol/L  FrStHsLb   Glucose 117 70 - 99 mg/dL H FrStHsLb   Urea Nitrogen 21 7 - 30 mg/dL  FrStHsLb   Creatinine 0.94 0.52 - 1.04 mg/dL  FrStHsLb   GFR Estimate 56 >60 mL/min/1.7m2 L FrStHsLb   Comment:  Non  GFR Calc   GFR Estimate If Black 67 >60 mL/min/1.7m2  FrStHsLb   Comment:  African American GFR Calc   Calcium 8.3 8.5 - 10.1 mg/dL L FrStHsLb            Sodium [[id number]] (Abnormal)  Resulted: 08/18/18 0106, Result status: Final result    Ordering provider: Wes Vega MD  08/17/18 2349 Resulting lab: [facility]    Specimen Information    Type Source Collected On   Blood  08/18/18 0050          Components       Value Reference Range Flag Lab   Sodium 128 133 - 144 mmol/L L FrStHsLb            Testing Performed By     Lab - Abbreviation Name Director Address Valid Date Range    14 - FrStHsLb [facility] Unknown [address] [place] [address] 05/08/15 1057 - Present            Unresulted Labs     None      Encounter-Level Documents:     There are no encounter-level documents.      Order-Level Documents:     There are no order-level documents.

## 2018-08-17 NOTE — IP AVS SNAPSHOT
"` `           [place] [address] [facility]: [phone]                                              INTERAGENCY TRANSFER FORM - NURSING   2018                    Hospital Admission Date: 2018  LIDIA ZHANG   : 1924  Sex: Female        Attending Provider: Donte Palma DO     Allergies:  Trospium, Codeine, Penicillins, Sulfa Drugs    Infection:  None   Service:  HOSPITALIST    Ht:  1.6 m (5' 2.99\")   Wt:  71.5 kg (157 lb 10.1 oz)   Admission Wt:  65.8 kg (145 lb)    BMI:  27.93 kg/m 2   BSA:  1.78 m 2            Patient PCP Information     Provider PCP Type    Sherry Monteiro PA-C General      Current Code Status     Date Active Code Status Order ID Comments User Context       Prior      Code Status History     Date Active Date Inactive Code Status Order ID Comments User Context    2018  9:20 AM  DNR/DNI [id number]  Donte Palma DO Outpatient    2018 11:49 PM 2018  9:20 AM DNR/DNI [id number]  Wes Vega MD Inpatient    2014  8:30 AM 2018 11:49 PM DNR [id number] POLST RECEIVED 14 PATRICIA Jackson Sherry, LPN Outpatient    2014  7:42 AM 2014  8:30 AM Full Code [id number]  Shin Lester MD Outpatient    5/10/2014  8:19 PM 2014  7:42 AM Full Code [id number]  Chemo Scott MD Inpatient    2014  6:39 PM 5/10/2014  8:19 PM Full Code [id number]  Chester Sharif MD Inpatient    2012  4:54 PM 2014  6:39 PM Full Code [id number]  Hal Bates PA-C Outpatient    1/3/2012  5:41 PM 2012  4:54 PM Full Code [id number]  Massiel Woodall RN Inpatient      Advance Directives        Scanned docmt in ACP Activity?           Yes, scanned ACP docmt        Hospital Problems as of 2018              Priority Class Noted POA    Hyponatremia Medium  2018 Yes    Generalized weakness Medium  2018 Yes      Non-Hospital Problems as of 2018              Priority Class Noted    " Osteoporosis Medium  6/9/2003    Diffuse cystic mastopathy Medium  6/9/2003    Stricture and stenosis of esophagus Medium  9/20/2004    Embolism and thrombosis (H) Medium  9/20/2004    Other lymphedema Medium  3/16/2005    Chronic gastric ulcer Medium  5/5/2006    CARDIOVASCULAR SCREENING; LDL GOAL LESS THAN 130 Medium  2/10/2010    Cervicalgia Medium  5/20/2010    Hypertension goal BP (blood pressure) < 140/90 Medium  6/8/2010    Median nerve injury Medium  5/30/2013    Pain in joint, upper arm Medium  5/30/2013    Health Care Home Medium  9/8/2014    S/P laminectomy with spinal fusion Medium  10/6/2014    Osteoarthritis of left glenohumeral joint Medium  10/29/2015      Immunizations     Name Date      Influenza (H1N1) 12/22/09     Influenza (High Dose) 3 valent vaccine 10/15/15     Influenza (High Dose) 3 valent vaccine 09/15/14     Influenza (High Dose) 3 valent vaccine 09/19/13     Influenza (IIV3) PF 09/15/11     Influenza (IIV3) PF 09/01/10     Pneumo Conj 13-V (2010&after) 03/19/15     Pneumococcal 23 valent 10/13/08     Pneumococcal 23 valent 10/01/98     TD (ADULT, 7+) 03/20/09     TD (ADULT, 7+) 05/15/98     Zoster vaccine, live 03/20/09          END      ASSESSMENT     Discharge Profile Flowsheet     EXPECTED DISCHARGE     Resources List  Assisted Living 11/17/17 0959    Expected Discharge Date  08/20/18 (TCU) 08/19/18 1242   SKIN      DISCHARGE NEEDS ASSESSMENT     Inspection of bony prominences  Full 08/21/18 0856    Transportation Available  family or friend will provide 08/18/18 1227   Inspection under devices  Full 08/21/18 0856    # of Referrals Placed by CTS  Post Acute Facilities 08/19/18 1542   Skin WDL  ex 08/21/18 0856    Equipment Used at Home  bath bench;grab bar 05/12/14 1121   Additional Documentation  Pressure Ulcer (LDA) 08/18/18 1203    GASTROINTESTINAL (ADULT,PEDIATRIC,OB)     Skin Temperature  warm 08/21/18 0856    GI WDL  WDL 08/21/18 0856   Skin Moisture  dry 08/21/18 0856    Last  "Bowel Movement  08/18/18 08/21/18 0856   Skin Elasticity  quick return to original state 08/21/18 0856    Passing flatus  yes 08/21/18 0856   Skin Integrity  bruise(s);scab(s);scar(s);pressure ulcer(s);cracked;rash(es) (Rash on inner thigh/perineal area) 08/21/18 0856    COMMUNICATION ASSESSMENT     SAFETY      Patient's communication style  spoken language (English or Bilingual) 08/18/18 0002   Safety WDL  WDL 08/21/18 0856    FINAL RESOURCES     All Alarms  alarm(s) activated and audible 08/21/18 0856                 Assessment WDL (Within Defined Limits) Definitions           Safety WDL     Effective: 09/28/15    Row Information: <b>WDL Definition:</b> Bed in low position, wheels locked; call light in reach; upper side rails up x 2; ID band on<br> <font color=\"gray\"><i>Item=AS safety wdl>>List=AS safety wdl>>Version=F14</i></font>      Skin WDL     Effective: 09/28/15    Row Information: <b>WDL Definition:</b> Warm; dry; intact; elastic; without discoloration; pressure points without redness<br> <font color=\"gray\"><i>Item=AS skin wdl>>List=AS skin wdl>>Version=F14</i></font>      Vitals     Vital Signs Flowsheet     VITAL SIGNS     Side Effects Monitoring: Respiratory Quality  R 08/18/18 1710    Temp  98  F (36.7  C) 08/21/18 0734   Side Effects Monitoring: Respiratory Depth  N 08/18/18 1710    Temp src  Oral 08/21/18 0734   Side Effects Monitoring: Sedation Level  1 08/18/18 1710    Resp  16 08/21/18 0734   HEIGHT AND WEIGHT      Pulse  76 08/21/18 0734   Height  1.6 m (5' 2.99\") 08/18/18 0000    Pulse/Heart Rate Source  Monitor 08/21/18 0734   Weight  71.5 kg (157 lb 10.1 oz) 08/21/18 0613    BP  139/69 08/21/18 0734   Weight Method  Bed scale (2 addtional pillows) 08/21/18 0613    BP Location  Left arm 08/21/18 0734   Bed Scale  Standard (fitted sheet, draw sheet/ pad, cover/flat sheet, blanket, two pillows);Pillow (add comment for number);Call light 08/21/18 0613    Pain Score  NO PAIN (0) 08/21/18 0112 "   BSA (Calculated - sq m)  1.76 08/18/18 0000    OXYGEN THERAPY     BMI (Calculated)  27.38 08/18/18 0000    SpO2  96 % 08/21/18 0734   DAILY CARE      O2 Device  None (Room air) 08/21/18 0734   Activity Management  activity adjusted per tolerance 08/21/18 0856    PAIN/COMFORT     Activity Assistance Provided  assistance, 2 people 08/21/18 0856    Patient Currently in Pain  denies 08/21/18 0846   POSITIONING      Preferred Pain Scale  number (Numeric Rating Pain Scale) 08/18/18 1828   Body Position  independently positioning;legs elevated 08/21/18 1051    Patient's Stated Pain Goal  No pain 08/18/18 1828   Head of Bed (HOB)  HOB at 20-30 degrees 08/21/18 1051    0-10 Pain Scale  0 08/20/18 1617   Chair  Upright in chair 08/21/18 0734    ANALGESIA SIDE EFFECTS MONITORING     Positioning/Transfer Devices  pillows;in use 08/21/18 1051            Patient Lines/Drains/Airways Status    Active LINES/DRAINS/AIRWAYS     Name: Placement date: Placement time: Site: Days: Last dressing change:    Peripheral IV 08/20/18 Right Lower forearm 08/20/18   0347   Lower forearm   1     Pressure Injury 08/18/18 Right Buttocks reddened and open  08/18/18   0226    3     Incision/Surgical Site 01/03/12 Right Knee 01/03/12   1555    2421     Incision/Surgical Site 04/23/14 Bilateral Foot 04/23/14   0816    1581     Incision/Surgical Site 05/10/14 Posterior;Midline Lumbar spine 05/10/14   1006    1564     Incision/Surgical Site 05/10/14 Midline;Anterior Abdomen 05/10/14   1351    1563             Patient Lines/Drains/Airways Status    Active PICC/CVC     None            Intake/Output Detail Report     Date Intake     Output    Shift P.O. I.V. IV Piggyback Total Total       Noc 08/19/18 2300 - 08/20/18 0659 240 -- -- 240 -- 240    Day 08/20/18 0700 - 08/20/18 1459 -- -- -- -- -- 0    Meron 08/20/18 1500 - 08/20/18 2259 820 -- -- 820 -- 820    Noc 08/20/18 2300 - 08/21/18 0659 -- -- -- -- -- 0    Day 08/21/18 0700 - 08/21/18 1459 997 -- -- 240  -- 240      Last Void/BM       Most Recent Value    Urine Occurrence 1 at 08/21/2018 0846    Stool Occurrence 0 at 08/20/2018 0800      Case Management/Discharge Planning     Case Management/Discharge Planning Flowsheet     REFERRAL INFORMATION     Major Change/Loss/Stressor  none 08/19/18 0828    Did the Initial Social Work Assessment result in a Social Work Case?  Yes 08/18/18 1439   EXPECTED DISCHARGE      Admission Type  observation 08/18/18 1439   Expected Discharge Date  08/20/18 (TCU) 08/19/18 1242    Arrived From  home or self-care 08/18/18 1439   DISCHARGE PLANNING      Referral Source  physician 08/18/18 1439   Transportation Available  family or friend will provide 08/18/18 1227    # of Referrals Placed by CTS  Post Acute Facilities 08/19/18 1542   Skilled Nursing [facility] 01/05/12 1204    Reason For Consult  discharge planning 08/18/18 1439   Skilled Nursing Facility Phone Number  [phone] 01/05/12 1204    Record Reviewed  medical record;patient profile 08/18/18 1439   Equipment Used at Home  bath bench;grab bar 05/12/14 1121    CTS Assigned to Case  nick richardson 08/19/18 1542   FINAL RESOURCES      LIVING ENVIRONMENT     Resources List  Assisted Living 11/17/17 0959    Lives With  alone 08/18/18 1439   ABUSE RISK SCREEN      Living Arrangements  independent living facility 08/18/18 1439   QUESTION TO PATIENT:  Has a member of your family or a partner(now or in the past) intimidated, hurt, manipulated, or controlled you in any way?  no 08/17/18 1807    Provides Primary Care For  no one 08/18/18 1439   QUESTION TO PATIENT: Do you feel safe going back to the place where you are living?  yes 08/17/18 1807    ASSESSMENT OF FAMILY/SOCIAL SUPPORT     OBSERVATION: Is there reason to believe there has been maltreatment of a vulnerable adult (ie. Physical/Sexual/Emotional abuse, self neglect, lack of adequate food, shelter, medical care, or financial exploitation)?  no 08/17/18 1807     Marital Status   08/18/18 1439   OTHER      Who is your support system?  Children 08/18/18 1439   Are you depressed or being treated for depression?  No 08/18/18 0003    Description of Support System  Supportive;Involved 08/18/18 1439   HOMICIDE RISK      Support Assessment  Adequate family and caregiver support;Adequate social supports 08/18/18 1439   Feels Like Hurting Others  no 08/17/18 1807    COPING/STRESS

## 2018-08-17 NOTE — ED NOTES
Bed: ED28  Expected date:   Expected time:   Means of arrival:   Comments:  HE- 89 F weakness eta 1800

## 2018-08-17 NOTE — IP AVS SNAPSHOT
MRN:[id number]                      After Visit Summary   8/17/2018    Delmis Quarles    MRN: [id number]           Thank you!     Thank you for choosing [facility] for your care. Our goal is always to provide you with excellent care. Hearing back from our patients is one way we can continue to improve our services. Please take a few minutes to complete the written survey that you may receive in the mail after you visit with us. Thank you!        Patient Information     Date Of Birth          9/5/1924        Designated Caregiver       Most Recent Value    Caregiver    Will someone help with your care after discharge? yes    Name of designated caregiver Denise Becerril    Phone number of caregiver [phone]    Caregiver address same      About your hospital stay     You were admitted on:  August 17, 2018 You last received care in the:  [place] [address] [facility]    You were discharged on:  August 21, 2018        Reason for your hospital stay       Low sodium level                  Who to Call     For medical emergencies, please call 911.  For non-urgent questions about your medical care, please call your primary care provider or clinic, [phone]          Attending Provider     Provider Specialty    Murtaza Boykin MD Emergency Medicine    Vega, Wes Hauser MD Internal Medicine    Donte Palma DO Internal Medicine       Primary Care Provider Office Phone # Fax #    Sherry Monteiro PA-C [phone]      After Care Instructions     Activity - Up with nursing assistance           Advance Diet as Tolerated       Follow this diet upon discharge: Orders Placed This Encounter      Fluid restriction 1200 ML FLUID      Regular Diet Adult            Fall precautions           General info for SNF       Length of Stay Estimate: Short Term Care: Estimated # of Days <30  Condition at Discharge: Improving  Level of care:skilled   Rehabilitation Potential:  "Excellent  Admission H&P remains valid and up-to-date: Yes  Recent Chemotherapy: N/A  Use Nursing Home Standing Orders: Yes            Mantoux instructions       Give two-step Mantoux (PPD) Per Facility Policy Yes                  Follow-up Appointments     Follow Up and recommended labs and tests       Follow up with correction physician.  The following labs/tests are recommended: cbc/bmp.  Follow up with primary care provider.                  Additional Services     Occupational Therapy Adult Consult       Evaluate and treat as clinically indicated.    Reason: Physical deconditioning.            Physical Therapy Adult Consult       Evaluate and treat as clinically indicated.    Reason:  Physical deconditioning.                  Further instructions from your care team       Discharge to [facility]  [phone].    Pending Results     No orders found from 8/15/2018 to 8/18/2018.            Statement of Approval     Ordered          08/21/18 1028  I have reviewed and agree with all the recommendations and orders detailed in this document.  EFFECTIVE NOW     Approved and electronically signed by:  Donte Palma DO             Admission Information     Date & Time Provider Department Dept. Phone    8/17/2018 Donte Palma DO [place] [address] [facility] [phone]      Your Vitals Were     Blood Pressure Pulse Temperature Respirations Height Weight    139/69 (BP Location: Left arm) 76 98  F (36.7  C) (Oral) 16 1.6 m (5' 2.99\") 71.5 kg (157 lb 10.1 oz)    Pulse Oximetry BMI (Body Mass Index)                96% 27.93 kg/m2          [email or web address] Information     [employer] lets you send messages to your doctor, view your test results, renew your prescriptions, schedule appointments and more. To sign up, go to [email or web address] . Click on \"Log in\" on the left side of the screen, which will take you to the Welcome page. Then click on \"Sign up Now\" on the right side " of the page.     You will be asked to enter the access code listed below, as well as some personal information. Please follow the directions to create your username and password.     Your access code is: [email or web address]  Expires: 2018 11:22 AM     Your access code will  in 90 days. If you need help or a new code, please call your [facility] clinic or [phone].        Care EveryWhere ID     This is your Care EveryWhere ID. This could be used by other organizations to access your [facility] medical records  [id number]        Equal Access to Services     [facility]: Hadii dana Henry, wairaj wise, dipesh costa, margareth rhodes . So [facility] [phone].    ATENCIÓN: Si habla español, tiene a mcdaniels disposición servicios gratuitos de asistencia lingüística. Llame al [phone].    We comply with applicable federal civil rights laws and [place] laws. We do not discriminate on the basis of race, color, national origin, age, disability, sex, sexual orientation, or gender identity.               Review of your medicines      START taking        Dose / Directions    acetaminophen 325 MG tablet   Commonly known as:  TYLENOL   Used for:  Cough        Dose:  650 mg   Take 2 tablets (650 mg) by mouth every 4 hours as needed for mild pain   Quantity:  30 tablet   Refills:  0       furosemide 40 MG tablet   Commonly known as:  LASIX   Used for:  Cough        Dose:  40 mg   Take 1 tablet (40 mg) by mouth daily   Quantity:  180 tablet   Refills:  1       guaiFENesin-dextromethorphan 100-10 MG/5ML syrup   Commonly known as:  ROBITUSSIN DM   Used for:  Cough        Dose:  5 mL   Take 5 mLs by mouth every 4 hours as needed for cough   Quantity:  560 mL   Refills:  0       lisinopril 20 MG tablet   Commonly known as:  PRINIVIL/ZESTRIL   Used for:  Cough   Replaces:  benazepril 20 MG tablet        Dose:  20 mg   Take 1 tablet (20 mg) by mouth 2 times daily   Quantity:  30  tablet   Refills:  0         CONTINUE these medicines which may have CHANGED, or have new prescriptions. If we are uncertain of the size of tablets/capsules you have at home, strength may be listed as something that might have changed.        Dose / Directions    aspirin 81 MG EC tablet   This may have changed:  when to take this   Used for:  Hypertension        Dose:  81 mg   Take 1 tablet (81 mg) by mouth daily   Quantity:  90 tablet   Refills:  3       estradiol 10 MCG Tabs vaginal tablet   Commonly known as:  VAGIFEM   This may have changed:  See the new instructions.   Used for:  Atrophic vaginitis        INSERT 1 TABLET VAGINALLY TWICE WEEKLY   Quantity:  24 tablet   Refills:  0         CONTINUE these medicines which have NOT CHANGED        Dose / Directions    calcium + D 600-200 MG-UNIT Tabs   Generic drug:  calcium carbonate-vitamin D        Dose:  1 tablet   Take 1 tablet by mouth 2 times daily   Refills:  0       fexofenadine 180 MG tablet   Commonly known as:  ALLEGRA        Dose:  180 mg   Take 180 mg by mouth daily as needed for allergies   Refills:  0       fluticasone 50 MCG/ACT spray   Commonly known as:  FLONASE   Used for:  Post-nasal drainage        INSTILL 2 SPRAYS INTO BOTH NOSTRILS ONCE DAILY   Quantity:  16 mL   Refills:  0       gabapentin 100 MG capsule   Commonly known as:  NEURONTIN   Used for:  Neuropathy        200 mg in the morning and 100 mg at noon  200 mg in the evening   Quantity:  450 capsule   Refills:  1       latanoprost 0.005 % ophthalmic solution   Commonly known as:  XALATAN        Dose:  1 drop   Place 1 drop into both eyes At Bedtime   Refills:  0       Multi-vitamin Tabs tablet        Dose:  1 tablet   Take 1 tablet by mouth daily   Refills:  0       omeprazole 20 MG CR capsule   Commonly known as:  priLOSEC   Used for:  Stricture and stenosis of esophagus        TAKE 1 CAPSULE EVERY MORNING DAILY   Quantity:  90 capsule   Refills:  0       timolol 0.5 % ophthalmic  solution   Commonly known as:  TIMOPTIC        Dose:  1 drop   Place 1 drop into both eyes daily   Refills:  0       vitamin D 1000 units capsule        2 CAPSULE EVERY DAY   Refills:  0         STOP taking     benazepril 20 MG tablet   Commonly known as:  LOTENSIN   Replaced by:  lisinopril 20 MG tablet           bumetanide 1 MG tablet   Commonly known as:  BUMEX           spironolactone 25 MG tablet   Commonly known as:  ALDACTONE                Where to get your medicines      These medications were sent to [facility] - [address]  [address], [place] [address]     Phone:  [phone]     acetaminophen 325 MG tablet    furosemide 40 MG tablet    guaiFENesin-dextromethorphan 100-10 MG/5ML syrup    lisinopril 20 MG tablet                Protect others around you: Learn how to safely use, store and throw away your medicines at www.disposemymeds.org.             Medication List: This is a list of all your medications and when to take them. Check marks below indicate your daily home schedule. Keep this list as a reference.      Medications           Morning Afternoon Evening Bedtime As Needed    acetaminophen 325 MG tablet   Commonly known as:  TYLENOL   Take 2 tablets (650 mg) by mouth every 4 hours as needed for mild pain                                aspirin 81 MG EC tablet   Take 1 tablet (81 mg) by mouth daily   Last time this was given:  81 mg on 8/20/2018  9:10 PM                                calcium + D 600-200 MG-UNIT Tabs   Take 1 tablet by mouth 2 times daily   Generic drug:  calcium carbonate-vitamin D                                estradiol 10 MCG Tabs vaginal tablet   Commonly known as:  VAGIFEM   INSERT 1 TABLET VAGINALLY TWICE WEEKLY   Last time this was given:  10 mcg on 8/18/2018 12:14 AM                                fexofenadine 180 MG tablet   Commonly known as:  ALLEGRA   Take 180 mg by mouth daily as needed for allergies                                 fluticasone 50 MCG/ACT spray   Commonly known as:  FLONASE   INSTILL 2 SPRAYS INTO BOTH NOSTRILS ONCE DAILY   Last time this was given:  2 sprays on 8/21/2018  8:43 AM                                furosemide 40 MG tablet   Commonly known as:  LASIX   Take 1 tablet (40 mg) by mouth daily                                gabapentin 100 MG capsule   Commonly known as:  NEURONTIN   200 mg in the morning and 100 mg at noon  200 mg in the evening   Last time this was given:  100 mg on 8/21/2018 11:22 AM                                guaiFENesin-dextromethorphan 100-10 MG/5ML syrup   Commonly known as:  ROBITUSSIN DM   Take 5 mLs by mouth every 4 hours as needed for cough   Last time this was given:  5 mLs on 8/19/2018  1:03 PM                                latanoprost 0.005 % ophthalmic solution   Commonly known as:  XALATAN   Place 1 drop into both eyes At Bedtime   Last time this was given:  1 drop on 8/20/2018  9:09 PM                                lisinopril 20 MG tablet   Commonly known as:  PRINIVIL/ZESTRIL   Take 1 tablet (20 mg) by mouth 2 times daily   Last time this was given:  20 mg on 8/21/2018  8:43 AM                                Multi-vitamin Tabs tablet   Take 1 tablet by mouth daily                                omeprazole 20 MG CR capsule   Commonly known as:  priLOSEC   TAKE 1 CAPSULE EVERY MORNING DAILY   Last time this was given:  20 mg on 8/21/2018  6:43 AM                                timolol 0.5 % ophthalmic solution   Commonly known as:  TIMOPTIC   Place 1 drop into both eyes daily   Last time this was given:  1 drop on 8/21/2018  8:43 AM                                vitamin D 1000 units capsule   2 CAPSULE EVERY DAY

## 2018-08-17 NOTE — IP AVS SNAPSHOT
[place] [address] [facility]    [address] [place] [address]    Phone:  [phone]                                       After Visit Summary   8/17/2018    Delmis Quarles    MRN: [id number]           After Visit Summary Signature Page     I have received my discharge instructions, and my questions have been answered. I have discussed any challenges I see with this plan with the nurse or doctor.    ..........................................................................................................................................  Patient/Patient Representative Signature      ..........................................................................................................................................  Patient Representative Print Name and Relationship to Patient    ..................................................               ................................................  Date                                            Time    ..........................................................................................................................................  Reviewed by Signature/Title    ...................................................              ..............................................  Date                                                            Time

## 2018-08-18 ENCOUNTER — APPOINTMENT (OUTPATIENT)
Dept: PHYSICAL THERAPY | Facility: CLINIC | Age: 83
DRG: 074 | End: 2018-08-18
Attending: PHYSICIAN ASSISTANT
Payer: MEDICARE

## 2018-08-18 PROBLEM — R53.1 GENERALIZED WEAKNESS: Status: ACTIVE | Noted: 2018-08-18

## 2018-08-18 LAB
ANION GAP SERPL CALCULATED.3IONS-SCNC: 8 MMOL/L (ref 3–14)
BUN SERPL-MCNC: 21 MG/DL (ref 7–30)
CALCIUM SERPL-MCNC: 8.3 MG/DL (ref 8.5–10.1)
CHLORIDE SERPL-SCNC: 94 MMOL/L (ref 94–109)
CO2 SERPL-SCNC: 26 MMOL/L (ref 20–32)
CREAT SERPL-MCNC: 0.94 MG/DL (ref 0.52–1.04)
GFR SERPL CREATININE-BSD FRML MDRD: 56 ML/MIN/1.7M2
GLUCOSE SERPL-MCNC: 117 MG/DL (ref 70–99)
POTASSIUM SERPL-SCNC: 4.2 MMOL/L (ref 3.4–5.3)
SODIUM SERPL-SCNC: 127 MMOL/L (ref 133–144)
SODIUM SERPL-SCNC: 127 MMOL/L (ref 133–144)
SODIUM SERPL-SCNC: 128 MMOL/L (ref 133–144)
SODIUM SERPL-SCNC: 128 MMOL/L (ref 133–144)

## 2018-08-18 PROCEDURE — A9270 NON-COVERED ITEM OR SERVICE: HCPCS | Mod: GY | Performed by: INTERNAL MEDICINE

## 2018-08-18 PROCEDURE — 12000000 ZZH R&B MED SURG/OB

## 2018-08-18 PROCEDURE — A9270 NON-COVERED ITEM OR SERVICE: HCPCS | Mod: GY | Performed by: PHYSICIAN ASSISTANT

## 2018-08-18 PROCEDURE — 40000275 ZZH STATISTIC RCP TIME EA 10 MIN

## 2018-08-18 PROCEDURE — 80048 BASIC METABOLIC PNL TOTAL CA: CPT | Performed by: INTERNAL MEDICINE

## 2018-08-18 PROCEDURE — 25000128 H RX IP 250 OP 636: Performed by: PHYSICIAN ASSISTANT

## 2018-08-18 PROCEDURE — 84295 ASSAY OF SERUM SODIUM: CPT | Performed by: INTERNAL MEDICINE

## 2018-08-18 PROCEDURE — 25000125 ZZHC RX 250: Performed by: INTERNAL MEDICINE

## 2018-08-18 PROCEDURE — 25000128 H RX IP 250 OP 636: Performed by: INTERNAL MEDICINE

## 2018-08-18 PROCEDURE — G0378 HOSPITAL OBSERVATION PER HR: HCPCS

## 2018-08-18 PROCEDURE — 99233 SBSQ HOSP IP/OBS HIGH 50: CPT | Performed by: PHYSICIAN ASSISTANT

## 2018-08-18 PROCEDURE — 36415 COLL VENOUS BLD VENIPUNCTURE: CPT | Performed by: PHYSICIAN ASSISTANT

## 2018-08-18 PROCEDURE — 84295 ASSAY OF SERUM SODIUM: CPT | Performed by: PHYSICIAN ASSISTANT

## 2018-08-18 PROCEDURE — 97161 PT EVAL LOW COMPLEX 20 MIN: CPT | Mod: GP

## 2018-08-18 PROCEDURE — 40000193 ZZH STATISTIC PT WARD VISIT

## 2018-08-18 PROCEDURE — 36415 COLL VENOUS BLD VENIPUNCTURE: CPT | Performed by: INTERNAL MEDICINE

## 2018-08-18 PROCEDURE — 25000125 ZZHC RX 250: Performed by: PHYSICIAN ASSISTANT

## 2018-08-18 PROCEDURE — 94640 AIRWAY INHALATION TREATMENT: CPT

## 2018-08-18 PROCEDURE — 96361 HYDRATE IV INFUSION ADD-ON: CPT

## 2018-08-18 PROCEDURE — 25000132 ZZH RX MED GY IP 250 OP 250 PS 637: Mod: GY | Performed by: PHYSICIAN ASSISTANT

## 2018-08-18 PROCEDURE — 25000132 ZZH RX MED GY IP 250 OP 250 PS 637: Mod: GY | Performed by: INTERNAL MEDICINE

## 2018-08-18 RX ORDER — GABAPENTIN 100 MG/1
200 CAPSULE ORAL 2 TIMES DAILY
Status: DISCONTINUED | OUTPATIENT
Start: 2018-08-18 | End: 2018-08-21 | Stop reason: HOSPADM

## 2018-08-18 RX ORDER — SODIUM CHLORIDE 9 MG/ML
INJECTION, SOLUTION INTRAVENOUS CONTINUOUS
Status: DISCONTINUED | OUTPATIENT
Start: 2018-08-18 | End: 2018-08-19

## 2018-08-18 RX ORDER — SODIUM CHLORIDE 9 MG/ML
INJECTION, SOLUTION INTRAVENOUS CONTINUOUS
Status: DISCONTINUED | OUTPATIENT
Start: 2018-08-18 | End: 2018-08-18

## 2018-08-18 RX ORDER — LATANOPROST 50 UG/ML
1 SOLUTION/ DROPS OPHTHALMIC AT BEDTIME
Status: DISCONTINUED | OUTPATIENT
Start: 2018-08-18 | End: 2018-08-21 | Stop reason: HOSPADM

## 2018-08-18 RX ORDER — GUAIFENESIN 600 MG/1
600 TABLET, EXTENDED RELEASE ORAL 2 TIMES DAILY
Status: DISCONTINUED | OUTPATIENT
Start: 2018-08-18 | End: 2018-08-19

## 2018-08-18 RX ORDER — ALBUTEROL SULFATE 0.83 MG/ML
2.5 SOLUTION RESPIRATORY (INHALATION)
Status: DISCONTINUED | OUTPATIENT
Start: 2018-08-18 | End: 2018-08-21 | Stop reason: HOSPADM

## 2018-08-18 RX ADMIN — SODIUM CHLORIDE: 9 INJECTION, SOLUTION INTRAVENOUS at 14:07

## 2018-08-18 RX ADMIN — GUAIFENESIN 600 MG: 600 TABLET, EXTENDED RELEASE ORAL at 13:13

## 2018-08-18 RX ADMIN — GABAPENTIN 200 MG: 100 CAPSULE ORAL at 00:34

## 2018-08-18 RX ADMIN — GABAPENTIN 100 MG: 100 CAPSULE ORAL at 13:13

## 2018-08-18 RX ADMIN — ASPIRIN 81 MG: 81 TABLET, COATED ORAL at 00:34

## 2018-08-18 RX ADMIN — ESTRADIOL 10 MCG: 10 INSERT VAGINAL at 00:14

## 2018-08-18 RX ADMIN — LATANOPROST 1 DROP: 50 SOLUTION OPHTHALMIC at 00:39

## 2018-08-18 RX ADMIN — LISINOPRIL 20 MG: 20 TABLET ORAL at 08:02

## 2018-08-18 RX ADMIN — TIMOLOL MALEATE 1 DROP: 5 SOLUTION OPHTHALMIC at 08:07

## 2018-08-18 RX ADMIN — GUAIFENESIN 600 MG: 600 TABLET, EXTENDED RELEASE ORAL at 19:05

## 2018-08-18 RX ADMIN — ASPIRIN 81 MG: 81 TABLET, COATED ORAL at 19:07

## 2018-08-18 RX ADMIN — GABAPENTIN 200 MG: 100 CAPSULE ORAL at 08:03

## 2018-08-18 RX ADMIN — OMEPRAZOLE 20 MG: 20 CAPSULE, DELAYED RELEASE ORAL at 08:03

## 2018-08-18 RX ADMIN — LISINOPRIL 20 MG: 20 TABLET ORAL at 19:05

## 2018-08-18 RX ADMIN — Medication 1 MG: at 00:34

## 2018-08-18 RX ADMIN — ALBUTEROL SULFATE 2.5 MG: 2.5 SOLUTION RESPIRATORY (INHALATION) at 12:52

## 2018-08-18 RX ADMIN — GUAIFENESIN 10 ML: 200 SOLUTION ORAL at 05:32

## 2018-08-18 RX ADMIN — SODIUM CHLORIDE 500 ML: 9 INJECTION, SOLUTION INTRAVENOUS at 00:30

## 2018-08-18 RX ADMIN — GABAPENTIN 200 MG: 100 CAPSULE ORAL at 19:05

## 2018-08-18 RX ADMIN — LISINOPRIL 20 MG: 20 TABLET ORAL at 00:34

## 2018-08-18 RX ADMIN — FLUTICASONE PROPIONATE 2 SPRAY: 50 SPRAY, METERED NASAL at 08:06

## 2018-08-18 RX ADMIN — LATANOPROST 1 DROP: 50 SOLUTION/ DROPS OPHTHALMIC at 23:14

## 2018-08-18 ASSESSMENT — ACTIVITIES OF DAILY LIVING (ADL)
ADLS_ACUITY_SCORE: 15
WHICH_OF_THE_ABOVE_FUNCTIONAL_RISKS_HAD_A_RECENT_ONSET_OR_CHANGE?: AMBULATION;TRANSFERRING
ADLS_ACUITY_SCORE: 16

## 2018-08-18 NOTE — PROGRESS NOTES
Care Transition Initial Assessment - EDWIN  Reason For Consult: discharge planning  Met with: PATIENT,FAMILY    Active Problems:    Hyponatremia       DATA  Lives With: alone  Living Arrangements: independent living facility  Description of Support System: Supportive, Involved  Who is your support system?: Children  Support Assessment: Adequate family and caregiver support, Adequate social supports.   Identified issues/concerns regarding health management: Need for increased services at time of discharge.     Transportation Available: family or friend will provide    ASSESSMENT  Cognitive Status:  Awake, alert, oriented  Concerns to be addressed: Discharge planning    SW reviewed chart and met with patient to discuss discharge planning.  Patient was admitted into Observation 8/17/18 with Hyponatremia.  Anticipated discharge date: 8/19/18.  SW introduced self and role.  Present in room with patient was her daughter, Denise and Denise's spouse.  Patient confirmed she resides in an Independent apartment at [facility].  Denise confirmed patient had not been receiving any services while living there, but patient has become weaker and they have been in the process of securing an Assisted Living apartment at [facility].  Per Denise, the [facility] apartment patient is set to move into will be ready 9/1.  We discussed therapies recommendation for TCU.  SW reviewed with patient and family the Medicare requirement for payment in a TCU.  As patient is in Observation and will not meet in patient criteria, patient will be private pay in a TCU which generally requires a $5,000 down payment ($350-$450 per day).  Daughter appeared to understand this and [facility]; daughter also agreed on [facility], if [facility] is full.  Patient agrees with this plan.  SW left  for [facility] admissions and also sent referrals thru [facility] for both [facility] and [facility].  Daughter stated she will transport patient at time of discharge.  EDWIN will need to call daughter with any updates once placement is found.  Cell phone for daughter: [phone].    UPDATE@1505:  SW received call from Lupe, admissions at [facility], who stated she had spoken with patient's PA-C earlier this week and was aware of this possible TCU need for patient placement.  Lupe will review information and update SW with a final decision, as they do have open female beds in their TCU.  Per [facility], if patient is accepted, they will require a $5,000 down payment.      PLAN  Financial costs for the patient includes:  Private pay for TCU .  Patient given options and choices for discharge:  Yes .  Patient/family is agreeable to the plan?  YES  Patient Goals and Preferences: Discharge to TCU .  Patient anticipates discharging to:  TCU .      Continue to assist as needed to ensure a safe discharge.    ROB Akbar      UPDATE@1604:  SW received VM from Lupe, admissions at [facility], who stated they have clinically and financially accepted patient for tomorrow.  EDWIN will need to follow up with her tomorrow () to finalize the discharge.  EDWIN will also need to update patient, daughter (confirming the $5,000 deposit) and [facility] staff.       UPDATE@1621: EDWIN received update patient has met criteria for inpatient and will be discharging to another floor.  EDWIN updated [facility], admissions at [facility], as discharge date now is unknown.  Family will be updated with change in status by staff RN.  EDWIN will still need to contact daughter tomorrow to discuss anticipated discharge date and if private pay status will be affected.

## 2018-08-18 NOTE — PLAN OF CARE
"Problem: Patient Care Overview  Goal: Plan of Care/Patient Progress Review  PT: PT orders received, initial eval completed. Patient lives alone in a senior living facility. Previously independent with all ADLs except bathing (daughter assist) and mobility using a 4WW. She has been participating with OPPT. Pt was admitted under OBS status with progressive BLE weakness which has now made ambulating difficult. Diagnosed with hyponatremia (124 on admit, 128 today). She has seen OP neurology for the LE weakness with EMG tests pending.    Discharge Planner PT   Patient plan for discharge: \"Go to rehab at [facility].\"  Current status: Pt requires Manjit for bed mobility; modA for sit<>stand and gait x 5' with a FWW. Pt is limited by BLE LE weakness and has a signifincant posterior lean with all standing tasks that she is unable to correct without assist.  Barriers to return to prior living situation: lives alone, high fall risk, limited ambulation, level of assist required  Recommendations for discharge: TCU  Rationale for recommendations: Pt would benefit from PT at TCU to progress strength, balance and mobility so pt can return home safely. Will sign off and defer further PT services to next level of care.       Entered by: Jeimy Turner 08/18/2018 12:39 PM           "

## 2018-08-18 NOTE — PROGRESS NOTES
RECEIVING UNIT ED HANDOFF REVIEW    ED Nurse Handoff Report was reviewed by: Dee Dee Headley on August 17, 2018 at 10:50 PM

## 2018-08-18 NOTE — PHARMACY-ADMISSION MEDICATION HISTORY
Admission medication history interview status for the 8/17/2018  admission is complete. See EPIC admission navigator for prior to admission medications     Medication history source reliability:Good    Actions taken by pharmacist (provider contacted, etc):Verified all medications with patient's daughter Denise over the phone     Additional medication history information not noted on PTA med list :None    Medication reconciliation/reorder completed by provider prior to medication history? No    Time spent in this activity: 20 minutes    Prior to Admission medications    Medication Sig Last Dose Taking? Auth Provider   aspirin 81 MG EC tablet Take 1 tablet (81 mg) by mouth daily  Patient taking differently: Take 81 mg by mouth every evening  8/16/2018 at pm Yes Rhonda Aj MD   benazepril (LOTENSIN) 20 MG tablet Take 2 tablets (40 mg) by mouth daily May split dose if desired or take them both at one time.  Patient taking differently: Take 20 mg by mouth 2 times daily May split dose if desired or take them both at one time. 8/17/2018 at am x 1 dose Yes Rhonda Aj MD   bumetanide (BUMEX) 1 MG tablet TAKE 1/2 TABLET BY MOUTH DAILY 8/17/2018 at 1200 Yes Rhonda Aj MD   Calcium Carbonate-Vitamin D (CALCIUM + D) 600-200 MG-UNIT per tablet Take 1 tablet by mouth 2 times daily  8/17/2018 at am x 1 dose Yes Reported, Patient   estradiol (VAGIFEM) 10 MCG TABS vaginal tablet INSERT 1 TABLET VAGINALLY TWICE WEEKLY  Patient taking differently: INSERT 1 TABLET VAGINALLY TWICE WEEKLY Tues/Fri 8/14/2018 Yes Rhonda Aj MD   fexofenadine (ALLEGRA) 180 MG tablet Take 180 mg by mouth daily as needed for allergies prn med Yes Unknown, Entered By History   fluticasone (FLONASE) 50 MCG/ACT spray INSTILL 2 SPRAYS INTO BOTH NOSTRILS ONCE DAILY 8/17/2018 at am Yes Rhonda Aj MD   gabapentin (NEURONTIN) 100 MG capsule 200 mg in the morning and 100 mg at noon  200 mg in the evening 8/17/2018  at x 2 doses Yes Rhonda Aj MD   latanoprost (XALATAN) 0.005 % ophthalmic solution Place 1 drop into both eyes At Bedtime  8/16/2018 at pm Yes Reported, Patient   multivitamin, therapeutic with minerals (MULTI-VITAMIN) TABS tablet Take 1 tablet by mouth daily 8/17/2018 at am Yes Unknown, Entered By History   omeprazole (PRILOSEC) 20 MG CR capsule TAKE 1 CAPSULE EVERY MORNING DAILY 8/17/2018 at am Yes Rhonda Aj MD   spironolactone (ALDACTONE) 25 MG tablet Take 1 tablet (25 mg) by mouth daily (with lunch) 8/17/2018 at 1200 Yes Rhonda Aj MD   timolol (TIMOPTIC) 0.5 % ophthalmic solution Place 1 drop into both eyes daily 8/17/2018 at am Yes Travis Calixto MD   VITAMIN D 1000 UNIT PO CAPS 2 CAPSULE EVERY DAY 8/17/2018 at am Yes

## 2018-08-18 NOTE — PROGRESS NOTES
[facility]    Hospitalist Progress Note      Assessment & Plan   Delmis Quarles is a 89 year old female who was admitted on 8/17/2018 after presenting with significant wekaness.     Acute on chronic lower extremity weakness: Suspect multifactorial with neuropathy and hyponatremia as well as advanced age.  Patient has had progressive weakness, worsening over the past months, but with acute change day of admission to the point where she could not get out of bed. She has been evaluated by neurology with recent EMGs and follow-up scheduled for early this coming week.  Noted to have hyponatremia, worse than prior, which is likely contributing.  Family has been looking into increased level of care as patient currently resides in a senior living complex. She notes improvement today after receiving IVF and is able to ambulate though requires nursing assistance due to significant unsteadiness. She has no focal weakness on exam.   --PT evaluated patient, TCU recommended. Referrals sent  -Ambulate with nursing staff 4 times daily  --recommend follow up with Neurology later this week as scheduled to review EMG results, will attempt to get results faxed over      Hypochloremic hyponatremia: Serum sodium of 124 on admission.  Suspect contribution from both loop diuretic as well as poor oral intake.  Note that patient has been mildly hyponatremic over the past year as well, remains on Bumex for blood pressure control and lower extremity edema.  -Diet as tolerated  -Both Bumex and spironolactone have been discontinued at this time. can consider reinitiation of spironolactone if needed for blood pressure support and swelling  -Received 500 mL normal saline in the emergency department with improvement in serum sodium by 2 mEq. -sodium improved after addition of 1L IVF to 128, now 127 on recheck.  --resume NS at low rate, recheck this evening to ensure she is not correcting too quickly   -Monitor intake and  output    Bronchitis: Recently prescribed anti-infectives through her primary care provider for possible pneumonia.  Chest x-ray without evidence of pneumonia in outpatient setting, CT of chest with some findings of bronchitis, no pneumonia.  Appears to have been precipitated by a recent upper respiratory illness over the past week, nasal congestion symptoms have since resolved.  -Continue prior to admission Allegra, Flonase  -No anti-infectives currently prescribed.  Monitor for fevers or hypoxia.  -- add Mucinex  --prn robitussin for cough  --add albuterol nebs prn- patient has trouble using inhalers    Neuropathy: Suspect this is also contributing to patient's sensation of weakness.    Strength intact in extremities.   - continue prior to admission gabapentin  -Request [facility] clinic of neurology records re: recent EMG testing     Vaginal dryness:  -Continue prior to admission estradiol suppository     History of gastric ulcer, esophageal stricture:  -Continue prior to admission omeprazole     Right renal mass: Incidental finding of 1.7 cm indeterminant right renal cyst on CT of chest 8/17/18.  Findings discussed with patient as well as daughter and son-in-law at bedside on admission.  In the setting of advanced age and desire for no further surgical intervention, at this time they are opting not to pursue further diagnostic imaging.  Aware of finding, and may consider in the future.  This decision making process is consistent with recent decision not to pursue further mammograms.  -Recommend reassessment of decision to pursue diagnostic imaging by primary care physician assistant following discharge in the coming months.    DVT Prophylaxis: Pneumatic Compression Devices  Code Status: DNR/DNI    Disposition: Expected discharge in 1-2 days to TCU pending stable sodium     Pat Felipe PA-C    Interval History   Patient reports weakness is improved today but she still feels quite unsteady. Not back to baseline  per family. Legs feel much less heavy. She denies any pain. No chest pain, dyspnea, nausea, vomiting, visual changes. Tolerating po    -Data reviewed today: I reviewed all new labs and imaging results over the last 24 hours. I personally reviewed no images or EKG's today.    Physical Exam   Temp: 96.5  F (35.8  C) Temp src: Oral BP: 129/48 Pulse: 74   Resp: 16 SpO2: 97 % O2 Device: None (Room air)    Vitals:    08/17/18 1806 08/17/18 2358   Weight: 65.8 kg (145 lb) 69.9 kg (154 lb 3.2 oz)     Vital Signs with Ranges  Temp:  [95.9  F (35.5  C)-98.8  F (37.1  C)] 96.5  F (35.8  C)  Pulse:  [71-88] 74  Resp:  [16-18] 16  BP: (116-175)/(48-77) 129/48  SpO2:  [96 %-99 %] 97 %  I/O last 3 completed shifts:  In: 220 [P.O.:220]  Out: -     Constitutional: Alert and oriented x4, appears comfortable and is appropriately conversant   ENT: normal cephalic, moist mucous membranes  Eyes:  EOMI, anicteric   Respiratory: Lungs with mild expiratory wheezing in bases, coarse sounds cleared with cough. Congested cough. No increased work of breath.   Cardiovascular: Regular rate and rhythm, no murmur, 1+ bilateral LE edema, distal pulses +2/4  GI: active bowel sounds, abdomen soft, non-tender to palpation   Skin/Integumen: warm, dry  MSK:  Moves all four extremities. Hip flexion +4/5 and equal bilaterally. Dorsi and plantar flexion +5/5. stregnth in upper extremities +5/5 and equal bilaterally.   Neuro:  Cranial nerves 2-12 grossly intact. No focal deficits. Finger to nose testing intact.       Medications     sodium chloride 100 mL/hr at 08/18/18 1407       aspirin  81 mg Oral QPM     estradiol  10 mcg Vaginal Once per day on Tue Fri     fluticasone  2 spray Both Nostrils Daily     gabapentin  100 mg Oral Daily     gabapentin  200 mg Oral BID     guaiFENesin  600 mg Oral BID     latanoprost  1 drop Both Eyes At Bedtime     lisinopril  20 mg Oral BID     omeprazole  20 mg Oral QAM AC     sodium chloride (PF)  3 mL Intracatheter Q8H      timolol  1 drop Both Eyes Daily       Data     Recent Labs  Lab 08/18/18  1155 08/18/18  0610 08/18/18  0050  08/17/18  1822   WBC  --   --   --   --  8.7   HGB  --   --   --   --  12.4   MCV  --   --   --   --  88   PLT  --   --   --   --  222   * 128* 128*  < > 124*   POTASSIUM  --  4.2  --   --  4.1   CHLORIDE  --  94  --   --  89*   CO2  --  26  --   --  27   BUN  --  21  --   --  25   CR  --  0.94  --   --  1.16*   ANIONGAP  --  8  --   --  8   MAKI  --  8.3*  --   --  9.1   GLC  --  117*  --   --  117*   TROPI  --   --   --   --  <0.015   < > = values in this interval not displayed.    Recent Results (from the past 24 hour(s))   CT Chest Pulmonary Embolism w Contrast    Narrative    CT CHEST PULMONARY EMBOLISM WITH CONTRAST 8/17/2018 8:10 PM     HISTORY: Weakness, cough for one week. Negative chest x-ray. Concern  for pneumonia versus pulmonary embolism.      TECHNIQUE: Volumetric acquisition of the chest  after the  administration of 73ml Isovue-370 IV contrast. Radiation dose for this  scan was reduced using automated exposure control, adjustment of the  mA and/or kV according to patient size, or iterative reconstruction  technique.     COMPARISON: 10/18/2005 and 10/16/2006    FINDINGS: No visualized pulmonary embolism. Normal heart size.  Coronary artery calcifications. Mild atheromatous changes of the  thoracic aorta. Two tiny left upper lung nodules are stable and  considered benign. Mild bronchial wall thickening in the left lower  lung with some mucoid impaction and minimal bronchiectasis. No  consolidative infiltrates, pleural effusions or other acute findings.  No enlarged mediastinal or hilar lymph nodes.    Two right renal cysts measuring up to 3.4 cm. Partially visualized 1.7  cm lesion at the lateral aspect of the right mid kidney could be a  normal lobulation or an indeterminant lesion either a hemorrhagic cyst  or possibly a small solid lesion. This could be better evaluated with  follow-up  outpatient CT or MRI, if clinically indicated.      Impression    IMPRESSION:  1. Slight bronchial wall thickening with minimal bronchiectasis and  some mucoid impaction in the left lower lung.  2. No visualized pulmonary was more other acute findings in the chest.  3. Possible 1.7 cm right renal lesion partially visualized. This is  indeterminate. Possibilities include some normal cortical lobulation  versus a hemorrhagic cyst or possibly a small solid lesion. If  clinically indicated, follow-up outpatient contrast-enhanced CT or MRI  could be performed.    SEBASTIAN DAVIS MD

## 2018-08-18 NOTE — PLAN OF CARE
Problem: Patient Care Overview  Goal: Discharge Needs Assessment  Outcome: No Change  Weakness, bronchitis, hyponatremia  Patient is A & O x3 with forgetfulness.  Has occasional congested cough and requested cough medicene as she hasnt been able to sleep lately.  Lungs have a few crackles.  Denies pain.  Incontinent of bladder jass with coughing.  Up with A/1-2.  Na 128 presently.  Patient now sleeping.  Observation goals PRIOR TO DISCHARGE     Comments: -diagnostic tests and consults completed and resulted; needs lab tests   -vital signs normal or at patient baseline ; VSS  -tolerating oral intake to maintain hydration; wnl   -safe disposition plan has been identified; social service to address

## 2018-08-18 NOTE — PLAN OF CARE
Problem: Patient Care Overview  Goal: Discharge Needs Assessment  Outcome: No Change  Weakness, bronchitis, hyponatremia  Patient is A & O x3 with forgetfulness.  Has occasional congested cough and given robitussin.  Lungs have a few crackles.  Denies pain.  Incontinent of bladder jass with coughing.  Up with A/2/belt/walker.  Na 128 presently. Patient was able to pull self up in bed with bed at 30 degrees.  She then stood with A/1 but kept leaning backwards.    Observation goals PRIOR TO DISCHARGE     Comments: -diagnostic tests and consults completed and resulted; needs lab tests   -vital signs normal or at patient baseline ; VSS  -tolerating oral intake to maintain hydration; wnl   -safe disposition plan has been identified; social service to address

## 2018-08-18 NOTE — PROGRESS NOTES
" 08/18/18 1200   Quick Adds   Type of Visit Initial PT Evaluation   Living Environment   Lives With alone   Living Arrangements independent living facility   Home Accessibility no concerns   Number of Stairs to Enter Home 0   Number of Stairs Within Home 0   Transportation Available family or friend will provide   Living Environment Comment senior apartment, services available but pt has not needed any services previously    Self-Care   Dominant Hand right   Usual Activity Tolerance moderate   Current Activity Tolerance poor   Regular Exercise yes   Activity/Exercise/Self-Care Comment Pt does have OPPT   Functional Level Prior   Ambulation 1-->assistive equipment  (4WW)   Transferring 1-->assistive equipment   Toileting 0-->independent   Bathing 2-->assistive person  (daughter assist)   Dressing 0-->independent   Eating 0-->independent   Communication 0-->understands/communicates without difficulty   Swallowing 0-->swallows foods/liquids without difficulty   Cognition 0 - no cognition issues reported   Fall history within last six months yes   Number of times patient has fallen within last six months 2   General Information   Onset of Illness/Injury or Date of Surgery - Date 08/17/18   Referring Physician Pat Felipe PA-C   Patient/Family Goals Statement \"Go to rehab at [facility]\"   Pertinent History of Current Problem (include personal factors and/or comorbidities that impact the POC) 89 YOF admitted with progressive B LE weakness and difficulty walking. Dx'd with hyponatremia with admit sodium 124, now 128. Pt has seen OP neurology for the weakness. PMH: neuropathy, bronchitis, R renal mass.   Precautions/Limitations fall precautions   Weight-Bearing Status - LUE full weight-bearing   Weight-Bearing Status - RUE full weight-bearing   Weight-Bearing Status - LLE full weight-bearing   Weight-Bearing Status - RLE full weight-bearing   General Observations Pleasant and cooperative   General Info Comments " "Activity: ambulate with assist   Cognitive Status Examination   Orientation orientation to person, place and time   Level of Consciousness alert   Follows Commands and Answers Questions 75% of the time;able to follow single-step instructions   Personal Safety and Judgment intact   Pain Assessment   Patient Currently in Pain No   Posture    Posture Forward head position;Protracted shoulders   Range of Motion (ROM)   ROM Comment BLEs WFL   Strength   Strength Comments B hips 3+/5, knee ext 4-/5, ankle 4/5   Bed Mobility   Bed Mobility Comments Sit>supine with Manjit to lift LEs into bed and reposition   Transfer Skills   Transfer Comments Sit>stand from a chair to FWW with modA to lift her hips off the chair and correct posterior lean   Gait   Gait Comments Gait with FWW x 5' with modA for walker management and correcting posterior lean, 3 point pattern, decreased step length B, very unsteady   Balance   Balance Comments Good sitting, requires UE support and modA for static standing   Sensory Examination   Sensory Perception Comments neuropathy in B feet   Coordination   Coordination no deficits were identified   Clinical Impression   Criteria for Skilled Therapeutic Intervention evaluation only   Clinical Presentation Stable/Uncomplicated   Clinical Presentation Rationale see above. ModA for mobility   Clinical Decision Making (Complexity) Low complexity   Therapy Frequency` other (see comments)  (eval only)   Predicted Duration of Therapy Intervention (days/wks) 1 day   Anticipated Discharge Disposition Transitional Care Facility   Risk & Benefits of therapy have been explained Yes   Patient, Family & other staff in agreement with plan of care Yes   [facility] [employer] TM \"6 Clicks\"   2016, Trustees of [facility], under license to [employer].  All rights reserved.   6 Clicks Short Forms Basic Mobility Inpatient Short Form   [facility] AM-PAC  \"6 Clicks\" V.2 Basic Mobility Inpatient Short Form   1. " Turning from your back to your side while in a flat bed without using bedrails? 3 - A Little   2. Moving from lying on your back to sitting on the side of a flat bed without using bedrails? 3 - A Little   3. Moving to and from a bed to a chair (including a wheelchair)? 2 - A Lot   4. Standing up from a chair using your arms (e.g., wheelchair, or bedside chair)? 2 - A Lot   5. To walk in hospital room? 2 - A Lot   6. Climbing 3-5 steps with a railing? 1 - Total   Basic Mobility Raw Score (Score out of 24.Lower scores equate to lower levels of function) 13   Total Evaluation Time   Total Evaluation Time (Minutes) 15

## 2018-08-18 NOTE — ED NOTES
"[facility]  ED Nurse Handoff Report    ED Chief complaint: Extremity Weakness (bilateral leg weakness progressively worsening over past coulple days. Lives in independent care in senior housing) and Cough (started cough over the weekend, started on abx by primary MD for presumed pna but today cxr was negative so told to come into ER)      ED Diagnosis:   Final diagnoses:   Cough   Generalized muscle weakness   Physical deconditioning       Code Status: DNR    Allergies:   Allergies   Allergen Reactions     Trospium Swelling     Lower extremity edema     Codeine      dry mouth and nausea     Penicillins      dry mouth     Sulfa Drugs      dry mouth       Activity level - Baseline/Home:  Independent    Activity Level - Current:   Stand with Assist of 2     Needed?: No    Isolation: No  Infection: Not Applicable  Bariatric?: No    Vital Signs:   Vitals:    08/17/18 1806   BP: 175/77   Pulse: 81   Resp: 16   Temp: 98.8  F (37.1  C)   TempSrc: Oral   SpO2: 99%   Weight: 65.8 kg (145 lb)   Height: 1.6 m (5' 3\")       Cardiac Rhythm: ,        Pain level:      Is this patient confused?: No   [place] - Suicide Severity Rating Scale Completed?  Yes  If yes, what color did the patient score?  White    Patient Report: Initial Complaint: Delmis Quarles is a 89 year old female with a history of HTN, blood clots, and neuropathy who presents to the emergency department with her daughter for evaluation of weakness, shortness of breath, and cough. For the past few weeks, the patinet reports shortness of breath and cough. The patient has was started on antibiotics five days ago by her PCP for presumed pneumonia. Since then, the patient reports the continuation of cough and shortness of breath. Then in the past few days, the patient reports increasing fatigue and weakness along with having increasing difficulty lifting her legs, noting she has neuropathy, but is unsure if there is more going on with her " aside from the neuropathy. Then today, the patient's daughter states the patient went downhill, noting the patient was not able to care for herself secondary to her shortness of breath, cough, fatigue, and weakness. The patient reports having a chest xray today, which was negative for remarkable findings. Given this and the patient's declining health, the patient presents here to the ED for evaluation.       Here, the patient denies current shortness of breath, chest pain, abdominal pain, nausea, and vomiting. She continues to complain of her recent fatigue and weakness. She denies history of heart failure, CHF, and MI, smoking, and asthma. Of note, the patient resides in independently living but daughter states they are looking into assisted living. Patient has urinary incontinence but manages this on her own.      Focused Assessment: Resp: Frequent productive cough, c/o sob, Cardiac:WNL Neuro: WNL Musculo: significant increased weakness.   Tests Performed: basic labs, ua, CT chest, TSH, BNP, trop  Abnormal Results:   Results for orders placed or performed during the hospital encounter of 08/17/18 (from the past 24 hour(s))   CBC with platelets differential   Result Value Ref Range    WBC 8.7 4.0 - 11.0 10e9/L    RBC Count 4.06 3.8 - 5.2 10e12/L    Hemoglobin 12.4 11.7 - 15.7 g/dL    Hematocrit 35.6 35.0 - 47.0 %    MCV 88 78 - 100 fl    MCH 30.5 26.5 - 33.0 pg    MCHC 34.8 31.5 - 36.5 g/dL    RDW 13.8 10.0 - 15.0 %    Platelet Count 222 150 - 450 10e9/L    Diff Method Automated Method     % Neutrophils 60.4 %    % Lymphocytes 20.3 %    % Monocytes 13.3 %    % Eosinophils 4.7 %    % Basophils 0.5 %    % Immature Granulocytes 0.8 %    Nucleated RBCs 0 0 /100    Absolute Neutrophil 5.3 1.6 - 8.3 10e9/L    Absolute Lymphocytes 1.8 0.8 - 5.3 10e9/L    Absolute Monocytes 1.2 0.0 - 1.3 10e9/L    Absolute Eosinophils 0.4 0.0 - 0.7 10e9/L    Absolute Basophils 0.0 0.0 - 0.2 10e9/L    Abs Immature Granulocytes 0.1 0 - 0.4  10e9/L    Absolute Nucleated RBC 0.0    Basic metabolic panel   Result Value Ref Range    Sodium 124 (L) 133 - 144 mmol/L    Potassium 4.1 3.4 - 5.3 mmol/L    Chloride 89 (L) 94 - 109 mmol/L    Carbon Dioxide 27 20 - 32 mmol/L    Anion Gap 8 3 - 14 mmol/L    Glucose 117 (H) 70 - 99 mg/dL    Urea Nitrogen 25 7 - 30 mg/dL    Creatinine 1.16 (H) 0.52 - 1.04 mg/dL    GFR Estimate 44 (L) >60 mL/min/1.7m2    GFR Estimate If Black 53 (L) >60 mL/min/1.7m2    Calcium 9.1 8.5 - 10.1 mg/dL   Nt probnp inpatient   Result Value Ref Range    N-Terminal Pro BNP Inpatient 357 0 - 1800 pg/mL   Troponin I   Result Value Ref Range    Troponin I ES <0.015 0.000 - 0.045 ug/L   TSH with free T4 reflex   Result Value Ref Range    TSH 6.57 (H) 0.40 - 4.00 mU/L   T4 free   Result Value Ref Range    T4 Free 1.35 0.76 - 1.46 ng/dL   CT Chest Pulmonary Embolism w Contrast    Narrative    CT CHEST PULMONARY EMBOLISM WITH CONTRAST 8/17/2018 8:10 PM     HISTORY: Weakness, cough for one week. Negative chest x-ray. Concern  for pneumonia versus pulmonary embolism.      TECHNIQUE: Volumetric acquisition of the chest  after the  administration of 73ml Isovue-370 IV contrast. Radiation dose for this  scan was reduced using automated exposure control, adjustment of the  mA and/or kV according to patient size, or iterative reconstruction  technique.     COMPARISON: 10/18/2005 and 10/16/2006    FINDINGS: No visualized pulmonary embolism. Normal heart size.  Coronary artery calcifications. Mild atheromatous changes of the  thoracic aorta. Two tiny left upper lung nodules are stable and  considered benign. Mild bronchial wall thickening in the left lower  lung with some mucoid impaction and minimal bronchiectasis. No  consolidative infiltrates, pleural effusions or other acute findings.  No enlarged mediastinal or hilar lymph nodes.    Two right renal cysts measuring up to 3.4 cm. Partially visualized 1.7  cm lesion at the lateral aspect of the right mid  kidney could be a  normal lobulation or an indeterminant lesion either a hemorrhagic cyst  or possibly a small solid lesion. This could be better evaluated with  follow-up outpatient CT or MRI, if clinically indicated.      Impression    IMPRESSION:  1. Slight bronchial wall thickening with minimal bronchiectasis and  some mucoid impaction in the left lower lung.  2. No visualized pulmonary was more other acute findings in the chest.  3. Possible 1.7 cm right renal lesion partially visualized. This is  indeterminate. Possibilities include some normal cortical lobulation  versus a hemorrhagic cyst or possibly a small solid lesion. If  clinically indicated, follow-up outpatient contrast-enhanced CT or MRI  could be performed.   UA with Microscopic   Result Value Ref Range    Color Urine Straw     Appearance Urine Clear     Glucose Urine Negative NEG^Negative mg/dL    Bilirubin Urine Negative NEG^Negative    Ketones Urine Negative NEG^Negative mg/dL    Specific Gravity Urine 1.012 1.003 - 1.035    Blood Urine Negative NEG^Negative    pH Urine 6.5 5.0 - 7.0 pH    Protein Albumin Urine Negative NEG^Negative mg/dL    Urobilinogen mg/dL Normal 0.0 - 2.0 mg/dL    Nitrite Urine Negative NEG^Negative    Leukocyte Esterase Urine Negative NEG^Negative    Source Midstream Urine     WBC Urine <1 0 - 5 /HPF    RBC Urine 0 0 - 2 /HPF    Bacteria Urine Few (A) NEG^Negative /HPF    Squamous Epithelial /HPF Urine 1 0 - 1 /HPF       Treatments provided: fluids, neb    Family Comments: at bedside    OBS brochure/video discussed/provided to patient: yes    ED Medications:   Medications   0.9% sodium chloride BOLUS (500 mLs Intravenous New Bag 8/17/18 2119)   ipratropium - albuterol 0.5 mg/2.5 mg/3 mL (DUONEB) neb solution 3 mL (not administered)   iopamidol (ISOVUE-370) solution 73 mL (73 mLs Intravenous Given 8/17/18 1944)   Saline (62 mLs As instructed Given 8/17/18 1944)       Drips infusing?:  No    For the majority of the shift this  patient was Green.   Interventions performed were none.    Severe Sepsis OR Septic Shock Diagnosis Present: No      ED NURSE PHONE NUMBER: [phone]

## 2018-08-18 NOTE — PLAN OF CARE
Problem: Patient Care Overview  Goal: Plan of Care/Patient Progress Review  Outcome: Improving  A&O, forgetful. Denied pain. Iv infusing due to low Na 127. Reg diet. Assist x1/GB/W. Unsteady on her feet occasionally. PT consulted, recommended TCU. SW consult pending. Frequent congestion/productive cough. PRN Neb, Mucinex administered. Family at bed side. Staying overnight.

## 2018-08-18 NOTE — PLAN OF CARE
Problem: Patient Care Overview  Goal: Discharge Needs Assessment  Outcome: No Change      Observation goals PRIOR TO DISCHARGE     Comments: -diagnostic tests and consults completed and resulted; needs lab tests   -vital signs normal or at patient baseline ; VSS  -tolerating oral intake to maintain hydration; wnl   -safe disposition plan has been identified; social service to address

## 2018-08-18 NOTE — PROGRESS NOTES
Pt status changed to inpatient. Daughter Denise called and updated. Response: Thank you. Pt will be transferring to unit [address].

## 2018-08-18 NOTE — PLAN OF CARE
Problem: Patient Care Overview  Goal: Discharge Needs Assessment  Outcome: Improving   Observation goals PRIOR TO DISCHARGE     Comments: -diagnostic tests and consults completed and resulted , not met  -vital signs normal or at patient baseline . met  -tolerating oral intake to maintain hydration .met  -safe disposition plan has been identified , not met  Nurse to notify provider when observation goals have been met and patient is ready for discharge

## 2018-08-18 NOTE — H&P
[facility]    History and Physical  Hospitalist       Date of Admission:  8/17/2018    Assessment & Plan   Delmis Quarles is a 89 year old female who presents with bilateral lower extremity weakness which has been progressive over months found to have hyponatremia    Weakness: Suspect multifactorial with neuropathy and hyponatremia as well as advanced age.  Patient has had progressive weakness, worsening over the past months to the point where she had difficulty getting out of bed today secondary to bilateral lower externally weakness.  Has been evaluated by neurology with recent EMGs and follow-up scheduled for early this coming week.  Noted to have hyponatremia, worse than prior, which is likely contributing.  Family has been looking into increased level of care as patient currently resides in a senior living complex.  -Social work consulted for disposition planning; patient actually has been accepted for assisted living at [facility] in [place].  Uncertain as to degree of assistance patient will need at time of discharge.  -Ambulate with nursing staff 4 times daily    Hypochloremic hyponatremia: Serum sodium of 124.  Suspect contribution from both loop diuretic as well as poor oral intake.  Note that patient has been mildly hyponatremic over the past year as well, remains on Bumex for blood pressure control and lower extremity edema.  -Diet as tolerated  -Both Bumex and spironolactone have been discontinued at this time. can consider reinitiation of spironolactone if needed for blood pressure support and swelling  -Received 500 mL normal saline in the emergency department with improvement in serum sodium by 2 mEq.  Additional 500 mL over 4 hours with recheck BMP in a.m. ADDENDUM: following 550 ml NS, serum sodium 128. Additional sodium containing fluids held pending AM BMP.  -Monitor intake and output  -Recheck sodium at 6 AM, noon 8/18.  Pending a.m. sodium, may potentially require  "additional saline resuscitation.  Anticipate majority of sodium correction will take place with discontinuation of Bumex.  If patient corrects to the 130 range and safe disposition plan is identified, can continue follow-up in the outpatient setting.  -Ambulate with nursing staff    Neuropathy: Suspect this is also contributing to patient's sensation of weakness.  Has described right hand \"weakness\" which is actually her description of her neuropathy.  Strength intact in distal right upper extremity.  Possibly some neurogenic claudication with the sensation of her legs feeling heavy, though patient does not describe the typical forward flexion to improve symptoms of her legs when ambulate and.  - continue prior to admission gabapentin  -Request [facility] of neurology records re: recent EMG testing    Bronchitis: Recently prescribed anti-infectives through her primary care provider for possible pneumonia.  Chest x-ray without evidence of pneumonia in outpatient setting, CT of chest with some findings of bronchitis, no pneumonia.  Appears to have been precipitated by a recent upper respiratory illness over the past week, nasal congestion symptoms have since resolved.  -Continue prior to admission Allegra, Flonase  -No anti-infectives currently prescribed.  Monitor for fevers or hypoxia.    Vaginal dryness:  -Continue prior to admission estradiol suppository    History of gastric ulcer, esophageal stricture:  -Continue prior to admission omeprazole    Right renal mass: Incidental finding of 1.7 cm indeterminant right renal cyst on CT of chest 8/17/18.  Findings discussed with patient as well as daughter and son-in-law at bedside on admission.  In the setting of advanced age and desire for no further surgical intervention, at this time they are opting not to pursue further diagnostic imaging.  Aware of finding, and may consider in the future.  This decision making process is consistent with recent decision not to " pursue further mammograms.  -Recommend reassessment of decision to pursue diagnostic imaging by primary care physician assistant following discharge in the coming months.    # Pain Assessment:   Current Pain Score 8/17/18   Pain score (0-10) 0   Pain location -   Pain descriptors    CPOT pain score -   Delmis kauffman pain level was assessed and she currently denies pain.        DVT Prophylaxis: Ambulate every shift    Code Status: DNR / DNI. Confirmed with patient and family at bedside; note that this is a change from prior code status.  Specifics discussed with patient and family included that patient would not desire tube feeding.  This is also a change from prior POLST.    Disposition: Expected discharge likely 8/18/18 evening vs 8/19/18.    Wes Hauser Edwards    Primary Care Physician   Sherry Monteiro    Chief Complaint   Weakness    History is obtained from the patient, chart review, discussion with Dr Boykin in the ED, discussion with daughter and son-in-law at bedside.    History of Present Illness   Delmis Quarles is a 89 year old female who presents with weakness limiting her ability to get out of bed this morning.  Patient is a 89-year-old female who lives independently in a senior living complex.  Receives assistance from her daughter intermittently, though family has been looking into an increased level of care for the patient over the past months. History of intermittent falls. Patient actually has an assisted living apartment reserved at [facility] with a plan to move in the next 1-2 weeks after room has been repainted/refinished.    Patient presents to the emergency department today as she felt too weak to ambulate.  Over the past week, patient has had an upper respiratory illness which was initially precipitated by nasal congestion and some postnasal drip.  Nasal symptoms have resolved, the patient now with a cough.  Was given some anti-infectives by her primary care group, [facility]  physicians, though I am unclear as to what antibiotic this was.  Subsequently a chest x-ray was performed which was negative, and anti-infectives were discontinued as of 8/17/18.  Given patient was too weak to ambulate and currently living independently, patient's family brought her to the emergency department where laboratory analysis was remarkable for hypochloremic hyponatremia with hyponatremia to 124.  History of hyponatremia has been noted over the past year, though this is more notable.  Patient remains on Bumex daily, and over the past week, with a respiratory illness, patient describes eating less at her noon meal.  Has not been losing weight, though is not a big eater overall.  Patient actually has restricted herself somewhat given her concerns for developing diabetes despite her age of 89.    Patient has a history of neuropathy involving bilateral lower extremities as well as distal upper extremities right greater than left.  No diabetes, though appears to have some prediabetes.  Follows with [facility], [facility] and actually underwent EMG testing recently with follow-up early next week to discuss findings/results.  Unfortunately, these results are not available/scanned in through our system at this time.    A CT of the chest was performed in the emergency department given patient's rattling cough.  Noted to have some bronchial thickening consistent with bronchitis with some mucus, no consolidation/pneumonia appreciated.  Incidentally found to have a right renal cyst/mass.  This was discussed with patient and family as above.    Past Medical History    I have reviewed this patient's medical history and updated it with pertinent information if needed.   Past Medical History:   Diagnosis Date     Arthritis      Blood transfusion      Chronic gastric ulcer without mention of hemorrhage, perforation, without mention of obstruction 5/5/2006    EGD, NSAID induced; PPI indefinitely,      Diffuse  cystic mastopathy      Embolism and thrombosis of unspecified site 1992    Post phlebitic syndrome; [employer] stocking     Essential hypertension, benign 1972     Neuropathy     FEET AND HANDS     Osteoporosis, unspecified 1/00    Dexa done in [place], [place]; Fosamax started 1/00     Other lymphedema      Stricture and stenosis of esophagus 2000    dilitation        Past Surgical History   I have reviewed this patient's surgical history and updated it with pertinent information if needed.  Past Surgical History:   Procedure Laterality Date     AMPUTATE TOE(S) BILATERAL  4/23/2014    Procedure: AMPUTATE TOE(S) BILATERAL;  Surgeon: Yelitza Elise DPM, Pod;  Location: [facility] OR     ARTHROPLASTY KNEE  2008    left     ARTHROPLASTY KNEE  1/3/2012    Procedure:ARTHROPLASTY KNEE; Right Total Knee Arthroplasty,       C APPENDECTOMY  1954     C TOTAL ABDOM HYSTERECTOMY  1972    ROCK/BSO     FUSION THORACIC LUMBAR ANTERIOR TWO LEVELS  5/10/2014    Procedure: FUSION THORACIC LUMBAR ANTERIOR TWO LEVELS;  Surgeon: Tomás Worthy MD;  Location: [facility] OR      FLEX SIGMOIDOSCOPY W/WO NATALIE SPEC BY BRUSH/WASH  9/00    colon screen- Flex by Dr. Rosario      REMOVE TONSILS/ADENOIDS,12+ Y/O  ~1938     LIGATN/STRIP LONG & SHORT SAPHEN  2005    RFA of right vein     OPTICAL TRACKING SYSTEM FUSION POSTERIOR SPINE LUMBAR  5/10/2014    Procedure: OPTICAL TRACKING SYSTEM FUSION SPINE POSTERIOR LUMBAR ONE LEVEL;  Surgeon: Tomás Worthy MD;  Location: [facility] OR     REPAIR HAMMER TOE  4/23/2014    Procedure: REPAIR HAMMER TOE;  Surgeon: Yelitza Elise DPM, Pod;  Location:  OR       Prior to Admission Medications   Prior to Admission Medications   Prescriptions Last Dose Informant Patient Reported? Taking?   Calcium Carbonate-Vitamin D (CALCIUM + D) 600-200 MG-UNIT per tablet   Yes No   Sig: Take 2 tablets by mouth daily.   MULTIVITAMIN OR   Yes No   Sig: None Entered   OMEGA 3 PO   Yes No   Sig: DAILY   VITAMIN D 1000 UNIT PO CAPS   Yes No    Si CAPSULE EVERY DAY   aspirin 81 MG EC tablet   No No   Sig: Take 1 tablet (81 mg) by mouth daily   atenolol (TENORMIN) 50 MG tablet   No No   Sig: Take 1 tablet (50 mg) by mouth every morning   benazepril (LOTENSIN) 20 MG tablet   No No   Sig: Take 2 tablets (40 mg) by mouth daily May split dose if desired or take them both at one time.   bumetanide (BUMEX) 1 MG tablet   No No   Sig: TAKE 1/2 TABLET BY MOUTH DAILY   estradiol (VAGIFEM) 10 MCG TABS vaginal tablet   No No   Sig: INSERT 1 TABLET VAGINALLY TWICE WEEKLY   fexofenadine (ALLEGRA) 180 MG tablet   No No   Sig: Take 1 tablet by mouth daily.   fluticasone (FLONASE) 50 MCG/ACT spray   No No   Sig: Spray 2 sprays into both nostrils daily   fluticasone (FLONASE) 50 MCG/ACT spray   No No   Sig: INSTILL 2 SPRAYS INTO BOTH NOSTRILS ONCE DAILY   gabapentin (NEURONTIN) 100 MG capsule   No No   Si mg in the morning and 100 mg at noon  200 mg in the evening   latanoprost (XALATAN) 0.005 % ophthalmic solution   Yes No   Sig: Place 1 drop Into the left eye At Bedtime.   omeprazole (PRILOSEC) 20 MG CR capsule   No No   Sig: TAKE 1 CAPSULE EVERY MORNING DAILY   ondansetron (ZOFRAN ODT) 4 MG ODT tab   No No   Sig: Take 1-2 tablets (4-8 mg) by mouth 3 times daily (before meals)   sodium chloride (OCEAN) 0.65 % nasal spray   No No   Sig: Spray 1 spray into both nostrils daily as needed for congestion   spironolactone (ALDACTONE) 25 MG tablet   No No   Sig: Take 1 tablet (25 mg) by mouth daily (with lunch)   timolol (TIMOPTIC) 0.5 % ophthalmic solution   Yes No   Sig: Place 1 drop into both eyes daily      Facility-Administered Medications: None     Allergies   Allergies   Allergen Reactions     Trospium Swelling     Lower extremity edema     Codeine      dry mouth and nausea     Penicillins      dry mouth     Sulfa Drugs      dry mouth       Social History   I have reviewed this patient's social history and updated it with pertinent information if needed. Delmis  ROSLYN Quarles  reports that she has never smoked. She has never used smokeless tobacco. She reports that she does not drink alcohol or use illicit drugs.     Family History   I have reviewed this patient's family history and updated it with pertinent information if needed.   Family History   Problem Relation Age of Onset     C.A.D. Brother       at 67     C.A.D. Father      CHF     C.A.D. Maternal Uncle      C.A.D. Maternal Uncle      Blood Disease Brother      type of Leukemia      Daughter      Sjogren's       Review of Systems   The 10 point Review of Systems is negative other than noted in the HPI or here.   No fevers, no shortness of breath. Rattling cough has been present for the past 4 or so days  History of intermittent falls. Uses a walker at baseline. No significant forward flexion with ambulation. Has been able to ambulate w/ walker at [facility]'s w/ daughter over past month.  Decreased oral intake over past week w/ upper respiratory illness. No weight loss.    Physical Exam   Temp: 98.8  F (37.1  C) Temp src: Oral BP: 175/77 Pulse: 81   Resp: 16 SpO2: 99 %      Vital Signs with Ranges  Temp:  [98.8  F (37.1  C)] 98.8  F (37.1  C)  Pulse:  [81] 81  Resp:  [16] 16  BP: (175)/(77) 175/77  SpO2:  [99 %] 99 %  145 lbs 0 oz    Constitutional: no acute distress, alert, conversant elderly female lying comfortably in bed.  Eyes: no scleral icterus or injection  HEENT: moist mucous membranes  Respiratory: breath sounds with few basilar rhonchi, no wheezes, good air movement throughout lung fields.  Occasional rattling cough.  Cardiovascular: regular rate and rhythm, no murmur  GI: abdomen soft, non-tender, normoactive bowel sounds, no masses  Lymph/Hematologic: 1+ pitting bilateral lower extremity edema  Genitourinary: not examined  Musculoskeletal: Mild diffuse muscular wasting all extremities associated with advanced age.  No fasciculations.  Neurologic: mental status grossly intact, no focal deficits, alert.  4/5  strength in bilateral hip flexors, equal.  5/5 right  strength and wrist strength against resistance with flexion and extension.  Psychiatric: normal affect, pleasant    Data   Data reviewed today:  I personally reviewed the chest CT image(s) showing Some bronchial wall thickening, right renal cyst/mass approximately 2 cm diameter.    Recent Labs  Lab 08/17/18  1822   WBC 8.7   HGB 12.4   MCV 88      *   POTASSIUM 4.1   CHLORIDE 89*   CO2 27   BUN 25   CR 1.16*   ANIONGAP 8   MAKI 9.1   *   TROPI <0.015       Recent Results (from the past 24 hour(s))   CT Chest Pulmonary Embolism w Contrast    Narrative    CT CHEST PULMONARY EMBOLISM WITH CONTRAST 8/17/2018 8:10 PM     HISTORY: Weakness, cough for one week. Negative chest x-ray. Concern  for pneumonia versus pulmonary embolism.      TECHNIQUE: Volumetric acquisition of the chest  after the  administration of 73ml Isovue-370 IV contrast. Radiation dose for this  scan was reduced using automated exposure control, adjustment of the  mA and/or kV according to patient size, or iterative reconstruction  technique.     COMPARISON: 10/18/2005 and 10/16/2006    FINDINGS: No visualized pulmonary embolism. Normal heart size.  Coronary artery calcifications. Mild atheromatous changes of the  thoracic aorta. Two tiny left upper lung nodules are stable and  considered benign. Mild bronchial wall thickening in the left lower  lung with some mucoid impaction and minimal bronchiectasis. No  consolidative infiltrates, pleural effusions or other acute findings.  No enlarged mediastinal or hilar lymph nodes.    Two right renal cysts measuring up to 3.4 cm. Partially visualized 1.7  cm lesion at the lateral aspect of the right mid kidney could be a  normal lobulation or an indeterminant lesion either a hemorrhagic cyst  or possibly a small solid lesion. This could be better evaluated with  follow-up outpatient CT or MRI, if clinically indicated.      Impression     IMPRESSION:  1. Slight bronchial wall thickening with minimal bronchiectasis and  some mucoid impaction in the left lower lung.  2. No visualized pulmonary was more other acute findings in the chest.  3. Possible 1.7 cm right renal lesion partially visualized. This is  indeterminate. Possibilities include some normal cortical lobulation  versus a hemorrhagic cyst or possibly a small solid lesion. If  clinically indicated, follow-up outpatient contrast-enhanced CT or MRI  could be performed.

## 2018-08-19 LAB
ANION GAP SERPL CALCULATED.3IONS-SCNC: 5 MMOL/L (ref 3–14)
BUN SERPL-MCNC: 17 MG/DL (ref 7–30)
CALCIUM SERPL-MCNC: 8.4 MG/DL (ref 8.5–10.1)
CHLORIDE SERPL-SCNC: 99 MMOL/L (ref 94–109)
CO2 SERPL-SCNC: 28 MMOL/L (ref 20–32)
CREAT SERPL-MCNC: 0.92 MG/DL (ref 0.52–1.04)
GFR SERPL CREATININE-BSD FRML MDRD: 57 ML/MIN/1.7M2
GLUCOSE SERPL-MCNC: 105 MG/DL (ref 70–99)
POTASSIUM SERPL-SCNC: 4.8 MMOL/L (ref 3.4–5.3)
SODIUM SERPL-SCNC: 132 MMOL/L (ref 133–144)

## 2018-08-19 PROCEDURE — 25000132 ZZH RX MED GY IP 250 OP 250 PS 637: Mod: GY | Performed by: PHYSICIAN ASSISTANT

## 2018-08-19 PROCEDURE — 80048 BASIC METABOLIC PNL TOTAL CA: CPT | Performed by: PHYSICIAN ASSISTANT

## 2018-08-19 PROCEDURE — 99232 SBSQ HOSP IP/OBS MODERATE 35: CPT | Performed by: HOSPITALIST

## 2018-08-19 PROCEDURE — A9270 NON-COVERED ITEM OR SERVICE: HCPCS | Mod: GY | Performed by: INTERNAL MEDICINE

## 2018-08-19 PROCEDURE — A9270 NON-COVERED ITEM OR SERVICE: HCPCS | Mod: GY | Performed by: HOSPITALIST

## 2018-08-19 PROCEDURE — 25000128 H RX IP 250 OP 636: Performed by: PHYSICIAN ASSISTANT

## 2018-08-19 PROCEDURE — 12000000 ZZH R&B MED SURG/OB

## 2018-08-19 PROCEDURE — 25000132 ZZH RX MED GY IP 250 OP 250 PS 637: Mod: GY | Performed by: HOSPITALIST

## 2018-08-19 PROCEDURE — 36415 COLL VENOUS BLD VENIPUNCTURE: CPT | Performed by: PHYSICIAN ASSISTANT

## 2018-08-19 PROCEDURE — A9270 NON-COVERED ITEM OR SERVICE: HCPCS | Mod: GY | Performed by: PHYSICIAN ASSISTANT

## 2018-08-19 PROCEDURE — 25000132 ZZH RX MED GY IP 250 OP 250 PS 637: Mod: GY | Performed by: INTERNAL MEDICINE

## 2018-08-19 RX ORDER — GUAIFENESIN/DEXTROMETHORPHAN 100-10MG/5
5 SYRUP ORAL EVERY 4 HOURS PRN
Status: DISCONTINUED | OUTPATIENT
Start: 2018-08-19 | End: 2018-08-21 | Stop reason: HOSPADM

## 2018-08-19 RX ADMIN — GUAIFENESIN AND DEXTROMETHORPHAN 5 ML: 100; 10 SYRUP ORAL at 13:03

## 2018-08-19 RX ADMIN — LATANOPROST 1 DROP: 50 SOLUTION/ DROPS OPHTHALMIC at 22:11

## 2018-08-19 RX ADMIN — GABAPENTIN 100 MG: 100 CAPSULE ORAL at 13:01

## 2018-08-19 RX ADMIN — ASPIRIN 81 MG: 81 TABLET, COATED ORAL at 20:06

## 2018-08-19 RX ADMIN — SODIUM CHLORIDE: 9 INJECTION, SOLUTION INTRAVENOUS at 01:27

## 2018-08-19 RX ADMIN — GUAIFENESIN 10 ML: 200 SOLUTION ORAL at 20:06

## 2018-08-19 RX ADMIN — TIMOLOL MALEATE 1 DROP: 5 SOLUTION OPHTHALMIC at 10:10

## 2018-08-19 RX ADMIN — OMEPRAZOLE 20 MG: 20 CAPSULE, DELAYED RELEASE ORAL at 06:58

## 2018-08-19 RX ADMIN — FLUTICASONE PROPIONATE 2 SPRAY: 50 SPRAY, METERED NASAL at 08:08

## 2018-08-19 RX ADMIN — LISINOPRIL 20 MG: 20 TABLET ORAL at 20:06

## 2018-08-19 RX ADMIN — GABAPENTIN 200 MG: 100 CAPSULE ORAL at 08:06

## 2018-08-19 RX ADMIN — GABAPENTIN 200 MG: 100 CAPSULE ORAL at 20:06

## 2018-08-19 RX ADMIN — GUAIFENESIN 10 ML: 200 SOLUTION ORAL at 03:51

## 2018-08-19 RX ADMIN — GUAIFENESIN 600 MG: 600 TABLET, EXTENDED RELEASE ORAL at 08:06

## 2018-08-19 RX ADMIN — LISINOPRIL 20 MG: 20 TABLET ORAL at 08:06

## 2018-08-19 ASSESSMENT — ACTIVITIES OF DAILY LIVING (ADL)
ADLS_ACUITY_SCORE: 18
ADLS_ACUITY_SCORE: 19
ADLS_ACUITY_SCORE: 16
ADLS_ACUITY_SCORE: 20
ADLS_ACUITY_SCORE: 18
ADLS_ACUITY_SCORE: 16

## 2018-08-19 NOTE — PROGRESS NOTES
Admission    Patient arrives to room 605-2 via cart from OB unit.  Care plan note: Pt is A&O x 4, forgetful at times. Calm and cooperative for cares. VSS on RA, denied pain and SOB. Regular diet and tolerating well. Na 127, receiving IVF at 75 cc/hr. Incontinent to bladder. Coccyx area redeemed, non-blanchable, open area noted, applied barrier cream. WOC consult in place. Discharge pending in progress. cotinue to monitor.     Inpatient nursing criteria listed below were met:    PCD's Documented: Yes  Skin issues/needs documented :Yes  Isolation education started/completed No  Patient allergies verified with patient: Yes  Verified completion of [place] Risk Assessment Tool:  Yes  Verified completion of Guardianship screening tool: Yes  Fall Prevention: Care plan updated, Education given and documented Yes  Care Plan initiated: Yes  Home medications documented in belongings flowsheet: NA  Patient belongings documented in belongings flowsheet: Yes  Reminder note (belongings/ medications) placed in discharge instructions:No  Admission profile/ required documentation complete: No

## 2018-08-19 NOTE — PLAN OF CARE
Problem: Patient Care Overview  Goal: Plan of Care/Patient Progress Review  Outcome: Improving  Pt A/Ox4, VSS on RA. Pt denies pain, N/V and SOB. Frequent productive cough. PRN Robitussin given. Up in chair x 2 for meals. Incontinent of urine. No BM throughout shift, +BS in all four quadrants, passing flatus. Rt buttocks pressure injury, barrier cream applied. Most recent . T/R Q2H, Regular diet, tolerating. DC pending, left message for social work to see if a bed is available at [facility]CU. Will continue to monitor.

## 2018-08-19 NOTE — PROGRESS NOTES
[facility]  Hospitalist Progress Note        Donte Palma, DO  08/19/2018        Interval History:      Patient states that she has a cough, requesting cough medicine. Updated on plan of care and sodium level.          Assessment and Plan:        Delmis Quarles is a 89 year old female who was admitted on 8/17/2018 after presenting with significant wekaness.      Acute on chronic lower extremity weakness: Suspect multifactorial with neuropathy and hyponatremia as well as advanced age.  Patient has had progressive weakness, worsening over the past months, but with acute change day of admission to the point where she could not get out of bed. She has been evaluated by neurology with recent EMGs and follow-up scheduled for early this coming week.  Noted to have hyponatremia, worse than prior, which is likely contributing.  Family has been looking into increased level of care as patient currently resides in a senior living complex. She notes improvement today after receiving IVF and is able to ambulate though requires nursing assistance due to significant unsteadiness. She has no focal weakness on exam.   - PT following.   - Follow up with Neurology as scheduled to review EMG results     Hypochloremic hyponatremia: Serum sodium of 124 on admission.  Suspect contribution from both loop diuretic as well as poor oral intake.  Note that patient has been mildly hyponatremic over the past year as well, remains on Bumex for blood pressure control and lower extremity edema.  - Diet as tolerated  - Hold bumex and spironolactone at this time.      Bronchitis: Recently prescribed anti-infectives through her primary care provider for possible pneumonia.  Chest x-ray without evidence of pneumonia in outpatient setting, CT of chest with some findings of bronchitis, no pneumonia.    - Guaifenesin prn.      Neuropathy: Suspect this is also contributing to patient's sensation of weakness.     - Continue prior  "to admission gabapentin      Vaginal dryness: Stable.   - Continue prior to admission estradiol suppository      History of gastric ulcer, esophageal stricture: Stable.   - Continue prior to admission omeprazole      Right renal mass: Incidental finding of 1.7 cm indeterminant right renal cyst on CT of chest 18.  Findings discussed with patient as well as daughter and son-in-law at bedside on admission, per report.  In the setting of advanced age and desire for no further surgical intervention, at this time they are opting not to pursue further diagnostic imaging.  Aware of finding, and may consider in the future.  This decision making process is consistent with recent decision not to pursue further mammograms.  - Recommend reassessment of decision to pursue diagnostic imaging by primary care physician assistant following discharge     DVT Prophylaxis: Pneumatic Compression Devices    Code: DNR/DNI     Disposition: Expected discharge in 1-2 days to TCU pending improved sodium     Pain: # Pain Assessment:  Current Pain Score 2018   Patient currently in pain? denies   Pain score (0-10) -   Pain location -   Pain descriptors -   CPOT pain score -   Delmis kauffman pain level was assessed and she currently denies pain.                     Physical Exam:           Blood pressure 149/69, pulse 77, temperature 98.9  F (37.2  C), temperature source Oral, resp. rate 14, height 1.6 m (5' 2.99\"), weight 69.9 kg (154 lb 3.2 oz), SpO2 97 %, not currently breastfeeding.    Vitals:    18 1806 18 2358   Weight: 65.8 kg (145 lb) 69.9 kg (154 lb 3.2 oz)       Vital Sign Ranges  Temperature Temp  Av  F (36.7  C)  Min: 96.5  F (35.8  C)  Max: 98.9  F (37.2  C)   Blood pressure Systolic (24hrs), Av , Min:113 , Max:149        Diastolic (24hrs), Av, Min:48, Max:69      Pulse Pulse  Av.4  Min: 71  Max: 77   Respirations Resp  Av.3  Min: 14  Max: 18   Pulse oximetry SpO2  Av.3 %  Min: 96 %  Max: " 100 %     Vital Signs with Ranges  Temp:  [96.5  F (35.8  C)-98.9  F (37.2  C)] 98.9  F (37.2  C)  Pulse:  [71-77] 77  Resp:  [14-18] 14  BP: (113-149)/(48-69) 149/69  SpO2:  [96 %-100 %] 97 %    I/O Last 3 Shifts:   I/O last 3 completed shifts:  In: 1893 [P.O.:420; I.V.:1473]  Out: -     I/O past 24 hours:     Intake/Output Summary (Last 24 hours) at 08/19/18 0821  Last data filed at 08/19/18 0659   Gross per 24 hour   Intake             1893 ml   Output                0 ml   Net             1893 ml     GENERAL: Alert and confused. NAD. Conversational, appropriate.   HEENT: Normocephalic. EOMI. No icterus or injection. Nares normal.   LUNGS: Decrement to bases, non-productive cough. No dyspnea at rest.   HEART: Regular rate. Extremities perfused.   ABDOMEN: Soft, nontender, and nondistended. Positive bowel sounds.   EXTREMITIES: LE edema noted.   NEUROLOGIC: Moves extremities x4. Follows commands.          Prior to Admission Medications:        Prescriptions Prior to Admission   Medication Sig Dispense Refill Last Dose     aspirin 81 MG EC tablet Take 1 tablet (81 mg) by mouth daily (Patient taking differently: Take 81 mg by mouth every evening ) 90 tablet 3 8/16/2018 at pm     benazepril (LOTENSIN) 20 MG tablet Take 2 tablets (40 mg) by mouth daily May split dose if desired or take them both at one time. (Patient taking differently: Take 20 mg by mouth 2 times daily May split dose if desired or take them both at one time.) 180 tablet 1 8/17/2018 at am x 1 dose     bumetanide (BUMEX) 1 MG tablet TAKE 1/2 TABLET BY MOUTH DAILY 15 tablet 0 8/17/2018 at 1200     Calcium Carbonate-Vitamin D (CALCIUM + D) 600-200 MG-UNIT per tablet Take 1 tablet by mouth 2 times daily    8/17/2018 at am x 1 dose     estradiol (VAGIFEM) 10 MCG TABS vaginal tablet INSERT 1 TABLET VAGINALLY TWICE WEEKLY (Patient taking differently: INSERT 1 TABLET VAGINALLY TWICE WEEKLY Tues/Fri) 24 tablet 0 8/14/2018     fexofenadine (ALLEGRA) 180 MG  tablet Take 180 mg by mouth daily as needed for allergies   prn med     fluticasone (FLONASE) 50 MCG/ACT spray INSTILL 2 SPRAYS INTO BOTH NOSTRILS ONCE DAILY 16 mL 0 8/17/2018 at am     gabapentin (NEURONTIN) 100 MG capsule 200 mg in the morning and 100 mg at noon  200 mg in the evening 450 capsule 1 8/17/2018 at x 2 doses     latanoprost (XALATAN) 0.005 % ophthalmic solution Place 1 drop into both eyes At Bedtime    8/16/2018 at pm     multivitamin, therapeutic with minerals (MULTI-VITAMIN) TABS tablet Take 1 tablet by mouth daily   8/17/2018 at am     omeprazole (PRILOSEC) 20 MG CR capsule TAKE 1 CAPSULE EVERY MORNING DAILY 90 capsule 0 8/17/2018 at am     spironolactone (ALDACTONE) 25 MG tablet Take 1 tablet (25 mg) by mouth daily (with lunch) 90 tablet 1 8/17/2018 at 1200     timolol (TIMOPTIC) 0.5 % ophthalmic solution Place 1 drop into both eyes daily   8/17/2018 at am     VITAMIN D 1000 UNIT PO CAPS 2 CAPSULE EVERY DAY  0 8/17/2018 at am            Medications:        Current Facility-Administered Medications   Medication Last Rate     sodium chloride 75 mL/hr at 08/19/18 0127     Current Facility-Administered Medications   Medication Dose Route Frequency     aspirin  81 mg Oral QPM     estradiol  10 mcg Vaginal Once per day on Tue Fri     fluticasone  2 spray Both Nostrils Daily     gabapentin  100 mg Oral Daily     gabapentin  200 mg Oral BID     guaiFENesin  600 mg Oral BID     latanoprost  1 drop Both Eyes At Bedtime     lisinopril  20 mg Oral BID     omeprazole  20 mg Oral QAM AC     sodium chloride (PF)  3 mL Intracatheter Q8H     timolol  1 drop Both Eyes Daily     Current Facility-Administered Medications   Medication Dose Route Frequency     acetaminophen  650 mg Rectal Q4H PRN     acetaminophen  650 mg Oral Q4H PRN     albuterol  2.5 mg Nebulization Q2H PRN     bisacodyl  10 mg Rectal Daily PRN     fexofenadine  180 mg Oral Daily PRN     guaiFENesin  10 mL Oral Q4H PRN     lidocaine 4%   Topical Q1H  PRN     lidocaine (buffered or not buffered)  1 mL Other Q1H PRN     melatonin  1 mg Oral At Bedtime PRN     naloxone  0.1-0.4 mg Intravenous Q2 Min PRN     ondansetron  4 mg Oral Q6H PRN    Or     ondansetron  4 mg Intravenous Q6H PRN     polyethylene glycol  17 g Oral Daily PRN     prochlorperazine  5 mg Intravenous Q6H PRN    Or     prochlorperazine  5 mg Oral Q6H PRN    Or     prochlorperazine  12.5 mg Rectal Q12H PRN     senna-docusate  1 tablet Oral BID PRN    Or     senna-docusate  2 tablet Oral BID PRN     sodium chloride (PF)  3 mL Intracatheter Q1H PRN            Data:      Lab data reviewed.     Recent Labs  Lab 08/18/18  1812 08/18/18  1155 08/18/18  0610  08/17/18  1822   HGB  --   --   --   --  12.4   MCV  --   --   --   --  88   PLT  --   --   --   --  222   * 127* 128*  < > 124*   POTASSIUM  --   --  4.2  --  4.1   CHLORIDE  --   --  94  --  89*   CO2  --   --  26  --  27   BUN  --   --  21  --  25   CR  --   --  0.94  --  1.16*   ANIONGAP  --   --  8  --  8   MAKI  --   --  8.3*  --  9.1   GLC  --   --  117*  --  117*   TROPI  --   --   --   --  <0.015   < > = values in this interval not displayed.        Imaging:      Imaging data reviewed.     Dr. Donte Palma D.O.  [facility]  Pager [phone]

## 2018-08-19 NOTE — PLAN OF CARE
Problem: Patient Care Overview  Goal: Plan of Care/Patient Progress Review  Outcome: No Change  A/Ox4. RA. VSS. Turned and repositioned q 2 hours with brief changes. Incontinent. Rt buttocks pressure injury; barrier cream applied. Regular diet. Assist x1. IVF @75. Na 127. Frequent cough; cough drops utilized; gave robitussin x1. DC pending stable sodium levels.

## 2018-08-19 NOTE — PROGRESS NOTES
SW:  D:  Spoke with daughter, Denise, at her request.  [facility] and writer will let the facility know when patient is ready for discharge.  We also discussed that because patient is now under In-patient care she may qualify for SNF benefits depending upon her length of stay in the hospital.    Denise is accepting patient may be medically stable for discharge before she has had 3 nights of In-patient status.  P:  Will follow up with daughter Denise tomorrow after hospitalist rounds.

## 2018-08-20 LAB
ERYTHROCYTE [DISTWIDTH] IN BLOOD BY AUTOMATED COUNT: 14.2 % (ref 10–15)
HCT VFR BLD AUTO: 35 % (ref 35–47)
HGB BLD-MCNC: 11.9 G/DL (ref 11.7–15.7)
MCH RBC QN AUTO: 30.3 PG (ref 26.5–33)
MCHC RBC AUTO-ENTMCNC: 34 G/DL (ref 31.5–36.5)
MCV RBC AUTO: 89 FL (ref 78–100)
PLATELET # BLD AUTO: 248 10E9/L (ref 150–450)
RBC # BLD AUTO: 3.93 10E12/L (ref 3.8–5.2)
WBC # BLD AUTO: 7.2 10E9/L (ref 4–11)

## 2018-08-20 PROCEDURE — 12000000 ZZH R&B MED SURG/OB

## 2018-08-20 PROCEDURE — 99232 SBSQ HOSP IP/OBS MODERATE 35: CPT | Performed by: HOSPITALIST

## 2018-08-20 PROCEDURE — G0463 HOSPITAL OUTPT CLINIC VISIT: HCPCS

## 2018-08-20 PROCEDURE — 25000132 ZZH RX MED GY IP 250 OP 250 PS 637: Mod: GY | Performed by: INTERNAL MEDICINE

## 2018-08-20 PROCEDURE — 85027 COMPLETE CBC AUTOMATED: CPT | Performed by: HOSPITALIST

## 2018-08-20 PROCEDURE — 36415 COLL VENOUS BLD VENIPUNCTURE: CPT | Performed by: HOSPITALIST

## 2018-08-20 PROCEDURE — A9270 NON-COVERED ITEM OR SERVICE: HCPCS | Mod: GY | Performed by: INTERNAL MEDICINE

## 2018-08-20 RX ADMIN — GUAIFENESIN 10 ML: 200 SOLUTION ORAL at 00:19

## 2018-08-20 RX ADMIN — FLUTICASONE PROPIONATE 2 SPRAY: 50 SPRAY, METERED NASAL at 07:53

## 2018-08-20 RX ADMIN — ASPIRIN 81 MG: 81 TABLET, COATED ORAL at 21:10

## 2018-08-20 RX ADMIN — LISINOPRIL 20 MG: 20 TABLET ORAL at 21:10

## 2018-08-20 RX ADMIN — OMEPRAZOLE 20 MG: 20 CAPSULE, DELAYED RELEASE ORAL at 06:30

## 2018-08-20 RX ADMIN — LATANOPROST 1 DROP: 50 SOLUTION/ DROPS OPHTHALMIC at 21:09

## 2018-08-20 RX ADMIN — GABAPENTIN 200 MG: 100 CAPSULE ORAL at 21:10

## 2018-08-20 RX ADMIN — LISINOPRIL 20 MG: 20 TABLET ORAL at 07:54

## 2018-08-20 RX ADMIN — TIMOLOL MALEATE 1 DROP: 5 SOLUTION OPHTHALMIC at 07:54

## 2018-08-20 RX ADMIN — GABAPENTIN 200 MG: 100 CAPSULE ORAL at 07:54

## 2018-08-20 RX ADMIN — GABAPENTIN 100 MG: 100 CAPSULE ORAL at 11:43

## 2018-08-20 ASSESSMENT — ACTIVITIES OF DAILY LIVING (ADL)
ADLS_ACUITY_SCORE: 19
ADLS_ACUITY_SCORE: 21
ADLS_ACUITY_SCORE: 19
ADLS_ACUITY_SCORE: 19

## 2018-08-20 NOTE — PLAN OF CARE
Problem: Patient Care Overview  Goal: Plan of Care/Patient Progress Review  Outcome: Improving  Pt A/Ox4, VSS on RA. Pt denies pain, N/V and SOB. Frequent productive cough. Up in chair x 2 for meals. Incontinent of urine. No BM throughout shift, +BS in all four quadrants, passing flatus. Rt buttocks pressure injury, barrier cream applied. WOC consult completed, continue to applied barrier cream and powder. Most recent . T/R Q2H, Regular diet, tolerating. DC pending. Will continue to monitor.

## 2018-08-20 NOTE — PLAN OF CARE
Problem: Patient Care Overview  Goal: Plan of Care/Patient Progress Review  Outcome: Improving  A/Ox4. VSS. RA. Ax1/Ax2. Incontinent. Turn/repo q2 hrs. Rash around upper thighs. Red buttocks; barrier cream applied. Fluid restriction 1200 ml; pt drank sips of water during each brief change. Regular diet. Na 132, _____. Frequent productive cough; robitussin x1; cough drops at pt bedside. New IV put in; previous was leaking when flushed. Denies SOB, chest pain, N/V/D. Nursing will continue to monitor. DC to TCU pending improved sodium.

## 2018-08-20 NOTE — PLAN OF CARE
Problem: Patient Care Overview  Goal: Plan of Care/Patient Progress Review  Pt is A&Ox4, calm and cooperative for cares. VSS on RA. denies pain and SOB. Lung sound coarse/fine crackles on bases with frequent productive cough. PRN Robitussin given x 1. Fluid restriction of 1200 cc. Latest Na 132. Regular diet with good appetite. Up for meals. BS+  No BM on this shift. Incontinent to urine, coccyx nonblanchable reddened, barrier cream applied. Turn & reposition Q hrs. Regular diet, tolerating well. Discharge pending in progress. Continue to monitor.

## 2018-08-20 NOTE — PROGRESS NOTES
[facility]  Hospitalist Progress Note        Donte Darleen Louie, DO  08/20/2018        Interval History:      Patient states she is still feeling dizzy and lightheaded. Discussed plan of care and improvement of sodium level.          Assessment and Plan:        Delmis Quarles is a 89 year old female who was admitted on 8/17/2018 after presenting with significant wekaness.       Acute on chronic lower extremity weakness: Suspect multifactorial with neuropathy and hyponatremia as well as advanced age.  Patient has had progressive weakness, worsening over the past months, but with acute change day of admission to the point where she could not get out of bed. She has been evaluated by neurology with recent EMGs and follow-up scheduled for early this coming week.  Noted to have hyponatremia, worse than prior, which is likely contributing.  Family has been looking into increased level of care as patient currently resides in a senior living complex. She notes improvement today after receiving IVF and is able to ambulate though requires nursing assistance due to significant unsteadiness. She has no focal weakness on exam.   - PT following.   - Follow up with Neurology as scheduled to review EMG results      Hypochloremic hyponatremia: Serum sodium of 124 on admission.  Suspect contribution from both loop diuretic as well as poor oral intake.  Note that patient has been mildly hyponatremic over the past year as well, remains on Bumex for blood pressure control and lower extremity edema.  - Diet as tolerated  - Hold bumex and spironolactone at this time.       Bronchitis: Recently prescribed anti-infectives through her primary care provider for possible pneumonia.  Chest x-ray without evidence of pneumonia in outpatient setting, CT of chest with some findings of bronchitis, no pneumonia.    - Guaifenesin prn.       Neuropathy: Suspect this is also contributing to patient's sensation of weakness.  "    - Continue prior to admission gabapentin      Vaginal dryness: Stable.   - Continue prior to admission estradiol suppository      History of gastric ulcer, esophageal stricture: Stable.   - Continue prior to admission omeprazole      Right renal mass: Incidental finding of 1.7 cm indeterminant right renal cyst on CT of chest 18.  Findings discussed with patient as well as daughter and son-in-law at bedside on admission, per report.  In the setting of advanced age and desire for no further surgical intervention, at this time they are opting not to pursue further diagnostic imaging.  Aware of finding, and may consider in the future.  This decision making process is consistent with recent decision not to pursue further mammograms.  - Recommend reassessment of decision to pursue diagnostic imaging by primary care physician assistant following discharge      DVT Prophylaxis: Pneumatic Compression Devices     Code: DNR/DNI      Disposition: Expected discharge when sodium normalized, dizziness resolved, likely .     Pain: # Pain Assessment:  Current Pain Score 2018   Patient currently in pain? denies   Pain score (0-10) -   Pain location -   Pain descriptors -   CPOT pain score -   Delmis kauffman pain level was assessed and she currently denies pain.                     Physical Exam:           Blood pressure 135/57, pulse 65, temperature 97.8  F (36.6  C), temperature source Oral, resp. rate 16, height 1.6 m (5' 2.99\"), weight 67.8 kg (149 lb 7.6 oz), SpO2 95 %, not currently breastfeeding.    Vitals:    18 1806 18 2358 18 0654   Weight: 65.8 kg (145 lb) 69.9 kg (154 lb 3.2 oz) 67.8 kg (149 lb 7.6 oz)       Vital Sign Ranges  Temperature Temp  Av.1  F (36.7  C)  Min: 97.8  F (36.6  C)  Max: 98.4  F (36.9  C)   Blood pressure Systolic (24hrs), Av , Min:131 , Max:146        Diastolic (24hrs), Av, Min:57, Max:67      Pulse Pulse  Av.8  Min: 65  Max: 84   Respirations Resp  " Av  Min: 16  Max: 16   Pulse oximetry SpO2  Av %  Min: 95 %  Max: 97 %     Vital Signs with Ranges  Temp:  [97.8  F (36.6  C)-98.4  F (36.9  C)] 97.8  F (36.6  C)  Pulse:  [65-84] 65  Resp:  [16] 16  BP: (131-146)/(57-67) 135/57  SpO2:  [95 %-97 %] 95 %    I/O Last 3 Shifts:   I/O last 3 completed shifts:  In: 440 [P.O.:440]  Out: -     I/O past 24 hours:     Intake/Output Summary (Last 24 hours) at 18 0850  Last data filed at 18 2300   Gross per 24 hour   Intake              440 ml   Output                0 ml   Net              440 ml     GENERAL: Alert and confused. NAD. Conversational, appropriate. Dizzy.   HEENT: Normocephalic. EOMI. No icterus or injection. Nares normal.   LUNGS: Decrement to bases, non-productive cough. No dyspnea at rest.   HEART: Regular rate. Extremities perfused.   ABDOMEN: Soft, nontender, and nondistended. Positive bowel sounds.   EXTREMITIES: LE edema noted.   NEUROLOGIC: Moves extremities x4. Follows commands.          Prior to Admission Medications:        Prescriptions Prior to Admission   Medication Sig Dispense Refill Last Dose     aspirin 81 MG EC tablet Take 1 tablet (81 mg) by mouth daily (Patient taking differently: Take 81 mg by mouth every evening ) 90 tablet 3 2018 at pm     benazepril (LOTENSIN) 20 MG tablet Take 2 tablets (40 mg) by mouth daily May split dose if desired or take them both at one time. (Patient taking differently: Take 20 mg by mouth 2 times daily May split dose if desired or take them both at one time.) 180 tablet 1 2018 at am x 1 dose     bumetanide (BUMEX) 1 MG tablet TAKE 1/2 TABLET BY MOUTH DAILY 15 tablet 0 2018 at 1200     Calcium Carbonate-Vitamin D (CALCIUM + D) 600-200 MG-UNIT per tablet Take 1 tablet by mouth 2 times daily    2018 at am x 1 dose     estradiol (VAGIFEM) 10 MCG TABS vaginal tablet INSERT 1 TABLET VAGINALLY TWICE WEEKLY (Patient taking differently: INSERT 1 TABLET VAGINALLY TWICE WEEKLY  Tues/Fri) 24 tablet 0 8/14/2018     fexofenadine (ALLEGRA) 180 MG tablet Take 180 mg by mouth daily as needed for allergies   prn med     fluticasone (FLONASE) 50 MCG/ACT spray INSTILL 2 SPRAYS INTO BOTH NOSTRILS ONCE DAILY 16 mL 0 8/17/2018 at am     gabapentin (NEURONTIN) 100 MG capsule 200 mg in the morning and 100 mg at noon  200 mg in the evening 450 capsule 1 8/17/2018 at x 2 doses     latanoprost (XALATAN) 0.005 % ophthalmic solution Place 1 drop into both eyes At Bedtime    8/16/2018 at pm     multivitamin, therapeutic with minerals (MULTI-VITAMIN) TABS tablet Take 1 tablet by mouth daily   8/17/2018 at am     omeprazole (PRILOSEC) 20 MG CR capsule TAKE 1 CAPSULE EVERY MORNING DAILY 90 capsule 0 8/17/2018 at am     spironolactone (ALDACTONE) 25 MG tablet Take 1 tablet (25 mg) by mouth daily (with lunch) 90 tablet 1 8/17/2018 at 1200     timolol (TIMOPTIC) 0.5 % ophthalmic solution Place 1 drop into both eyes daily   8/17/2018 at am     VITAMIN D 1000 UNIT PO CAPS 2 CAPSULE EVERY DAY  0 8/17/2018 at am            Medications:        Current Facility-Administered Medications   Medication Last Rate     Current Facility-Administered Medications   Medication Dose Route Frequency     aspirin  81 mg Oral QPM     estradiol  10 mcg Vaginal Once per day on Tue Fri     fluticasone  2 spray Both Nostrils Daily     gabapentin  100 mg Oral Daily     gabapentin  200 mg Oral BID     latanoprost  1 drop Both Eyes At Bedtime     lisinopril  20 mg Oral BID     omeprazole  20 mg Oral QAM AC     sodium chloride (PF)  3 mL Intracatheter Q8H     timolol  1 drop Both Eyes Daily     Current Facility-Administered Medications   Medication Dose Route Frequency     acetaminophen  650 mg Rectal Q4H PRN     acetaminophen  650 mg Oral Q4H PRN     albuterol  2.5 mg Nebulization Q2H PRN     bisacodyl  10 mg Rectal Daily PRN     fexofenadine  180 mg Oral Daily PRN     guaiFENesin  10 mL Oral Q4H PRN     guaiFENesin-dextromethorphan  5 mL Oral  Q4H PRN     lidocaine 4%   Topical Q1H PRN     lidocaine (buffered or not buffered)  1 mL Other Q1H PRN     melatonin  1 mg Oral At Bedtime PRN     naloxone  0.1-0.4 mg Intravenous Q2 Min PRN     ondansetron  4 mg Oral Q6H PRN    Or     ondansetron  4 mg Intravenous Q6H PRN     polyethylene glycol  17 g Oral Daily PRN     prochlorperazine  5 mg Intravenous Q6H PRN    Or     prochlorperazine  5 mg Oral Q6H PRN    Or     prochlorperazine  12.5 mg Rectal Q12H PRN     senna-docusate  1 tablet Oral BID PRN    Or     senna-docusate  2 tablet Oral BID PRN     sodium chloride (PF)  3 mL Intracatheter Q1H PRN            Data:      Lab data reviewed.     Recent Labs  Lab 08/20/18  0806 08/19/18  1114 08/18/18  1812 08/18/18  1155 08/18/18  0610  08/17/18  1822   HGB 11.9  --   --   --   --   --  12.4   MCV 89  --   --   --   --   --  88     --   --   --   --   --  222   NA  --  132* 127* 127* 128*  < > 124*   POTASSIUM  --  4.8  --   --  4.2  --  4.1   CHLORIDE  --  99  --   --  94  --  89*   CO2  --  28  --   --  26  --  27   BUN  --  17  --   --  21  --  25   CR  --  0.92  --   --  0.94  --  1.16*   ANIONGAP  --  5  --   --  8  --  8   MAKI  --  8.4*  --   --  8.3*  --  9.1   GLC  --  105*  --   --  117*  --  117*   TROPI  --   --   --   --   --   --  <0.015   < > = values in this interval not displayed.        Imaging:      Imaging data reviewed.     Dr. Donte Palma D.O.  [facility]  Pager [phone]

## 2018-08-20 NOTE — PROGRESS NOTES
[facility] Nurse Inpatient Adult Pressure Injury (PI) Assessment     Initial Assessment of PI(s) on pt's:   Left IT    Data:   Patient History:      per MD note(s): 89 year old female who was admitted on 2018 after presenting with significant wekaness.       Acute on chronic lower extremity weakness: Suspect multifactorial with neuropathy and hyponatremia as well as advanced age.  Patient has had progressive weakness, worsening over the past months, but with acute change day of admission to the point where she could not get out of bed. She has been evaluated by neurology with recent EMGs and follow-up scheduled for early this coming week.  Noted to have hyponatremia, worse than prior, which is likely contributing.  Family has been looking into increased level of care as patient currently resides in a senior living complex. She notes improvement today after receiving IVF and is able to ambulate though requires nursing assistance due to significant unsteadiness. She has no focal weakness on exam.   - PT following.   - Follow up with Neurology as scheduled to review EMG results     Anthony Assessment and sub scores:   Anthony Score  Av.8  Min: 17  Max: 18    Positioning: Pillows,     Mattress:  Standard , Atmos Air mattress    Moisture Management:  Incontinence Protocol and Diaper    Catheter secured? Not applicable    Current Diet / Nutrition:           Active Diet Order      Regular Diet Adult      Labs:   Recent Labs   Lab Test  18   0806   17   0808  10/26/16   0949   03/19/15   1248   ALBUMIN   --    --    --    --    --   4.0   HGB  11.9   < >  12.7   --    < >   --    INR   --    --   1.07   --    --    --    WBC  7.2   < >  7.3   --    < >   --    A1C   --    --    --   5.5   < >  5.7    < > = values in this interval not displayed.                                                                                                                          Pressure Injury  "Assessment  (location):   Right IT    Wound History:   Community acquired PI, pt incontinent of urine   Moist red granulation tissue with ring of white maceration    Specific Dimensions (length x width x depth, in cm) :   1.4cm x 2.1cm x 0.2cm    Tunneling:  N/A    Undermining: N/A    Palpation of the wound bed:  normal    Slough appearance:  none    Eschar appearance:  none    Periwound Skin: intact, edema and maceration,      Color: white maceration    Temperature  normal     Drainage:  Small serous         Odor: none    Pain:  minimal          Intervention:     Patient's chart evaluated.      Anthony Interventions:  Current Anthony Interventions and Care Plan reviewed and updated, appropriate at this time.    Assessed wound on buttocks with the assistance of the RN    Orders  Written    Supplies  reviewed    Discussed plan of care with Patient and Nurse    All patient / family questions answered:  YES           Assessment:     Pressure Injury (PI) located on right IT: stage 2- POA, small wound, no obvious s/s of infection.  Because pt is frequently wet due to incontinence will just use barrier cream, powder and PIP measures to help heal and prevent further injury vs trying to keep a dressing on site.          Plan:     Nursing to notify the Provider(s) and re-consult the [facility] Nurse if wound(s) deteriorate(s)or if the wound care plan needs reevaluation.    If pt is refusing to turn or reposition they must be educated on the  potential injury from not off loading pressure.  Then this \"educated refusal\" needs to be documented as an \"educated refusal to turn/ reposition\" and document if alert, etc.  Plan for wound care to wound located on right IT: every 4 hours and prn    1. Clean skin with Adama Cleanse and Protect    2. Apply CriticAid Paste to areas needing protection, especially the right IT wound.    3. Dust with baby powder    Pressure INJURY Prevention Measures (PIP):  If pt is refusing to turn or reposition they " "must be educated on the  potential injury from not off loading pressure.  Then this \"educated refusal\" needs to be documented as an \"educated refusal to turn/ reposition\" and document if alert, etc.    1. Repositioning:  Pt must be repositioned for good skin health.  If pt refusing or this is not being done then the charge nurse, nurse manager and md need to be notified to help intervene and to know that there is an issue    Bed:  Reposition pt MINIMALLY every 1-2 hours in bed to relieve pressure and promote perfusion to tissue. Also try to position to get airflow to pt s backside, etc. If necessary consider use of Fluidized Positioner Pads ((387018 small/ 647212 med/ [id number] large) to help maintain pt in good offloading-position.               Use pillows in the lower back and upper thighs to support postioning but keep pillows off butt to minimize any pressure.    Chair:  When up to chair pt should not sit for longer than one hour total before either standing or returning to bed for at least 10 minutes, again to relieve pressure and promote perfusion to tissue.     o Pt should also sit on a chair cushion (#232461) when up to the chair.  o Do NOT use a donut to sit on.  This concentrates pressure in a smaller area and creates a higher potential for injury where the cushion is.   o When pt returns to bed he/she should be positioned on side  o Do not sit on a Z-Flow Pad.  This is not a chair cushion, it is for positioning  If pt is refusing to turn or reposition they must be educated on the  potential injury from not off loading pressure.  Then this \"educated refusal\" needs to be documented as an \"educated refusal to turn/ reposition\" and document if alert, etc.  2.  Patient's skin must be kept clean and dry- the areas are only clean when you see the base of the skin fold, especially the perineal area.  Moist, macerated tissue is more susceptible to injury.           Follow Incontinence Protocol found in [employer] Facts.  " Dust with baby powder BID to help with chaffing, decrease warmth from friction and increase comfort.         NO BRIEFS WITH CATHETERS  3.  Follow the bed algorithm to consider a specialty mattress  4.  If able keep head of bed below 30 degrees.   5.  Follow Anthony Risk recommendations  6.  Be aware of the bony prominences!    Elevate heels at all times, consider using heel lift boots, Chung or Rooke    Apply skin prep to bony prominences such as elbows, heels, hips- and consider wearing long sleeves and/or pants at al times  7.  Remember to perform full skin inspection 2 x / day- unless ordered NOT to order an area skin must be assessed.  If not able to do a full skin inspection then document full inspection along with the exception of what was not assessed.             WOC Nurse will return: weekly and prn

## 2018-08-21 VITALS
BODY MASS INDEX: 27.93 KG/M2 | RESPIRATION RATE: 16 BRPM | OXYGEN SATURATION: 96 % | HEART RATE: 76 BPM | TEMPERATURE: 98 F | SYSTOLIC BLOOD PRESSURE: 139 MMHG | HEIGHT: 63 IN | WEIGHT: 157.63 LBS | DIASTOLIC BLOOD PRESSURE: 69 MMHG

## 2018-08-21 LAB
ANION GAP SERPL CALCULATED.3IONS-SCNC: 8 MMOL/L (ref 3–14)
BUN SERPL-MCNC: 16 MG/DL (ref 7–30)
CALCIUM SERPL-MCNC: 9.3 MG/DL (ref 8.5–10.1)
CHLORIDE SERPL-SCNC: 101 MMOL/L (ref 94–109)
CO2 SERPL-SCNC: 26 MMOL/L (ref 20–32)
CREAT SERPL-MCNC: 0.75 MG/DL (ref 0.52–1.04)
GFR SERPL CREATININE-BSD FRML MDRD: 72 ML/MIN/1.7M2
GLUCOSE SERPL-MCNC: 118 MG/DL (ref 70–99)
POTASSIUM SERPL-SCNC: 4.2 MMOL/L (ref 3.4–5.3)
SODIUM SERPL-SCNC: 135 MMOL/L (ref 133–144)

## 2018-08-21 PROCEDURE — 36415 COLL VENOUS BLD VENIPUNCTURE: CPT | Performed by: HOSPITALIST

## 2018-08-21 PROCEDURE — 80048 BASIC METABOLIC PNL TOTAL CA: CPT | Performed by: HOSPITALIST

## 2018-08-21 PROCEDURE — A9270 NON-COVERED ITEM OR SERVICE: HCPCS | Mod: GY | Performed by: HOSPITALIST

## 2018-08-21 PROCEDURE — 25000132 ZZH RX MED GY IP 250 OP 250 PS 637: Mod: GY | Performed by: HOSPITALIST

## 2018-08-21 PROCEDURE — A9270 NON-COVERED ITEM OR SERVICE: HCPCS | Mod: GY | Performed by: INTERNAL MEDICINE

## 2018-08-21 PROCEDURE — 25000132 ZZH RX MED GY IP 250 OP 250 PS 637: Mod: GY | Performed by: INTERNAL MEDICINE

## 2018-08-21 PROCEDURE — 99239 HOSP IP/OBS DSCHRG MGMT >30: CPT | Performed by: HOSPITALIST

## 2018-08-21 RX ORDER — GUAIFENESIN 600 MG/1
600 TABLET, EXTENDED RELEASE ORAL 2 TIMES DAILY
Status: DISCONTINUED | OUTPATIENT
Start: 2018-08-21 | End: 2018-08-21 | Stop reason: HOSPADM

## 2018-08-21 RX ORDER — GUAIFENESIN/DEXTROMETHORPHAN 100-10MG/5
5 SYRUP ORAL EVERY 4 HOURS PRN
Qty: 560 ML | Refills: 0 | Status: SHIPPED | OUTPATIENT
Start: 2018-08-21 | End: 2024-01-01

## 2018-08-21 RX ORDER — FUROSEMIDE 40 MG
40 TABLET ORAL DAILY
Qty: 180 TABLET | Refills: 1 | Status: SHIPPED | OUTPATIENT
Start: 2018-08-21 | End: 2024-01-01

## 2018-08-21 RX ORDER — LISINOPRIL 20 MG/1
20 TABLET ORAL 2 TIMES DAILY
Qty: 30 TABLET | Refills: 0 | Status: SHIPPED | OUTPATIENT
Start: 2018-08-21 | End: 2024-01-01

## 2018-08-21 RX ORDER — ACETAMINOPHEN 325 MG/1
650 TABLET ORAL EVERY 4 HOURS PRN
Qty: 30 TABLET | Refills: 0 | Status: SHIPPED | OUTPATIENT
Start: 2018-08-21 | End: 2024-01-01

## 2018-08-21 RX ADMIN — GUAIFENESIN 600 MG: 600 TABLET, EXTENDED RELEASE ORAL at 11:21

## 2018-08-21 RX ADMIN — TIMOLOL MALEATE 1 DROP: 5 SOLUTION OPHTHALMIC at 08:43

## 2018-08-21 RX ADMIN — GABAPENTIN 100 MG: 100 CAPSULE ORAL at 11:22

## 2018-08-21 RX ADMIN — SENNOSIDES AND DOCUSATE SODIUM 2 TABLET: 8.6; 5 TABLET ORAL at 11:21

## 2018-08-21 RX ADMIN — LISINOPRIL 20 MG: 20 TABLET ORAL at 08:43

## 2018-08-21 RX ADMIN — GABAPENTIN 200 MG: 100 CAPSULE ORAL at 08:43

## 2018-08-21 RX ADMIN — FLUTICASONE PROPIONATE 2 SPRAY: 50 SPRAY, METERED NASAL at 08:43

## 2018-08-21 RX ADMIN — OMEPRAZOLE 20 MG: 20 CAPSULE, DELAYED RELEASE ORAL at 06:43

## 2018-08-21 ASSESSMENT — ACTIVITIES OF DAILY LIVING (ADL)
ADLS_ACUITY_SCORE: 21
ADLS_ACUITY_SCORE: 19
ADLS_ACUITY_SCORE: 21
ADLS_ACUITY_SCORE: 19

## 2018-08-21 NOTE — PROGRESS NOTES
SW:  D: Patient has discharge orders for today.  Patient now has had a qualifying hospital stay thus her TCU stay will be covered by her Medicare SNF benefit.    Patient will be transferring to [facility] and upon discharge from the TCU she will move into an apartment at [facility].  Daughter, Denise,  will be transporting patient today.  Orders have been faxed. Arrangements reviewed with patient.  PAS-RR    D: Per DHS regulation, SW completed and submitted PAS-RR to [place] [employer] Direct Connect via the Senior LinkAge Line.  PAS-RR confirmation # is : [id number]    I: SW spoke with patient and they are aware a PAS-RR has been submitted.  SW reviewed with patient that they may be contacted for a follow up appointment within 10 days of hospital discharge if their SNF stay is < 30 days.  Contact information for [facility] LinkAge Line was also provided.    A: Patient verbalized understanding.    P: Further questions may be directed to [facility] LinkAge Line at #[phone], option #4 for PAS-RR staff.

## 2018-08-21 NOTE — DISCHARGE SUMMARY
[facility] [place]    Discharge Summary  Hospitalist    Date of Admission:  8/17/2018  Date of Discharge:  8/21/2018  Discharging Provider: Donte Palma  Date of Service (when I saw the patient): 08/21/18    History of Present Illness   Delmis Quarles is a 89 year old female who presents with weakness limiting her ability to get out of bed this morning.  Patient is a 89-year-old female who lives independently in a senior living complex.  Receives assistance from her daughter intermittently, though family has been looking into an increased level of care for the patient over the past months. History of intermittent falls. Patient actually has an assisted living apartment reserved at [facility] with a plan to move in the next 1-2 weeks after room has been repainted/refinished.     Patient presents to the emergency department today as she felt too weak to ambulate.  Over the past week, patient has had an upper respiratory illness which was initially precipitated by nasal congestion and some postnasal drip.  Nasal symptoms have resolved, the patient now with a cough.  Was given some anti-infectives by her primary care group, [facility] physicians, though I am unclear as to what antibiotic this was.  Subsequently a chest x-ray was performed which was negative, and anti-infectives were discontinued as of 8/17/18.  Given patient was too weak to ambulate and currently living independently, patient's family brought her to the emergency department where laboratory analysis was remarkable for hypochloremic hyponatremia with hyponatremia to 124.  History of hyponatremia has been noted over the past year, though this is more notable.  Patient remains on Bumex daily, and over the past week, with a respiratory illness, patient describes eating less at her noon meal.  Has not been losing weight, though is not a big eater overall.  Patient actually has restricted herself somewhat given her concerns  for developing diabetes despite her age of 89.     Patient has a history of neuropathy involving bilateral lower extremities as well as distal upper extremities right greater than left.  No diabetes, though appears to have some prediabetes.  Follows with [facility], [facility] and actually underwent EMG testing recently with follow-up early next week to discuss findings/results.  Unfortunately, these results are not available/scanned in through our system at this time.     A CT of the chest was performed in the emergency department given patient's rattling cough.  Noted to have some bronchial thickening consistent with bronchitis with some mucus, no consolidation/pneumonia appreciated.  Incidentally found to have a right renal cyst/mass.  This was discussed with patient and family as above.       Hospital Course   Delmis Quarles was admitted on 8/17/2018.  The following problems were addressed during her hospitalization:    Delmis Quarles is a 89 year old female who was admitted on 8/17/2018 after presenting with significant wekaness.       Acute on chronic lower extremity weakness: Suspect multifactorial with neuropathy and hyponatremia as well as advanced age.  Patient has had progressive weakness, worsening over the past months, but with acute change day of admission to the point where she could not get out of bed. She has been evaluated by neurology with recent EMGs and follow-up scheduled for early this coming week.  Noted to have hyponatremia, worse than prior, which is likely contributing.  Family has been looking into increased level of care as patient currently resides in a senior living complex. She notes improvement today after receiving IVF and is able to ambulate though requires nursing assistance due to significant unsteadiness. She has no focal weakness on exam.   - PT following.   - Follow up with Neurology as scheduled to review EMG results      Hypochloremic hyponatremia: Serum  sodium of 124 on admission.  Suspect contribution from both loop diuretic as well as poor oral intake.  Note that patient has been mildly hyponatremic over the past year as well, remains on Bumex for blood pressure control and lower extremity edema.  - Diet as tolerated  - Hold bumex and spironolactone at this time.       Bronchitis: Recently prescribed anti-infectives through her primary care provider for possible pneumonia.  Chest x-ray without evidence of pneumonia in outpatient setting, CT of chest with some findings of bronchitis, no pneumonia.    - Guaifenesin prn.       Neuropathy: Suspect this is also contributing to patient's sensation of weakness.     - Continue prior to admission gabapentin      Vaginal dryness: Stable.   - Continue prior to admission estradiol suppository      History of gastric ulcer, esophageal stricture: Stable.   - Continue prior to admission omeprazole      Right renal mass: Incidental finding of 1.7 cm indeterminant right renal cyst on CT of chest 8/17/18.  Findings discussed with patient as well as daughter and son-in-law at bedside on admission, per report.  In the setting of advanced age and desire for no further surgical intervention, at this time they are opting not to pursue further diagnostic imaging.  Aware of finding, and may consider in the future.  This decision making process is consistent with recent decision not to pursue further mammograms.  - Recommend reassessment of decision to pursue diagnostic imaging by primary care physician assistant following discharge      Pending Results   These results will be followed up by PCP  Unresulted Labs Ordered in the Past 30 Days of this Admission     No orders found from 6/18/2018 to 8/18/2018.          Code Status   DNR / DNI       Primary Care Physician   Sherry Monteiro    Physical Exam   Temp: 98  F (36.7  C) Temp src: Oral BP: 139/69 Pulse: 76   Resp: 16 SpO2: 96 % O2 Device: None (Room air)    Vitals:    08/17/18 2358  08/20/18 0654 08/21/18 0600   Weight: 69.9 kg (154 lb 3.2 oz) 67.8 kg (149 lb 7.6 oz) 71.5 kg (157 lb 10.1 oz)     Vital Signs with Ranges  Temp:  [97.7  F (36.5  C)-98  F (36.7  C)] 98  F (36.7  C)  Pulse:  [76-77] 76  Resp:  [16] 16  BP: (107-139)/(54-69) 139/69  SpO2:  [93 %-96 %] 96 %  I/O last 3 completed shifts:  In: 820 [P.O.:820]  Out: -     GENERAL: Alert. NAD. Conversational, appropriate.   HEENT: Normocephalic. EOMI. No icterus or injection. Nares normal.   LUNGS: Decrement to bases, non-productive cough. No dyspnea at rest.   HEART: Regular rate. Extremities perfused.   ABDOMEN: Soft, nontender, and nondistended. Positive bowel sounds.   EXTREMITIES: LE edema noted.   NEUROLOGIC: Moves extremities x4. Follows commands.     Discharge Disposition   Discharged to TCU  Condition at discharge: Stable    Consultations This Hospital Stay   SOCIAL WORK IP CONSULT  WOUND OSTOMY CONTINENCE NURSE  IP CONSULT  PHYSICAL THERAPY ADULT IP CONSULT  PHYSICAL THERAPY ADULT IP CONSULT  OCCUPATIONAL THERAPY ADULT IP CONSULT    Time Spent on this Encounter   I, Donte Palma, personally saw the patient today and spent greater than 30 minutes discharging this patient.    Discharge Orders     General info for SNF   Length of Stay Estimate: Short Term Care: Estimated # of Days <30  Condition at Discharge: Improving  Level of care:skilled   Rehabilitation Potential: Excellent  Admission H&P remains valid and up-to-date: Yes  Recent Chemotherapy: N/A  Use Nursing Home Standing Orders: Yes     Mantoux instructions   Give two-step Mantoux (PPD) Per Facility Policy Yes     Reason for your hospital stay   Low sodium level     Activity - Up with nursing assistance     DNR/DNI     Physical Therapy Adult Consult   Evaluate and treat as clinically indicated.    Reason:  Physical deconditioning.     Occupational Therapy Adult Consult   Evaluate and treat as clinically indicated.    Reason: Physical deconditioning.     Fall  precautions     Advance Diet as Tolerated   Follow this diet upon discharge: Orders Placed This Encounter     Fluid restriction 1200 ML FLUID     Regular Diet Adult       Discharge Medications   Current Discharge Medication List      START taking these medications    Details   acetaminophen (TYLENOL) 325 MG tablet Take 2 tablets (650 mg) by mouth every 4 hours as needed for mild pain  Qty: 30 tablet, Refills: 0    Associated Diagnoses: Cough      furosemide (LASIX) 40 MG tablet Take 1 tablet (40 mg) by mouth daily  Qty: 180 tablet, Refills: 1    Associated Diagnoses: Cough      guaiFENesin-dextromethorphan (ROBITUSSIN DM) 100-10 MG/5ML syrup Take 5 mLs by mouth every 4 hours as needed for cough  Qty: 560 mL, Refills: 0    Associated Diagnoses: Cough      lisinopril (PRINIVIL/ZESTRIL) 20 MG tablet Take 1 tablet (20 mg) by mouth 2 times daily  Qty: 30 tablet, Refills: 0    Associated Diagnoses: Cough         CONTINUE these medications which have NOT CHANGED    Details   aspirin 81 MG EC tablet Take 1 tablet (81 mg) by mouth daily  Qty: 90 tablet, Refills: 3    Associated Diagnoses: Hypertension      Calcium Carbonate-Vitamin D (CALCIUM + D) 600-200 MG-UNIT per tablet Take 1 tablet by mouth 2 times daily       estradiol (VAGIFEM) 10 MCG TABS vaginal tablet INSERT 1 TABLET VAGINALLY TWICE WEEKLY  Qty: 24 tablet, Refills: 0    Associated Diagnoses: Atrophic vaginitis      fexofenadine (ALLEGRA) 180 MG tablet Take 180 mg by mouth daily as needed for allergies      fluticasone (FLONASE) 50 MCG/ACT spray INSTILL 2 SPRAYS INTO BOTH NOSTRILS ONCE DAILY  Qty: 16 mL, Refills: 0    Comments: Office visit needed  Associated Diagnoses: Post-nasal drainage      gabapentin (NEURONTIN) 100 MG capsule 200 mg in the morning and 100 mg at noon  200 mg in the evening  Qty: 450 capsule, Refills: 1    Associated Diagnoses: Neuropathy      latanoprost (XALATAN) 0.005 % ophthalmic solution Place 1 drop into both eyes At Bedtime        multivitamin, therapeutic with minerals (MULTI-VITAMIN) TABS tablet Take 1 tablet by mouth daily      omeprazole (PRILOSEC) 20 MG CR capsule TAKE 1 CAPSULE EVERY MORNING DAILY  Qty: 90 capsule, Refills: 0    Comments: Due for office visit for any further refills  Associated Diagnoses: Stricture and stenosis of esophagus      timolol (TIMOPTIC) 0.5 % ophthalmic solution Place 1 drop into both eyes daily      VITAMIN D 1000 UNIT PO CAPS 2 CAPSULE EVERY DAY  Refills: 0         STOP taking these medications       benazepril (LOTENSIN) 20 MG tablet Comments:   Reason for Stopping:         bumetanide (BUMEX) 1 MG tablet Comments:   Reason for Stopping:         spironolactone (ALDACTONE) 25 MG tablet Comments:   Reason for Stopping:             Allergies   Allergies   Allergen Reactions     Trospium Swelling     Lower extremity edema     Codeine      dry mouth and nausea     Penicillins      dry mouth     Sulfa Drugs      dry mouth     Data   Most Recent 3 CBC's:  Recent Labs   Lab Test  08/20/18   0806  08/17/18   1822  11/16/17   0808   WBC  7.2  8.7  7.3   HGB  11.9  12.4  12.7   MCV  89  88  91   PLT  248  222  192      Most Recent 3 BMP's:  Recent Labs   Lab Test  08/21/18   0828  08/19/18   1114  08/18/18   1812   08/18/18   0610   NA  135  132*  127*   < >  128*   POTASSIUM  4.2  4.8   --    --   4.2   CHLORIDE  101  99   --    --   94   CO2  26  28   --    --   26   BUN  16  17   --    --   21   CR  0.75  0.92   --    --   0.94   ANIONGAP  8  5   --    --   8   MAKI  9.3  8.4*   --    --   8.3*   GLC  118*  105*   --    --   117*    < > = values in this interval not displayed.     Most Recent 2 LFT's:  Recent Labs   Lab Test  03/19/15   1248  09/05/13   1142   AST  14  25   ALT  19  28   ALKPHOS  89  64   BILITOTAL  0.6  0.6     Most Recent INR's and Anticoagulation Dosing History:  Anticoagulation Dose History     Recent Dosing and Labs Latest Ref Rng & Units 5/9/2014 5/10/2014 5/11/2014 5/12/2014 5/13/2014  5/14/2014 11/16/2017    INR 0.86 - 1.14 1.28(H) 1.41(H) 1.41(H) 1.49(H) 1.46(H) 1.27(H) 1.07        Most Recent 3 Troponin's:  Recent Labs   Lab Test  08/17/18   1822  11/16/17   0823   TROPI  <0.015   --    TROPONIN   --   0.01     Most Recent Cholesterol Panel:  Recent Labs   Lab Test  03/19/15   1248   CHOL  142   LDL  69   HDL  58   TRIG  76     Most Recent 6 Bacteria Isolates From Any Culture (See EPIC Reports for Culture Details):  Recent Labs   Lab Test  11/16/17   0836  11/16/17   0826  02/19/16   1253  05/30/15   1200  04/30/14   1305  07/22/11   1539   CULT  No growth  No growth  10,000 to 50,000 colonies/mL mixed urogenital cristine Susceptibility testing not   routinely done    No Salmonella, Shigella, Campylobacter, E. coli O157, Aeromonas, or Plesiomonas   isolated.    10,000 to 50,000 colonies/mL Mixed gram positive cristine  Multiple species present, probable perineal contamination.  Susceptibility testing not routinely done    Normal skin cristine No Pathogens Isolated     Most Recent TSH, T4 and A1c Labs:  Recent Labs   Lab Test  08/17/18   1822   10/26/16   0949   TSH  6.57*   < >   --    T4  1.35   --    --    A1C   --    --   5.5    < > = values in this interval not displayed.     Results for orders placed or performed during the hospital encounter of 08/17/18   CT Chest Pulmonary Embolism w Contrast    Narrative    CT CHEST PULMONARY EMBOLISM WITH CONTRAST 8/17/2018 8:10 PM     HISTORY: Weakness, cough for one week. Negative chest x-ray. Concern  for pneumonia versus pulmonary embolism.      TECHNIQUE: Volumetric acquisition of the chest  after the  administration of 73ml Isovue-370 IV contrast. Radiation dose for this  scan was reduced using automated exposure control, adjustment of the  mA and/or kV according to patient size, or iterative reconstruction  technique.     COMPARISON: 10/18/2005 and 10/16/2006    FINDINGS: No visualized pulmonary embolism. Normal heart size.  Coronary artery  calcifications. Mild atheromatous changes of the  thoracic aorta. Two tiny left upper lung nodules are stable and  considered benign. Mild bronchial wall thickening in the left lower  lung with some mucoid impaction and minimal bronchiectasis. No  consolidative infiltrates, pleural effusions or other acute findings.  No enlarged mediastinal or hilar lymph nodes.    Two right renal cysts measuring up to 3.4 cm. Partially visualized 1.7  cm lesion at the lateral aspect of the right mid kidney could be a  normal lobulation or an indeterminant lesion either a hemorrhagic cyst  or possibly a small solid lesion. This could be better evaluated with  follow-up outpatient CT or MRI, if clinically indicated.      Impression    IMPRESSION:  1. Slight bronchial wall thickening with minimal bronchiectasis and  some mucoid impaction in the left lower lung.  2. No visualized pulmonary was more other acute findings in the chest.  3. Possible 1.7 cm right renal lesion partially visualized. This is  indeterminate. Possibilities include some normal cortical lobulation  versus a hemorrhagic cyst or possibly a small solid lesion. If  clinically indicated, follow-up outpatient contrast-enhanced CT or MRI  could be performed.    SEBASTIAN DAVIS MD

## 2018-08-21 NOTE — PLAN OF CARE
Problem: Patient Care Overview  Goal: Plan of Care/Patient Progress Review  Outcome: No Change  A/O x4. VSS on RA. No pain. Turned/repositioned q2 hrs. Incontinent of urine. Regular diet- tolerating well. Last Na 132. Rechecking in am then possible discharge in afternoon if patient remains stable. Son in law Kt said that he would be able to assist with transportation and will be here tomorrow late morning. IV SL. Will continue to monitor.

## 2018-08-21 NOTE — PROGRESS NOTES
Discharge    Patient discharged to care facility via car with daughter and son-in-law  Care plan note  A&Ox4. VSS on RA. Denies pain. Productive cough- Mucinex given. Denies SOB. Wheezing in RLL. Regular diet good appetite. FR of 122 mL, . Escorted off unit via wheelchair at approximately 1430.    Listed belongings gathered and returned to patient. Yes  Care Plan and Patient education resolved: Yes  Prescriptions if needed, hard copies sent with patient  NA  Home and hospital acquired medications returned to patient: NA  Medication Bin checked and emptied on discharge Yes  Follow up appointment made for patient: No

## 2018-08-21 NOTE — PLAN OF CARE
Problem: Patient Care Overview  Goal: Plan of Care/Patient Progress Review  Outcome: Improving  A&Ox4, DNR/DNI. Up with 2, Gb and walker. Regular diet with 1200 ml fluid restriction. Last Na 132, recheck in AM. Turn and repo q2, incontinent. Frequent congested cough when awake, PRN robitussin available. LS coarse. Blanchable redness to bottom; rash in perineal area, bottom and upper thighs. IV saline locked. Will continue to monitor.

## 2018-08-23 ENCOUNTER — TELEPHONE (OUTPATIENT)
Dept: FAMILY MEDICINE | Facility: CLINIC | Age: 83
End: 2018-08-23

## 2018-08-29 ENCOUNTER — RECORDS - HEALTHEAST (OUTPATIENT)
Dept: LAB | Facility: CLINIC | Age: 83
End: 2018-08-29

## 2018-08-30 LAB
ALBUMIN SERPL-MCNC: 3.1 G/DL (ref 3.5–5)
ALP SERPL-CCNC: 54 U/L (ref 45–120)
ALT SERPL W P-5'-P-CCNC: <9 U/L (ref 0–45)
ANION GAP SERPL CALCULATED.3IONS-SCNC: 10 MMOL/L (ref 5–18)
AST SERPL W P-5'-P-CCNC: 12 U/L (ref 0–40)
BILIRUB SERPL-MCNC: 0.5 MG/DL (ref 0–1)
BUN SERPL-MCNC: 40 MG/DL (ref 8–28)
CALCIUM SERPL-MCNC: 9.6 MG/DL (ref 8.5–10.5)
CHLORIDE BLD-SCNC: 100 MMOL/L (ref 98–107)
CO2 SERPL-SCNC: 29 MMOL/L (ref 22–31)
CREAT SERPL-MCNC: 1.33 MG/DL (ref 0.6–1.1)
GFR SERPL CREATININE-BSD FRML MDRD: 37 ML/MIN/1.73M2
GLUCOSE BLD-MCNC: 101 MG/DL (ref 70–125)
POTASSIUM BLD-SCNC: 4.3 MMOL/L (ref 3.5–5)
PROT SERPL-MCNC: 6.2 G/DL (ref 6–8)
SODIUM SERPL-SCNC: 139 MMOL/L (ref 136–145)
TSH SERPL DL<=0.005 MIU/L-ACNC: 1.83 UIU/ML (ref 0.3–5)
VIT B12 SERPL-MCNC: 1986 PG/ML (ref 213–816)

## 2018-09-05 ENCOUNTER — RECORDS - HEALTHEAST (OUTPATIENT)
Dept: LAB | Facility: CLINIC | Age: 83
End: 2018-09-05

## 2018-09-06 LAB
ANION GAP SERPL CALCULATED.3IONS-SCNC: 7 MMOL/L (ref 5–18)
BUN SERPL-MCNC: 21 MG/DL (ref 8–28)
CALCIUM SERPL-MCNC: 9.8 MG/DL (ref 8.5–10.5)
CHLORIDE BLD-SCNC: 103 MMOL/L (ref 98–107)
CO2 SERPL-SCNC: 29 MMOL/L (ref 22–31)
CREAT SERPL-MCNC: 0.85 MG/DL (ref 0.6–1.1)
GFR SERPL CREATININE-BSD FRML MDRD: >60 ML/MIN/1.73M2
GLUCOSE BLD-MCNC: 101 MG/DL (ref 70–125)
POTASSIUM BLD-SCNC: 4.1 MMOL/L (ref 3.5–5)
SODIUM SERPL-SCNC: 139 MMOL/L (ref 136–145)

## 2018-10-17 ENCOUNTER — RECORDS - HEALTHEAST (OUTPATIENT)
Dept: LAB | Facility: CLINIC | Age: 83
End: 2018-10-17

## 2018-10-17 LAB
ALBUMIN UR-MCNC: NEGATIVE MG/DL
APPEARANCE UR: ABNORMAL
BACTERIA #/AREA URNS HPF: ABNORMAL HPF
BILIRUB UR QL STRIP: NEGATIVE
COLOR UR AUTO: YELLOW
GLUCOSE UR STRIP-MCNC: NEGATIVE MG/DL
HGB UR QL STRIP: NEGATIVE
KETONES UR STRIP-MCNC: NEGATIVE MG/DL
LEUKOCYTE ESTERASE UR QL STRIP: ABNORMAL
NITRATE UR QL: POSITIVE
PH UR STRIP: 5.5 [PH] (ref 4.5–8)
RBC #/AREA URNS AUTO: ABNORMAL HPF
SP GR UR STRIP: 1.01 (ref 1–1.03)
SQUAMOUS #/AREA URNS AUTO: ABNORMAL LPF
UROBILINOGEN UR STRIP-ACNC: ABNORMAL
WBC #/AREA URNS AUTO: >100 HPF
WBC CLUMPS #/AREA URNS HPF: PRESENT /[HPF]

## 2018-10-19 LAB — BACTERIA SPEC CULT: ABNORMAL

## 2018-12-01 ENCOUNTER — APPOINTMENT (OUTPATIENT)
Dept: MRI IMAGING | Facility: CLINIC | Age: 83
DRG: 689 | End: 2018-12-01
Attending: EMERGENCY MEDICINE
Payer: MEDICARE

## 2018-12-01 ENCOUNTER — HOSPITAL ENCOUNTER (INPATIENT)
Facility: CLINIC | Age: 83
LOS: 1 days | Discharge: NURSING, REHAB OR LONG-TERM CARE | DRG: 689 | End: 2018-12-04
Attending: EMERGENCY MEDICINE | Admitting: HOSPITALIST
Payer: MEDICARE

## 2018-12-01 DIAGNOSIS — M62.81 GENERALIZED MUSCLE WEAKNESS: ICD-10-CM

## 2018-12-01 DIAGNOSIS — R42 DIZZINESS: ICD-10-CM

## 2018-12-01 DIAGNOSIS — J18.9 PNEUMONIA OF RIGHT LOWER LOBE DUE TO INFECTIOUS ORGANISM: Primary | ICD-10-CM

## 2018-12-01 PROBLEM — R53.1 WEAKNESS: Status: ACTIVE | Noted: 2018-12-01

## 2018-12-01 LAB
ALBUMIN UR-MCNC: NEGATIVE MG/DL
ANION GAP SERPL CALCULATED.3IONS-SCNC: 6 MMOL/L (ref 3–14)
APPEARANCE UR: CLEAR
BASOPHILS # BLD AUTO: 0.1 10E9/L (ref 0–0.2)
BASOPHILS NFR BLD AUTO: 0.7 %
BILIRUB UR QL STRIP: NEGATIVE
BUN SERPL-MCNC: 14 MG/DL (ref 7–30)
CALCIUM SERPL-MCNC: 9.2 MG/DL (ref 8.5–10.1)
CHLORIDE SERPL-SCNC: 104 MMOL/L (ref 94–109)
CO2 SERPL-SCNC: 29 MMOL/L (ref 20–32)
COLOR UR AUTO: YELLOW
CREAT SERPL-MCNC: 0.81 MG/DL (ref 0.52–1.04)
DIFFERENTIAL METHOD BLD: NORMAL
EOSINOPHIL # BLD AUTO: 0.1 10E9/L (ref 0–0.7)
EOSINOPHIL NFR BLD AUTO: 0.8 %
ERYTHROCYTE [DISTWIDTH] IN BLOOD BY AUTOMATED COUNT: 13.7 % (ref 10–15)
GFR SERPL CREATININE-BSD FRML MDRD: 66 ML/MIN/1.7M2
GLUCOSE SERPL-MCNC: 112 MG/DL (ref 70–99)
GLUCOSE UR STRIP-MCNC: NEGATIVE MG/DL
HCT VFR BLD AUTO: 42.7 % (ref 35–47)
HGB BLD-MCNC: 13.7 G/DL (ref 11.7–15.7)
HGB UR QL STRIP: NEGATIVE
IMM GRANULOCYTES # BLD: 0 10E9/L (ref 0–0.4)
IMM GRANULOCYTES NFR BLD: 0.5 %
KETONES UR STRIP-MCNC: 5 MG/DL
LEUKOCYTE ESTERASE UR QL STRIP: NEGATIVE
LYMPHOCYTES # BLD AUTO: 1.1 10E9/L (ref 0.8–5.3)
LYMPHOCYTES NFR BLD AUTO: 11.9 %
MAGNESIUM SERPL-MCNC: 1.8 MG/DL (ref 1.6–2.3)
MCH RBC QN AUTO: 29.8 PG (ref 26.5–33)
MCHC RBC AUTO-ENTMCNC: 32.1 G/DL (ref 31.5–36.5)
MCV RBC AUTO: 93 FL (ref 78–100)
MONOCYTES # BLD AUTO: 0.7 10E9/L (ref 0–1.3)
MONOCYTES NFR BLD AUTO: 7.8 %
MUCOUS THREADS #/AREA URNS LPF: PRESENT /LPF
NEUTROPHILS # BLD AUTO: 7 10E9/L (ref 1.6–8.3)
NEUTROPHILS NFR BLD AUTO: 78.3 %
NITRATE UR QL: NEGATIVE
NRBC # BLD AUTO: 0 10*3/UL
NRBC BLD AUTO-RTO: 0 /100
PH UR STRIP: 5 PH (ref 5–7)
PLATELET # BLD AUTO: 239 10E9/L (ref 150–450)
POTASSIUM SERPL-SCNC: 4 MMOL/L (ref 3.4–5.3)
RBC # BLD AUTO: 4.6 10E12/L (ref 3.8–5.2)
RBC #/AREA URNS AUTO: 1 /HPF (ref 0–2)
SODIUM SERPL-SCNC: 139 MMOL/L (ref 133–144)
SOURCE: ABNORMAL
SP GR UR STRIP: 1.01 (ref 1–1.03)
SQUAMOUS #/AREA URNS AUTO: 5 /HPF (ref 0–1)
TROPONIN I SERPL-MCNC: 0.03 UG/L (ref 0–0.04)
UROBILINOGEN UR STRIP-MCNC: 0 MG/DL (ref 0–2)
WBC # BLD AUTO: 8.9 10E9/L (ref 4–11)
WBC #/AREA URNS AUTO: <1 /HPF (ref 0–5)

## 2018-12-01 PROCEDURE — 80048 BASIC METABOLIC PNL TOTAL CA: CPT | Performed by: EMERGENCY MEDICINE

## 2018-12-01 PROCEDURE — 83735 ASSAY OF MAGNESIUM: CPT | Performed by: EMERGENCY MEDICINE

## 2018-12-01 PROCEDURE — 36415 COLL VENOUS BLD VENIPUNCTURE: CPT | Performed by: EMERGENCY MEDICINE

## 2018-12-01 PROCEDURE — A9585 GADOBUTROL INJECTION: HCPCS | Performed by: NURSE PRACTITIONER

## 2018-12-01 PROCEDURE — 85025 COMPLETE CBC W/AUTO DIFF WBC: CPT | Performed by: EMERGENCY MEDICINE

## 2018-12-01 PROCEDURE — 87186 SC STD MICRODIL/AGAR DIL: CPT | Performed by: EMERGENCY MEDICINE

## 2018-12-01 PROCEDURE — 25500064 ZZH RX 255 OP 636: Performed by: NURSE PRACTITIONER

## 2018-12-01 PROCEDURE — 70544 MR ANGIOGRAPHY HEAD W/O DYE: CPT

## 2018-12-01 PROCEDURE — 81001 URINALYSIS AUTO W/SCOPE: CPT | Performed by: EMERGENCY MEDICINE

## 2018-12-01 PROCEDURE — 70553 MRI BRAIN STEM W/O & W/DYE: CPT

## 2018-12-01 PROCEDURE — 87088 URINE BACTERIA CULTURE: CPT | Performed by: EMERGENCY MEDICINE

## 2018-12-01 PROCEDURE — 84484 ASSAY OF TROPONIN QUANT: CPT | Performed by: EMERGENCY MEDICINE

## 2018-12-01 PROCEDURE — 96372 THER/PROPH/DIAG INJ SC/IM: CPT

## 2018-12-01 PROCEDURE — 25000128 H RX IP 250 OP 636: Performed by: PHYSICIAN ASSISTANT

## 2018-12-01 PROCEDURE — 70549 MR ANGIOGRAPH NECK W/O&W/DYE: CPT

## 2018-12-01 PROCEDURE — 99220 ZZC INITIAL OBSERVATION CARE,LEVL III: CPT | Performed by: PHYSICIAN ASSISTANT

## 2018-12-01 PROCEDURE — 87086 URINE CULTURE/COLONY COUNT: CPT | Performed by: EMERGENCY MEDICINE

## 2018-12-01 PROCEDURE — G0378 HOSPITAL OBSERVATION PER HR: HCPCS

## 2018-12-01 PROCEDURE — 99285 EMERGENCY DEPT VISIT HI MDM: CPT | Mod: 25

## 2018-12-01 PROCEDURE — 93005 ELECTROCARDIOGRAM TRACING: CPT

## 2018-12-01 RX ORDER — ONDANSETRON 2 MG/ML
4 INJECTION INTRAMUSCULAR; INTRAVENOUS EVERY 6 HOURS PRN
Status: DISCONTINUED | OUTPATIENT
Start: 2018-12-01 | End: 2018-12-04 | Stop reason: HOSPADM

## 2018-12-01 RX ORDER — POLYETHYLENE GLYCOL 3350 17 G/17G
17 POWDER, FOR SOLUTION ORAL DAILY PRN
Status: DISCONTINUED | OUTPATIENT
Start: 2018-12-01 | End: 2018-12-02

## 2018-12-01 RX ORDER — AMOXICILLIN 250 MG
2 CAPSULE ORAL 2 TIMES DAILY PRN
Status: DISCONTINUED | OUTPATIENT
Start: 2018-12-01 | End: 2018-12-04 | Stop reason: HOSPADM

## 2018-12-01 RX ORDER — AMOXICILLIN 250 MG
1 CAPSULE ORAL 2 TIMES DAILY PRN
Status: DISCONTINUED | OUTPATIENT
Start: 2018-12-01 | End: 2018-12-04 | Stop reason: HOSPADM

## 2018-12-01 RX ORDER — LIDOCAINE 40 MG/G
CREAM TOPICAL
Status: DISCONTINUED | OUTPATIENT
Start: 2018-12-01 | End: 2018-12-02

## 2018-12-01 RX ORDER — NALOXONE HYDROCHLORIDE 0.4 MG/ML
.1-.4 INJECTION, SOLUTION INTRAMUSCULAR; INTRAVENOUS; SUBCUTANEOUS
Status: DISCONTINUED | OUTPATIENT
Start: 2018-12-01 | End: 2018-12-04 | Stop reason: HOSPADM

## 2018-12-01 RX ORDER — ONDANSETRON 4 MG/1
4 TABLET, ORALLY DISINTEGRATING ORAL EVERY 6 HOURS PRN
Status: DISCONTINUED | OUTPATIENT
Start: 2018-12-01 | End: 2018-12-04 | Stop reason: HOSPADM

## 2018-12-01 RX ORDER — ACETAMINOPHEN 650 MG/1
650 SUPPOSITORY RECTAL EVERY 4 HOURS PRN
Status: DISCONTINUED | OUTPATIENT
Start: 2018-12-01 | End: 2018-12-04 | Stop reason: HOSPADM

## 2018-12-01 RX ORDER — MULTIVIT-MIN/IRON/FOLIC ACID/K 18-600-40
2000 CAPSULE ORAL DAILY
COMMUNITY
End: 2024-01-01

## 2018-12-01 RX ORDER — GADOBUTROL 604.72 MG/ML
10 INJECTION INTRAVENOUS ONCE
Status: COMPLETED | OUTPATIENT
Start: 2018-12-01 | End: 2018-12-01

## 2018-12-01 RX ORDER — ACETAMINOPHEN 325 MG/1
650 TABLET ORAL EVERY 4 HOURS PRN
Status: DISCONTINUED | OUTPATIENT
Start: 2018-12-01 | End: 2018-12-04 | Stop reason: HOSPADM

## 2018-12-01 RX ORDER — SODIUM CHLORIDE, SODIUM LACTATE, POTASSIUM CHLORIDE, CALCIUM CHLORIDE 600; 310; 30; 20 MG/100ML; MG/100ML; MG/100ML; MG/100ML
INJECTION, SOLUTION INTRAVENOUS CONTINUOUS
Status: ACTIVE | OUTPATIENT
Start: 2018-12-01 | End: 2018-12-03

## 2018-12-01 RX ORDER — PROCHLORPERAZINE 25 MG
12.5 SUPPOSITORY, RECTAL RECTAL EVERY 12 HOURS PRN
Status: DISCONTINUED | OUTPATIENT
Start: 2018-12-01 | End: 2018-12-04 | Stop reason: HOSPADM

## 2018-12-01 RX ORDER — PROCHLORPERAZINE MALEATE 5 MG
5 TABLET ORAL EVERY 6 HOURS PRN
Status: DISCONTINUED | OUTPATIENT
Start: 2018-12-01 | End: 2018-12-04 | Stop reason: HOSPADM

## 2018-12-01 RX ADMIN — ENOXAPARIN SODIUM 40 MG: 40 INJECTION SUBCUTANEOUS at 21:20

## 2018-12-01 RX ADMIN — SODIUM CHLORIDE, POTASSIUM CHLORIDE, SODIUM LACTATE AND CALCIUM CHLORIDE: 600; 310; 30; 20 INJECTION, SOLUTION INTRAVENOUS at 21:20

## 2018-12-01 RX ADMIN — GADOBUTROL 10 ML: 604.72 INJECTION INTRAVENOUS at 17:49

## 2018-12-01 ASSESSMENT — ENCOUNTER SYMPTOMS
DIZZINESS: 1
NAUSEA: 1
SHORTNESS OF BREATH: 0
LIGHT-HEADEDNESS: 1
COUGH: 1
WEAKNESS: 1
VOMITING: 0
PALPITATIONS: 0
DIARRHEA: 0

## 2018-12-01 NOTE — IP AVS SNAPSHOT
MRN:[id number]                      After Visit Summary   12/1/2018    Delmis Quarles    MRN: [id number]           Thank you!     Thank you for choosing [facility] for your care. Our goal is always to provide you with excellent care. Hearing back from our patients is one way we can continue to improve our services. Please take a few minutes to complete the written survey that you may receive in the mail after you visit. If you would like to speak to someone directly about your visit please contact Patient Relations at [phone]. Thank you!          Patient Information     Date Of Birth          9/5/1924        Designated Caregiver       Most Recent Value    Caregiver    Will someone help with your care after discharge? no      About your hospital stay     You were admitted on:  December 1, 2018 You last received care in the:  [facility] Observation Department    You were discharged on:  December 4, 2018        Reason for your hospital stay       Weakness, urinary tract infection and Right lower lobe pneumonia seen on chest xray                  Who to Call     For medical emergencies, please call 911.  For non-urgent questions about your medical care, please call your primary care provider or clinic, [phone]          Attending Provider     Provider Specialty    Tyler Mosqueda DO Emergency Medicine    Wes Merino APRN [facility]    Joon Erazo MD Internal Medicine       Primary Care Provider Office Phone # Fax #    Sherry Monteiro PA-C [phone]      After Care Instructions     Activity - Up with nursing assistance           Advance Diet as Tolerated       Follow this diet upon discharge: Regular            Daily weights       Call Provider for weight gain of more than 2 pounds per day or 5 pounds per week.            Fall precautions           General info for SNF       Length of Stay Estimate: Long Term  Care  Condition at Discharge: Stable  Level of care:skilled   Rehabilitation Potential: Fair  Admission H&P remains valid and up-to-date: Yes  Recent Chemotherapy: N/A  Use Nursing Home Standing Orders: Yes            Mantoux instructions       Give two-step Mantoux (PPD) Per Facility Policy Yes                  Follow-up Appointments     Follow Up and recommended labs and tests       Follow up with Nursing home physician.  No follow up labs or test are needed.                  Additional Services     Occupational Therapy Adult Consult       Evaluate and treat as clinically indicated.    Reason:  weakness            Physical Therapy Adult Consult       Evaluate and treat as clinically indicated.    Reason:  weakness                   Follow-up Information     Follow up with [facility] ([facility]) .    Specialty:  Skilled Nursing Facility    Contact information:    [id number]  [place] [address] [phone]                Future tests that were ordered for you     AntiEmbolism Stockings       Bilateral below knee length.On in the morning, off at night                  Pending Results     Date and Time Order Name Status Description    12/3/2018 1435 Sputum Culture Aerobic Bacterial In process             Statement of Approval     Ordered          12/04/18 0923  I have reviewed and agree with all the recommendations and orders detailed in this document.  EFFECTIVE NOW     Approved and electronically signed by:  Joon Erazo MD           12/03/18 7027  I have reviewed and agree with all the recommendations and orders detailed in this document.  EFFECTIVE NOW     Approved and electronically signed by:  Lidya Quarles PA-C             Admission Information     Date & Time Provider Department Dept. Phone    12/1/2018 Joon Erazo MD [facility] Observation Department [phone]      Your Vitals Were     Blood Pressure Pulse Temperature Respirations  "Height Weight    145/63 (BP Location: Left arm) 100 98.3  F (36.8  C) (Oral) 20 1.626 m (5' 4\") 63.5 kg (140 lb)    Pulse Oximetry BMI (Body Mass Index)                96% 24.03 kg/m2          [employer] Information     [employer] lets you send messages to your doctor, view your test results, renew your prescriptions, schedule appointments and more. To sign up, go to [email or web address] . Click on \"Log in\" on the left side of the screen, which will take you to the Welcome page. Then click on \"Sign up Now\" on the right side of the page.     You will be asked to enter the access code listed below, as well as some personal information. Please follow the directions to create your username and password.     Your access code is: [email or web address]  Expires: 3/3/2019 11:16 AM     Your access code will  in 90 days. If you need help or a new code, please call your [facility] clinic or [phone].        Care EveryWhere ID     This is your Care EveryWhere ID. This could be used by other organizations to access your [facility] medical records  [id number]        Equal Access to Services     ELLIOT HALEY AH: Anthony Henry, waandreyda luhanna, qaybta kaalmada adeegyada, margareth kennedy. So [facility] [phone].    ATENCIÓN: Si habla español, tiene a mcdaniels disposición servicios gratuitos de asistencia lingüística. Llame al [phone].    We comply with applicable federal civil rights laws and [place] laws. We do not discriminate on the basis of race, color, national origin, age, disability, sex, sexual orientation, or gender identity.               Review of your medicines      START taking        Dose / Directions    azithromycin 250 MG tablet   Commonly known as:  ZITHROMAX   Indication:  Community Acquired Pneumonia        Dose:  250 mg   Start taking on:  2018   Take 1 tablet (250 mg) by mouth daily for 3 days   Quantity:  6 tablet   Refills:  0       benzonatate 100 MG capsule   Commonly " known as:  TESSALON        Dose:  100 mg   Take 1 capsule (100 mg) by mouth 3 times daily as needed for cough   Quantity:  42 capsule   Refills:  0       cefdinir 300 MG capsule   Commonly known as:  OMNICEF        Dose:  300 mg   Take 1 capsule (300 mg) by mouth 2 times daily for 8 days   Quantity:  16 capsule   Refills:  0       guaiFENesin 600 MG 12 hr tablet   Commonly known as:  MUCINEX        Dose:  600 mg   Take 1 tablet (600 mg) by mouth 2 times daily as needed for congestion   Quantity:  28 tablet   Refills:  0         CONTINUE these medicines which may have CHANGED, or have new prescriptions. If we are uncertain of the size of tablets/capsules you have at home, strength may be listed as something that might have changed.        Dose / Directions    aspirin 81 MG EC tablet   Commonly known as:  ASA   This may have changed:  when to take this   Used for:  Hypertension        Dose:  81 mg   Take 1 tablet (81 mg) by mouth daily   Quantity:  90 tablet   Refills:  3       estradiol 10 MCG Tabs vaginal tablet   Commonly known as:  VAGIFEM   This may have changed:  See the new instructions.   Used for:  Atrophic vaginitis        INSERT 1 TABLET VAGINALLY TWICE WEEKLY   Quantity:  24 tablet   Refills:  0         CONTINUE these medicines which have NOT CHANGED        Dose / Directions    acetaminophen 325 MG tablet   Commonly known as:  TYLENOL   Used for:  Cough        Dose:  650 mg   Take 2 tablets (650 mg) by mouth every 4 hours as needed for mild pain   Quantity:  30 tablet   Refills:  0       calcium + D 600-200 MG-UNIT Tabs   Generic drug:  calcium carbonate-vitamin D        Dose:  1 tablet   Take 1 tablet by mouth 2 times daily   Refills:  0       fexofenadine 180 MG tablet   Commonly known as:  ALLEGRA        Dose:  180 mg   Take 180 mg by mouth daily as needed for allergies   Refills:  0       fluticasone 50 MCG/ACT nasal spray   Commonly known as:  FLONASE   Used for:  Post-nasal drainage        INSTILL 2  SPRAYS INTO BOTH NOSTRILS ONCE DAILY   Quantity:  16 mL   Refills:  0       furosemide 40 MG tablet   Commonly known as:  LASIX   Used for:  Cough        Dose:  40 mg   Take 1 tablet (40 mg) by mouth daily   Quantity:  180 tablet   Refills:  1       gabapentin 100 MG capsule   Commonly known as:  NEURONTIN   Used for:  Neuropathy        200 mg in the morning and 100 mg at noon  200 mg in the evening   Quantity:  450 capsule   Refills:  1       guaiFENesin-dextromethorphan 100-10 MG/5ML syrup   Commonly known as:  ROBITUSSIN DM   Used for:  Cough        Dose:  5 mL   Take 5 mLs by mouth every 4 hours as needed for cough   Quantity:  560 mL   Refills:  0       latanoprost 0.005 % ophthalmic solution   Commonly known as:  XALATAN        Dose:  1 drop   Place 1 drop into both eyes At Bedtime   Refills:  0       lisinopril 20 MG tablet   Commonly known as:  PRINIVIL/ZESTRIL   Used for:  Cough        Dose:  20 mg   Take 1 tablet (20 mg) by mouth 2 times daily   Quantity:  30 tablet   Refills:  0       Multi-vitamin tablet        Dose:  1 tablet   Take 1 tablet by mouth daily   Refills:  0       omeprazole 20 MG DR capsule   Commonly known as:  priLOSEC   Used for:  Stricture and stenosis of esophagus        TAKE 1 CAPSULE EVERY MORNING DAILY   Quantity:  90 capsule   Refills:  0       timolol maleate 0.5 % ophthalmic solution   Commonly known as:  TIMOPTIC        Dose:  1 drop   Place 1 drop into both eyes daily   Refills:  0       Vitamin D (Cholecalciferol) 1000 units Tabs        Dose:  2000 Units   Take 2,000 Units by mouth daily   Refills:  0            Where to get your medicines      Some of these will need a paper prescription and others can be bought over the counter. Ask your nurse if you have questions.     You don't need a prescription for these medications     azithromycin 250 MG tablet    benzonatate 100 MG capsule    cefdinir 300 MG capsule    guaiFENesin 600 MG 12 hr tablet                Protect others  around you: Learn how to safely use, store and throw away your medicines at www.disposemymeds.org.        ANTIBIOTIC INSTRUCTION     You've Been Prescribed an Antibiotic - Now What?  Your healthcare team thinks that you or your loved one might have an infection. Some infections can be treated with antibiotics, which are powerful, life-saving drugs. Like all medications, antibiotics have side effects and should only be used when necessary. There are some important things you should know about your antibiotic treatment.      Your healthcare team may run tests before you start taking an antibiotic.    Your team may take samples (e.g., from your blood, urine or other areas) to run tests to look for bacteria. These test can be important to determine if you need an antibiotic at all and, if you do, which antibiotic will work best.      Within a few days, your healthcare team might change or even stop your antibiotic.    Your team may start you on an antibiotic while they are working to find out what is making you sick.    Your team might change your antibiotic because test results show that a different antibiotic would be better to treat your infection.    In some cases, once your team has more information, they learn that you do not need an antibiotic at all. They may find out that you don't have an infection, or that the antibiotic you're taking won't work against your infection. For example, an infection caused by a virus can't be treated with antibiotics. Staying on an antibiotic when you don't need it is more likely to be harmful than helpful.      You may experience side effects from your antibiotic.    Like all medications, antibiotics have side effects. Some of these can be serious.    Let you healthcare team know if you have any known allergies when you are admitted to the hospital.    One significant side effect of nearly all antibiotics is the risk of severe and sometimes deadly diarrhea caused by Clostridium  difficile (C. Difficile). This occurs when a person takes antibiotics because some good germs are destroyed. Antibiotic use allows C. diificile to take over, putting patients at high risk for this serious infection.    As a patient or caregiver, it is important to understand your or your loved one's antibiotic treatment. It is especially important for caregivers to speak up when patients can't speak for themselves. Here are some important questions to ask your healthcare team.    What infection is this antibiotic treating and how do you know I have that infection?    What side effects might occur from this antibiotic?    How long will I need to take this antibiotic?    Is it safe to take this antibiotic with other medications or supplements (e.g., vitamins) that I am taking?     Are there any special directions I need to know about taking this antibiotic? For example, should I take it with food?    How will I be monitored to know whether my infection is responding to the antibiotic?    What tests may help to make sure the right antibiotic is prescribed for me?      Information provided by:  www.cdc.gov/getsmart  U.S. Department of Health and Human Services  Centers for disease Control and Prevention  National Center for Emerging and Zoonotic Infectious Diseases  Division of Healthcare Quality Promotion             Medication List: This is a list of all your medications and when to take them. Check marks below indicate your daily home schedule. Keep this list as a reference.      Medications           Morning Afternoon Evening Bedtime As Needed    acetaminophen 325 MG tablet   Commonly known as:  TYLENOL   Take 2 tablets (650 mg) by mouth every 4 hours as needed for mild pain                                aspirin 81 MG EC tablet   Commonly known as:  ASA   Take 1 tablet (81 mg) by mouth daily   Last time this was given:  81 mg on 12/3/2018  7:59 PM                                azithromycin 250 MG tablet   Commonly  known as:  ZITHROMAX   Take 1 tablet (250 mg) by mouth daily for 3 days   Start taking on:  12/5/2018   Last time this was given:  250 mg on 12/4/2018  8:17 AM                                benzonatate 100 MG capsule   Commonly known as:  TESSALON   Take 1 capsule (100 mg) by mouth 3 times daily as needed for cough                                calcium + D 600-200 MG-UNIT Tabs   Take 1 tablet by mouth 2 times daily   Generic drug:  calcium carbonate-vitamin D                                cefdinir 300 MG capsule   Commonly known as:  OMNICEF   Take 1 capsule (300 mg) by mouth 2 times daily for 8 days                                estradiol 10 MCG Tabs vaginal tablet   Commonly known as:  VAGIFEM   INSERT 1 TABLET VAGINALLY TWICE WEEKLY                                fexofenadine 180 MG tablet   Commonly known as:  ALLEGRA   Take 180 mg by mouth daily as needed for allergies                                fluticasone 50 MCG/ACT nasal spray   Commonly known as:  FLONASE   INSTILL 2 SPRAYS INTO BOTH NOSTRILS ONCE DAILY                                furosemide 40 MG tablet   Commonly known as:  LASIX   Take 1 tablet (40 mg) by mouth daily   Last time this was given:  40 mg on 12/4/2018  8:17 AM                                gabapentin 100 MG capsule   Commonly known as:  NEURONTIN   200 mg in the morning and 100 mg at noon  200 mg in the evening   Last time this was given:  200 mg on 12/4/2018  8:17 AM                                guaiFENesin 600 MG 12 hr tablet   Commonly known as:  MUCINEX   Take 1 tablet (600 mg) by mouth 2 times daily as needed for congestion                                guaiFENesin-dextromethorphan 100-10 MG/5ML syrup   Commonly known as:  ROBITUSSIN DM   Take 5 mLs by mouth every 4 hours as needed for cough                                latanoprost 0.005 % ophthalmic solution   Commonly known as:  XALATAN   Place 1 drop into both eyes At Bedtime   Last time this was given:  1 drop on  12/4/2018 12:39 AM                                lisinopril 20 MG tablet   Commonly known as:  PRINIVIL/ZESTRIL   Take 1 tablet (20 mg) by mouth 2 times daily   Last time this was given:  20 mg on 12/4/2018  8:17 AM                                Multi-vitamin tablet   Take 1 tablet by mouth daily                                omeprazole 20 MG DR capsule   Commonly known as:  priLOSEC   TAKE 1 CAPSULE EVERY MORNING DAILY   Last time this was given:  20 mg on 12/4/2018  8:17 AM                                timolol maleate 0.5 % ophthalmic solution   Commonly known as:  TIMOPTIC   Place 1 drop into both eyes daily                                Vitamin D (Cholecalciferol) 1000 units Tabs   Take 2,000 Units by mouth daily

## 2018-12-01 NOTE — IP AVS SNAPSHOT
[facility] Observation Department    [address]    [facility] [address]    Phone:  [phone]                                       After Visit Summary   12/1/2018    Delmis Quarles    MRN: [id number]           After Visit Summary Signature Page     I have received my discharge instructions, and my questions have been answered. I have discussed any challenges I see with this plan with the nurse or doctor.    ..........................................................................................................................................  Patient/Patient Representative Signature      ..........................................................................................................................................  Patient Representative Print Name and Relationship to Patient    ..................................................               ................................................  Date                                   Time    ..........................................................................................................................................  Reviewed by Signature/Title    ...................................................              ..............................................  Date                                               Time          22EPIC Rev 08/18

## 2018-12-01 NOTE — IP AVS SNAPSHOT
` `           [facility] OBSERVATION DEPARTMENT: [phone]            Medication Administration Report for Delmis Quarles as of 12/04/18 1134   Legend:    Given Hold Not Given Due Canceled Entry Other Actions    Time Time (Time) Time  Time-Action       Inactive    Active    Linked        Medications 11/28/18 11/29/18 11/30/18 12/01/18 12/02/18 12/03/18 12/04/18    acetaminophen (TYLENOL) Suppository 650 mg  Dose: 650 mg  Freq: EVERY 4 HOURS PRN Route: RE  PRN Reason: mild pain  Start: 12/01/18 2015   Admin Instructions: Alternate ibuprofen (if ordered) with acetaminophen.  Maximum acetaminophen dose from all sources = 75 mg/kg/day not to exceed 4 grams/day.    Admin. Amount: 1 suppository (1 × 650 mg suppository)  Dispense Loc: RH ADS MS2B OBS               acetaminophen (TYLENOL) tablet 650 mg  Dose: 650 mg  Freq: EVERY 4 HOURS PRN Route: PO  PRN Reason: mild pain  Start: 12/01/18 2015   Admin Instructions: Alternate ibuprofen (if ordered) with acetaminophen.  Maximum acetaminophen dose from all sources = 75 mg/kg/day not to exceed 4 grams/day.    Admin. Amount: 2 tablet (2 × 325 mg tablet)  Dispense Loc: [facility] ADS MS2B OBS               aspirin EC tablet 81 mg  Dose: 81 mg  Freq: EVERY EVENING Route: PO  Start: 12/02/18 2000   Admin Instructions: DO NOT CRUSH.    Admin. Amount: 1 tablet (1 × 81 mg tablet)  Last Admin: 12/03/18 1959  Dispense Loc: [facility] ADS MS2B OBS         2040 (81 mg)-Given        1959 (81 mg)-Given        [ ] 2000           azithromycin (ZITHROMAX) tablet 250 mg  Dose: 250 mg  Freq: DAILY Route: PO  Indications of Use: COMMUNITY ACQUIRED PNEUMONIA  Start: 12/04/18 0800   Admin. Amount: 1 tablet (1 × 250 mg tablet)  Last Admin: 12/04/18 0817  Dispense Loc:  ADS MS2B OBS           0817 (250 mg)-Given           benzonatate (TESSALON) capsule 100 mg  Dose: 100 mg  Freq: 3 TIMES DAILY PRN Route: PO  PRN Reason: cough  Start: 12/03/18 1438   Admin. Amount: 1 capsule (1 × 100 mg  capsule)  Dispense Loc: [facility] ADS MS2B OBS               cefTRIAXone (ROCEPHIN) 2 g vial to attach to  ml bag for ADULTS or NS 50 ml bag for PEDS  Dose: 2 g  Freq: EVERY 24 HOURS Route: IV  Indications of Use: COMMUNITY ACQUIRED PNEUMONIA  Start: 12/03/18 1515   Admin Instructions: FIRST DOSE STAT    Admin. Amount: 2 g  Last Admin: 12/03/18 1855  Dispense Loc: [facility] ADS MS2B OBS  Infused Over: 30 Minutes  Volume: 20 mL          1855 (2 g)-New Bag        [ ] 1515           enoxaparin (LOVENOX) injection 40 mg  Dose: 40 mg  Freq: EVERY 24 HOURS Route: SC  Start: 12/01/18 2102   Admin. Amount: 40 mg = 0.4 mL Conc: 40 mg/0.4 mL  Last Admin: 12/03/18 1959  Dispense Loc: RH ADS MS2B OBS  Volume: 0.4 mL        2120 (40 mg)-Given        2039 (40 mg)-Given        1959 (40 mg)-Given               [ ] 2102           fexofenadine (ALLEGRA) tablet 180 mg  Dose: 180 mg  Freq: DAILY PRN Route: PO  PRN Reason: allergies  Start: 12/02/18 1132   Admin. Amount: 1 tablet (1 × 180 mg tablet)  Dispense Loc: RH ADS MS2B OBS               furosemide (LASIX) tablet 40 mg  Dose: 40 mg  Freq: DAILY Route: PO  Start: 12/03/18 0800   Admin. Amount: 1 tablet (1 × 40 mg tablet)  Last Admin: 12/04/18 0817  Dispense Loc: RH ADS MS2B OBS          0822 (40 mg)-Given        0817 (40 mg)-Given           gabapentin (NEURONTIN) capsule 100 mg  Dose: 100 mg  Freq: DAILY Route: PO  Start: 12/04/18 1200   Admin. Amount: 1 capsule (1 × 100 mg capsule)  Dispense Loc: RH ADS MS2B OBS           [ ] 1200           gabapentin (NEURONTIN) capsule 200 mg  Dose: 200 mg  Freq: 2 TIMES DAILY Route: PO  Start: 12/03/18 2000   Admin. Amount: 2 capsule (2 × 100 mg capsule)  Last Admin: 12/04/18 0817  Dispense Loc: RH ADS MS2B OBS          1959 (200 mg)-Given        0817 (200 mg)-Given       [ ] 2000           guaiFENesin (MUCINEX) 12 hr tablet 600 mg  Dose: 600 mg  Freq: 2 TIMES DAILY PRN Route: PO  PRN Reason: congestion  Start: 12/03/18 1529   Admin Instructions: DO  NOT CRUSH.    Admin. Amount: 1 tablet (1 × 600 mg tablet)  Dispense Loc: RH ADS MS2B OBS               labetalol (NORMODYNE/TRANDATE) injection 10 mg  Dose: 10 mg  Freq: EVERY 6 HOURS PRN Route: IV  PRN Reason: high blood pressure  PRN Comment: give for SBP > 180  Start: 12/02/18 1802   Admin Instructions: Hold for HR < 60  For ordered doses up to 80 mg, give IV Push undiluted. Give each 20 mg over 2 minutes.    Admin. Amount: 10 mg = 2 mL Conc: 5 mg/mL  Dispense Loc:  Main Pharmacy  Volume: 2 mL               latanoprost (XALATAN) 0.005 % ophthalmic solution 1 drop  Dose: 1 drop  Freq: AT BEDTIME Route: Both Eyes  Start: 12/02/18 2200   Admin Instructions: Has own med.    Admin. Amount: 1 drop  Last Admin: 12/04/18 0039  Dispense Loc:  Main Pharmacy  Volume: 2.5 mL         2039 (1 drop)-Given                0039 (1 drop)-Given       [ ] 2200           lisinopril (PRINIVIL/ZESTRIL) tablet 20 mg  Dose: 20 mg  Freq: 2 TIMES DAILY Route: PO  Start: 12/02/18 1143   Admin. Amount: 1 tablet (1 × 20 mg tablet)  Last Admin: 12/04/18 0817  Dispense Loc: RH ADS MS2B OBS         1304 (20 mg)-Given       2039 (20 mg)-Given        0822 (20 mg)-Given       1958 (20 mg)-Given        0817 (20 mg)-Given       [ ] 2000           meclizine (ANTIVERT) tablet 25 mg  Dose: 25 mg  Freq: 3 TIMES DAILY Route: PO  Start: 12/02/18 1400   Admin. Amount: 1 tablet (1 × 25 mg tablet)  Last Admin: 12/04/18 0817  Dispense Loc: RH ADS MS2B OBS         1516 (25 mg)-Given       2039 (25 mg)-Given        0822 (25 mg)-Given       1421 (25 mg)-Given       1959 (25 mg)-Given        0817 (25 mg)-Given       [ ] 1400       [ ] 2000           melatonin tablet 1 mg  Dose: 1 mg  Freq: AT BEDTIME PRN Route: PO  PRN Reason: sleep  Start: 12/01/18 2015   Admin Instructions: Do not give unless at least 6 hours of uninterrupted sleep is expected.    Admin. Amount: 1 tablet (1 × 1 mg tablet)  Dispense Loc: RH ADS MS2B OBS               naloxone (NARCAN)  injection 0.1-0.4 mg  Dose: 0.1-0.4 mg  Freq: EVERY 2 MIN PRN Route: IV  PRN Reason: opioid reversal  Start: 12/01/18 2015   Admin Instructions: For respiratory rate LESS than or EQUAL to 8.  Partial reversal dose:  0.1 mg titrated q 2 minutes for Analgesia Side Effects Monitoring Sedation Level of 3 (frequently drowsy, arousable, drifts to sleep during conversation).Full reversal dose:  0.4 mg bolus for Analgesia Side Effects Monitoring Sedation Level of 4 (somnolent, minimal or no response to stimulation).  For ordered IV doses 0.1-2mg give IVP. Give each 0.4mg over 15 seconds in emergency situations. For non-emergent situations further dilute in 9mL of NS to facilitate titration of response.    Admin. Amount: 0.1-0.4 mg = 0.25-1 mL Conc: 0.4 mg/mL  Dispense Loc: RH ADS MS2B OBS  Volume: 1 mL               omeprazole (priLOSEC) CR capsule 20 mg  Dose: 20 mg  Freq: EVERY MORNING BEFORE BREAKFAST Route: PO  Start: 12/03/18 0730   Admin. Amount: 1 capsule (1 × 20 mg capsule)  Last Admin: 12/04/18 0817  Dispense Loc: RH ADS MS2B OBS          0822 (20 mg)-Given        0817 (20 mg)-Given           ondansetron (ZOFRAN-ODT) ODT tab 4 mg  Dose: 4 mg  Freq: EVERY 6 HOURS PRN Route: PO  PRN Reasons: nausea,vomiting  Start: 12/01/18 2015   Admin Instructions: This is Step 1 of nausea and vomiting management.  If nausea not resolved in 15 minutes, go to Step 2 prochlorperazine (COMPAZINE). Do not push through foil backing. Peel back foil and gently remove. Place on tongue immediately. Administration with liquid unnecessary  With dry hands, peel back foil backing and gently remove tablet; do not push oral disintegrating tablet through foil backing; administer immediately on tongue and oral disintegrating tablet dissolves in seconds; then swallow with saliva; liquid not required.    Admin. Amount: 1 tablet (1 × 4 mg tablet)  Dispense Loc: RH ADS MS2B OBS              Or  ondansetron (ZOFRAN) injection 4 mg  Dose: 4 mg  Freq:  EVERY 6 HOURS PRN Route: IV  PRN Reasons: nausea,vomiting  Start: 12/01/18 2015   Admin Instructions: This is Step 1 of nausea and vomiting management.  If nausea not resolved in 15 minutes, go to Step 2 prochlorperazine (COMPAZINE).  Irritant. For ordered IV doses 0.1-4 mg, give IV Push undiluted over 2-5 minutes.    Admin. Amount: 4 mg = 2 mL Conc: 4 mg/2 mL  Dispense Loc: RH ADS MS2B OBS  Infused Over: 2-5 Minutes  Volume: 2 mL               prochlorperazine (COMPAZINE) injection 5 mg  Dose: 5 mg  Freq: EVERY 6 HOURS PRN Route: IV  PRN Reasons: nausea,vomiting  Start: 12/01/18 2048   Admin Instructions: This is Step 2 of nausea and vomiting management. Give if nausea not resolved 15 minutes after giving ondansetron (ZOFRAN). If nausea not resolved in 15 minutes, go to Step 3 metoclopramide (REGLAN), if ordered.  For ordered IV doses 0.1-10 mg, give IV Push undiluted. Each 5mg over 1 minute.    Admin. Amount: 5 mg = 1 mL Conc: 5 mg/mL  Dispense Loc: RH ADS MS2B OBS  Infused Over: 1-2 Minutes  Volume: 1 mL              Or  prochlorperazine (COMPAZINE) tablet 5 mg  Dose: 5 mg  Freq: EVERY 6 HOURS PRN Route: PO  PRN Reason: vomiting  Start: 12/01/18 2048   Admin Instructions: This is Step 2 of nausea and vomiting management. Give if nausea not resolved 15 minutes after giving ondansetron (ZOFRAN). If nausea not resolved in 15 minutes, go to Step 3 metoclopramide (REGLAN), if ordered.    Admin. Amount: 1 tablet (1 × 5 mg tablet)  Dispense Loc: RH ADS MS2B OBS              Or  prochlorperazine (COMPAZINE) Suppository 12.5 mg  Dose: 12.5 mg  Freq: EVERY 12 HOURS PRN Route: RE  PRN Reasons: nausea,vomiting  Start: 12/01/18 2048   Admin Instructions: This is Step 2 of nausea and vomiting management. Give if nausea not resolved 15 minutes after giving ondansetron (ZOFRAN). If nausea not resolved in 15 minutes, go to Step 3 metoclopramide (REGLAN), if ordered.    Admin. Amount: 0.5 suppository (0.5 × 25 mg  suppository)  Dispense Loc: RH ADS MS2B OBS               senna-docusate (SENOKOT-S/PERICOLACE) 8.6-50 MG per tablet 1 tablet  Dose: 1 tablet  Freq: 2 TIMES DAILY PRN Route: PO  PRN Reason: constipation  Start: 18   Admin Instructions: If no bowel movement in 24 hours, increase to 2 tablets PO.  Hold for loose stools.  This is the first step of a three step constipation treatment.    Admin. Amount: 1 tablet  Dispense Loc: RH ADS MS2B OBS              Or  senna-docusate (SENOKOT-S/PERICOLACE) 8.6-50 MG per tablet 2 tablet  Dose: 2 tablet  Freq: 2 TIMES DAILY PRN Route: PO  PRN Reason: constipation  Start: 18   Admin Instructions: Hold for loose stools.  This is the first step of a three step constipation treatment.    Admin. Amount: 2 tablet  Dispense Loc: RH ADS MS2B OBS               timolol maleate (TIMOPTIC) 0.5 % ophthalmic solution 1 drop  Dose: 1 drop  Freq: DAILY Route: Both Eyes  Start: 18 1143   Admin. Amount: 1 drop  Dispense Loc:  Main Pharmacy  Volume: 5 mL         (1307)-Not Given [C]        (0823)-Not Given        (0828)-Not Given          Completed Medications  Medications 18         Dose: 500 mg  Freq: EVERY 24 HOURS Route: IV  Indications of Use: COMMUNITY ACQUIRED PNEUMONIA  Start: 18 1500   End: 18   Admin Instructions: FIRST DOSE STAT.    Admin. Amount: 500 mg  Last Admin: 18  Dispense Loc:  Main Pharmacy  Infused Over: 1 Hours  Administrations Remainin  Volume: 250 mL   Mixture Administration Information:   Medication Type Amount   azithromycin 500 MG SOLR Medications 500 mg   sodium chloride 0.9 % SOLN Base 250 mL                  1559 (500 mg)-New Bag              Dose: 10 mL  Freq: ONCE Route: IV  Start: 18   End: 18   Admin. Amount: 10 mL  Last Admin: 18  Dispense Loc: [facility] Floor Stock  Administrations Remainin  Volume: 10 mL         " (10 mL)-Given                Dose: 10 mg  Freq: ONCE Route: IV  Start: 18   End: 18 125   Admin Instructions: Hold for HR < 60  For ordered doses up to 80 mg, give IV Push undiluted. Give each 20 mg over 2 minutes.    Admin. Amount: 10 mg = 2 mL Conc: 5 mg/mL  Last Admin: 18  Dispense Loc:  Main Pharmacy  Administrations Remainin  Volume: 2 mL         1254 (10 mg)-Given               Dose: 60 mL  Freq: ONCE Route: IV  Start: 18   End: 18   Admin. Amount: 60 mL  Last Admin: 18  Dispense Loc: [facility] Floor Stock  Administrations Remainin  Volume: 60 mL        1750 (60 mL)-Given             Discontinued Medications  Medications 18         Dose: 650 mg  Freq: EVERY 4 HOURS PRN Route: PO  PRN Reason: mild pain  Start: 18   End: 18 125   Admin Instructions: Maximum acetaminophen dose from all sources = 75 mg/kg/day not to exceed 4 grams/day.    Admin. Amount: 2 tablet (2 × 325 mg tablet)         1255-Med Discontinued           Rate: 100 mL/hr   Freq: CONTINUOUS Route: IV  Start: 18   End: 18   Last Admin: 18  Dispense Loc: [facility] Floor Stock  Volume: 1,000 mL        2120 ( )-New Bag        0748 ( )-New Bag       1802 ( )-New Bag       2256 ( )-New Bag        0500-Med Discontinued          Freq: EVERY 1 HOUR PRN Route: Top  PRN Reason: pain  PRN Comment: with VAD insertion or accessing implanted port.  Start: 18   End: 18   Admin Instructions: Do NOT give if patient has a history of allergy to any local anesthetic or any \"paty\" product.  Apply 30 minutes prior to VAD insertion or port access. MAX Dose: 2.5 g (  of 5 g tube).    Dispense Loc:  ADS MS2B OBS         1131-Med Discontinued           Dose: 1 mL  Freq: EVERY 1 HOUR PRN Route: OTHER  PRN Comment: mild pain with VAD insertion or accessing implanted port  Start: " "12/01/18 2047   End: 12/02/18 1131   Admin Instructions: Do NOT give if patient has a history of allergy to any local anesthetic or any \"paty\" product. MAX dose 1 mL subcutaneous OR intradermal in divided doses.    Admin. Amount: 1 mL  Dispense Loc: [facility] Floor Stock  Volume: 2 mL         1131-Med Discontinued           Dose: 17 g  Freq: DAILY PRN Route: PO  PRN Reason: constipation  Start: 12/01/18 2048   End: 12/02/18 1131   Admin Instructions: Give in 8oz of  water, juice, or soda. Hold for loose stools.  This is the second step of a three step constipation treatment.  1 Packet = 17 grams. Mixed prescribed dose in 8 ounces of water. Follow with 8 oz. of water.    Admin. Amount: 17 g  Dispense Loc:  ADS MS2B OBS         1131-Med Discontinued           Dose: 3 mL  Freq: EVERY 8 HOURS Route: IK  Start: 12/01/18 2049   End: 12/02/18 1131   Admin Instructions: And Q1H PRN, to lock peripheral IV dormant line.    Admin. Amount: 3 mL  Last Admin: 12/01/18 2120  Dispense Loc: [facility] Floor Stock  Volume: 4 mL        2120 (3 mL)-Given        (0611)-Not Given       1131-Med Discontinued           Dose: 3 mL  Freq: EVERY 1 HOUR PRN Route: IK  PRN Reason: line flush  Start: 12/01/18 2047   End: 12/02/18 1131   Admin Instructions: for peripheral IV flush post IV meds    Admin. Amount: 3 mL  Dispense Loc: [facility] Floor Stock  Volume: 4 mL         1131-Med Discontinued      Medications 11/28/18 11/29/18 11/30/18 12/01/18 12/02/18 12/03/18 12/04/18        "

## 2018-12-01 NOTE — ED TRIAGE NOTES
"[facility] at independent living center. States that she was feeling weak and legs \"gave out and went weak\" and she lowered herself to the ground to prevent fall. Denies injury, states that BP was high on scene.   "

## 2018-12-01 NOTE — IP AVS SNAPSHOT
` ` Patient Information     Patient Name Sex     Delmis Quarles ([id number]) Female 1924       Room Bed    [facility] 0221-01      Patient Demographics     Address Phone    [address]   [facility] [address] [phone] (Home)  none (Work)  none (Mobile)      Patient Ethnicity & Race     Ethnic Group Patient Race    American White      Emergency Contact(s)     Name Relation Home Work Mobile    Denise Becerril Daughter [phone]    Augustine Abreu Daughter [phone]     Vincent Quarles Son   [phone]      Documents on File        Status Date Received Description       Documents for the Patient    Insurance Card  ()      Face Sheet Received () 10/13/08     Insurance Card  ()  medicare    External Medication Information Consent Accepted () 09     Insurance Card Received () 09     Face Sheet Received () 09     SUNITA Face Sheet Received but Refused Research () 05/20/10     Insurance Card  () 05/20/10     Other  05/20/10 Medicare questions    Privacy Notice - [place] Received 11/18/10     External Medication Information Consent Accepted () 05/28/10     Patient ID Received () 12     Consent for Services - Hospital/Clinic Received () 09/29/10     Consent for Services - Hospital/Clinic Received () 05/20/10     External Medication Information Consent Accepted () 06/10/11     Consent for Services - Hospital/Clinic Received () 06/10/11     Insurance Card Received () 12     Insurance Card Received () 12 Medicare    CMS IM for Patient Signature       Consent for Services - Hospital/Clinic Received () 12     External Medication Information Consent Accepted () 12     HIM MO Authorization  12 Medicare    Consent for EHR Access  13 Copied from existing Consent for services - C/HOD collected on  2012    [facility] Specified Other       Insurance Card Received () 13 [place]    Waiver - Payment Received () 13     Consent for Services - Hospital/Clinic Received () 13     External Medication Information Consent Accepted 13     Insurance Card Received 14 medicare    Insurance Card Received () 14 commercial [place]    Consent to Communicate Received 14 auth to discuss    Patient ID Received 14 [place]    Advance Directives and Living Will Received 14 POLST 2014    Consent for Services - Hospital/Clinic Received () 14 Consent For Services    Consent to Communicate Received 01/02/15 email decline    HIM MO Authorization  05/07/15 [facility]    Advance Directives and Living Will Received 06/02/15 Health Care Directive 02    Advance Directives and Living Will Not Received  Validation of AD 02    Consent for Services - Hospital/Clinic Received () 10/28/15     Insurance Card Received 10/28/15 [place]    Consent for Services/Privacy Notice - Hospital/Clinic Received () 17     Consent for Services/Privacy Notice - Hospital/Clinic Received () 16     Consent for Services/Privacy Notice - Hospital/Clinic       Insurance Card Received 17 COMMERCIAL    HIM MO Authorization  17 [facility] phy/[facility] Everywhere Prospective Auth Received 17     Consent for Services - Hospital and Clinic Received 18     HIE Auth Received 18     Patient ID Received 18     Insurance Card Received 18     Insurance Card Received 18     Privacy Notice - [place]  (Deleted) 03     External Medication Information Consent  (Deleted) 09 pt signed medication authorization    Advance Directives and Living Will Not Received (Deleted)  POLST 2014       Documents for the Encounter    CMS IM for Patient Signature Received  12/04/18     Observation Notice Received 12/01/18 Given by VERNA WALSH    CMS CIPRIANO for Patient Signature Received 12/03/18       Admission Information     Attending Provider Admitting Provider Admission Type Admission Date/Time    Joon Erazo MD Young, Joon Peters MD Emergency 12/01/18  1104    Discharge Date Hospital Service Auth/Cert Status Service Area     Observation Incomplete [facility]    Unit Room/Bed Admission Status        OBSERVATION DEPT 0221/0221-01 Admission (Confirmed)       Admission     Complaint    Weakness, Pneumonia      Hospital Account     Name Acct ID Class Status Primary Coverage    Delmis Quarles [id number] Inpatient Open MEDICARE - MEDICARE            Guarantor Account (for Hospital Account #[id number])     Name Relation to Pt Service Area Active? Acct Type    Delmis Quarles  FCS Yes Personal/Family    Address Phone          [address] [place] [address] [phone](H)  none(O)              Coverage Information (for Hospital Account #[id number])     1. MEDICARE/MEDICARE     F/O Payor/Plan Precert #    MEDICARE/MEDICARE     Subscriber Subscriber #    Delmis Quarles [id number]    Address Phone    [employer] CLAIMS  [address]  [facility] [place] [address] [phone]          2. COMMERCIAL/OTHER     F/O Payor/Plan Precert #    COMMERCIAL/OTHER     Subscriber Subscriber #    Delmis Quarles [id number]    Address Phone    [address] [address]  [place] [address] [phone]

## 2018-12-01 NOTE — ED PROVIDER NOTES
History     Chief Complaint:  Leg weakness    The history is provided by the patient.      Delmis Quarles is a 89 year old female with a history of arthritis, generalized weakness, chronic neuropathy of the lower extremities, and s/p bilateral TKR who presents with leg weakness. The patient has some trouble getting herself out of bed secondary to lower extremity weakness due to worsening neuropathy even after physical therapy. This morning she attempted to get out of bed (which is short enough for her to sit on the bed with her feet on the ground), but felt like her legs would not be able to support her. She guided herself to the ground, sliding down the side of her mattress. She was unable to get up from the ground and called EMS. The patient is chronically lightheaded, but says that this is worse today with some dizziness. She also endorses mild nausea, but denies shortness of breath, palpitations, vomiting, or diarrhea. She has a cough.     Allergies:  Trospium  Codeine  Penicillins  Sulfa Drugs      Medications:    Baby aspirin  Estradiol  Allegra  Flonase  Lasix  Gabapentin  Robitussin  Xalatan  Lisinopril  Omeprazole  Timolol    Past Medical History:    Blood transfusion  Neuropathy  Generalized weakness  Hyponatremia  Osteoarthritis of left glenohumerol joint  Median nerve injury  Hypertension  Cervicalgia  Chronic gastric ulcer   Lymphedema  Stricture and stenosis of esophagus  Embolism and thrombosis  Osteoporosis  Diffuse cystic mastopathy     Past Surgical History:    Bilateral toe amputation  Bilateral TKR  Appendectomy  Total hysterectomy  Spinal fusion  Sigmoidoscopy  T&A  Ligation/strip long and short saphen - RFA of right vein  Repair hammer toe    Family History:    CAD  CHF  Leukemia  Sjogren's    Social History:  Smoking Status: Never Smoker  Alcohol Use: No  Patient presents with daughter.   Marital Status:        Review of Systems   Respiratory: Positive for cough. Negative for  shortness of breath.    Cardiovascular: Negative for palpitations.   Gastrointestinal: Positive for nausea. Negative for diarrhea and vomiting.   Neurological: Positive for dizziness, weakness and light-headedness.   All other systems reviewed and are negative.      Physical Exam   Patient Vitals for the past 24 hrs:   BP Temp Temp src Pulse Heart Rate Resp SpO2   12/01/18 1316 - - - - 96 21 95 %   12/01/18 1302 - - - - 90 16 94 %   12/01/18 1227 - - - - 93 16 95 %   12/01/18 1215 186/88 - - - 101 13 92 %   12/01/18 1145 (!) 157/97 - - - 98 18 -   12/01/18 1130 (!) 168/96 - - - 102 23 95 %   12/01/18 1116 (!) 192/126 97.9  F (36.6  C) Oral 116 - 18 96 %      Physical Exam  Constitutional: Vital signs reviewed as above.  Head: No external signs of trauma noted.  Eyes: Pupils are equal, round, and reactive to light.   Neck: No JVD noted  Cardiovascular: Tachycardic rate, regular rhythm and normal heart sounds.  No murmur heard. Equal B/L peripheral pulses.  Pulmonary/Chest: Effort normal and breath sounds normal. No respiratory distress. Patient has no wheezes. Patient has no rales.   Gastrointestinal: Soft. There is no tenderness.   Musculoskeletal/Extremities: No edema noted. Normal tone.  Neurological: Patient is alert. GCS 15   Skin: Skin is warm and dry. There is no diaphoresis noted.   Psychiatric: The patient appears calm.    Emergency Department Course     ECG (11:10:49):  Rate 108 bpm. CA interval 208. QRS duration 80. QT/QTc 314/420. P-R-T axes 87 42 35. Sinus tachycardia. Nonspecific T wave abnormality. No significant change when compared to EKG dated 8/17/18. Interpreted at 1112 by Tyler Mosqueda DO.    Imaging:       MRI Brain w/o and w/ contrast:  Pending    MR Head w/o contrast Angiogram:  Pending     MR Neck w/o and w/ contrast Angiogram:  Pending     Laboratory:  CBC: WNL (WBC 8.9, HGB 13.7, )  BMP: Glucose 112 (H), o/w WNL (Creatinine 0.81)  1226 Troponin: 0.030   Magnesium: 1.8    UA:  Ketone 5, Squamous epithelial 5 (H), Mucous present , o/w Negative   Urine Culture: Pending    Emergency Department Course:  The patient arrived in the emergency department via EMS.     Past medical records, nursing notes, and vitals reviewed.  1111: I performed an exam of the patient and obtained history, as documented above.    IV inserted and blood drawn.     1330: I rechecked the patient. Explained findings to patient.     The patient provided a urine sample here in the emergency department. This was sent for laboratory testing, findings above.     1302: I rechecked the patient. Explained findings to patient.     1502: I rechecked the patient. Explained findings to patient.    1542: Road test was unsuccessful. I paged the hospitalist's service.     1546: Findings and plan explained to the Patient who consents to admission.     The patient was sent for a MR imaging while in the emergency department, findings above.      The patient will be signed out to Wes Merino CNP.     Impression & Plan      Medical Decision Making:  This 89-year-old female patient presents the ED due to bilateral lower extremely weakness as well as some dizziness.  Please see the HPI and exam for specifics.  The patient and her family notes that the patient has been progressively declining in function and they have been looking into higher levels of care for her.  She currently resides at an assisted living facility.  The patient noted that she woke up this morning and tried to push herself up as she normally does but did not quite have the strength, then felt dizzy, and then felt that her legs would not support her (which is not an uncommon occurrence) and then lowered herself to the ground slowly.  During the patient's time in the ED she did not appear to be dizzy.  There were not any notable focal neurological deficits though as we tried to stand and ambulate the patient she became very shaky and felt dizzy.  I am unsure if this was  anxiety or true vertigo but the patient seem to think that she felt very dizzy.  At the time of this dictation, MRI of her brain as well as vessel imaging has been ordered.  The patient will be signed out to the next provider pending MRI results for (likely) observation    Critical Care time:  none    Diagnosis:    ICD-10-CM    1. Generalized muscle weakness M62.81    2. Dizziness R42        Disposition:  Signed out to VIVIAN Parks Chi  12/1/2018   [facility] EMERGENCY DEPARTMENT    Scribe Disclosure:  IHerberth Chi, am serving as a scribe at 11:11 AM on 12/1/2018 to document services personally performed by Tyler Mosqueda DO based on my observations and the provider's statements to me.        Tyler Mosqueda DO  12/01/18 [id number]

## 2018-12-01 NOTE — ED NOTES
"Pt attempted an ambulation trial. Pt was unable to do so due to a complaint of dizziness while sitting. Pt stated that dizziness didn't go away until she laid back down. Patients daughter also noted shaking of upper extremities which daughter claimed was not consistent with pt baseline. Pt stated she did not \"feel safe trying to stand\".  "

## 2018-12-01 NOTE — ED NOTES
Bed: ED23  Expected date: 12/1/18  Expected time: 10:51 AM  Means of arrival: Ambulance  Comments:  pernell Brennan

## 2018-12-02 ENCOUNTER — APPOINTMENT (OUTPATIENT)
Dept: PHYSICAL THERAPY | Facility: CLINIC | Age: 83
DRG: 689 | End: 2018-12-02
Attending: PHYSICIAN ASSISTANT
Payer: MEDICARE

## 2018-12-02 LAB
ANION GAP SERPL CALCULATED.3IONS-SCNC: 8 MMOL/L (ref 3–14)
BUN SERPL-MCNC: 13 MG/DL (ref 7–30)
CALCIUM SERPL-MCNC: 8.8 MG/DL (ref 8.5–10.1)
CHLORIDE SERPL-SCNC: 105 MMOL/L (ref 94–109)
CO2 SERPL-SCNC: 26 MMOL/L (ref 20–32)
CREAT SERPL-MCNC: 0.79 MG/DL (ref 0.52–1.04)
ERYTHROCYTE [DISTWIDTH] IN BLOOD BY AUTOMATED COUNT: 13.7 % (ref 10–15)
GFR SERPL CREATININE-BSD FRML MDRD: 68 ML/MIN/1.7M2
GLUCOSE SERPL-MCNC: 105 MG/DL (ref 70–99)
HCT VFR BLD AUTO: 39 % (ref 35–47)
HGB BLD-MCNC: 12.5 G/DL (ref 11.7–15.7)
INTERPRETATION ECG - MUSE: NORMAL
MCH RBC QN AUTO: 29.8 PG (ref 26.5–33)
MCHC RBC AUTO-ENTMCNC: 32.1 G/DL (ref 31.5–36.5)
MCV RBC AUTO: 93 FL (ref 78–100)
PLATELET # BLD AUTO: 233 10E9/L (ref 150–450)
POTASSIUM SERPL-SCNC: 3.5 MMOL/L (ref 3.4–5.3)
RBC # BLD AUTO: 4.19 10E12/L (ref 3.8–5.2)
SODIUM SERPL-SCNC: 139 MMOL/L (ref 133–144)
WBC # BLD AUTO: 6.1 10E9/L (ref 4–11)

## 2018-12-02 PROCEDURE — G0378 HOSPITAL OBSERVATION PER HR: HCPCS

## 2018-12-02 PROCEDURE — 80048 BASIC METABOLIC PNL TOTAL CA: CPT | Performed by: PHYSICIAN ASSISTANT

## 2018-12-02 PROCEDURE — 97161 PT EVAL LOW COMPLEX 20 MIN: CPT | Mod: GP | Performed by: PHYSICAL THERAPIST

## 2018-12-02 PROCEDURE — 25000128 H RX IP 250 OP 636: Performed by: PHYSICIAN ASSISTANT

## 2018-12-02 PROCEDURE — 96374 THER/PROPH/DIAG INJ IV PUSH: CPT

## 2018-12-02 PROCEDURE — 25000132 ZZH RX MED GY IP 250 OP 250 PS 637: Mod: GY | Performed by: PHYSICIAN ASSISTANT

## 2018-12-02 PROCEDURE — 99207 ZZC CDG-CODE CATEGORY CHANGED: CPT | Performed by: PHYSICIAN ASSISTANT

## 2018-12-02 PROCEDURE — 40000193 ZZH STATISTIC PT WARD VISIT: Performed by: PHYSICAL THERAPIST

## 2018-12-02 PROCEDURE — A9270 NON-COVERED ITEM OR SERVICE: HCPCS | Mod: GY | Performed by: PHYSICIAN ASSISTANT

## 2018-12-02 PROCEDURE — 96372 THER/PROPH/DIAG INJ SC/IM: CPT

## 2018-12-02 PROCEDURE — 99225 ZZC SUBSEQUENT OBSERVATION CARE,LEVEL II: CPT | Performed by: PHYSICIAN ASSISTANT

## 2018-12-02 PROCEDURE — 85027 COMPLETE CBC AUTOMATED: CPT | Performed by: PHYSICIAN ASSISTANT

## 2018-12-02 PROCEDURE — 36415 COLL VENOUS BLD VENIPUNCTURE: CPT | Performed by: PHYSICIAN ASSISTANT

## 2018-12-02 RX ORDER — LATANOPROST 50 UG/ML
1 SOLUTION/ DROPS OPHTHALMIC AT BEDTIME
Status: DISCONTINUED | OUTPATIENT
Start: 2018-12-02 | End: 2018-12-04 | Stop reason: HOSPADM

## 2018-12-02 RX ORDER — TIMOLOL MALEATE 5 MG/ML
1 SOLUTION/ DROPS OPHTHALMIC DAILY
Status: DISCONTINUED | OUTPATIENT
Start: 2018-12-02 | End: 2018-12-04 | Stop reason: HOSPADM

## 2018-12-02 RX ORDER — FEXOFENADINE HCL 180 MG/1
180 TABLET ORAL DAILY PRN
Status: DISCONTINUED | OUTPATIENT
Start: 2018-12-02 | End: 2018-12-04 | Stop reason: HOSPADM

## 2018-12-02 RX ORDER — LABETALOL HYDROCHLORIDE 5 MG/ML
10 INJECTION, SOLUTION INTRAVENOUS ONCE
Status: COMPLETED | OUTPATIENT
Start: 2018-12-02 | End: 2018-12-02

## 2018-12-02 RX ORDER — ASPIRIN 81 MG/1
81 TABLET ORAL EVERY EVENING
Status: DISCONTINUED | OUTPATIENT
Start: 2018-12-02 | End: 2018-12-04 | Stop reason: HOSPADM

## 2018-12-02 RX ORDER — LABETALOL HYDROCHLORIDE 5 MG/ML
10 INJECTION, SOLUTION INTRAVENOUS EVERY 6 HOURS PRN
Status: DISCONTINUED | OUTPATIENT
Start: 2018-12-02 | End: 2018-12-04 | Stop reason: HOSPADM

## 2018-12-02 RX ORDER — LISINOPRIL 20 MG/1
20 TABLET ORAL 2 TIMES DAILY
Status: DISCONTINUED | OUTPATIENT
Start: 2018-12-02 | End: 2018-12-04 | Stop reason: HOSPADM

## 2018-12-02 RX ORDER — ACETAMINOPHEN 325 MG/1
650 TABLET ORAL EVERY 4 HOURS PRN
Status: DISCONTINUED | OUTPATIENT
Start: 2018-12-02 | End: 2018-12-02

## 2018-12-02 RX ORDER — FUROSEMIDE 40 MG
40 TABLET ORAL DAILY
Status: DISCONTINUED | OUTPATIENT
Start: 2018-12-03 | End: 2018-12-04 | Stop reason: HOSPADM

## 2018-12-02 RX ORDER — MECLIZINE HYDROCHLORIDE 25 MG/1
25 TABLET ORAL 3 TIMES DAILY
Status: DISCONTINUED | OUTPATIENT
Start: 2018-12-02 | End: 2018-12-04 | Stop reason: HOSPADM

## 2018-12-02 RX ADMIN — MECLIZINE HYDROCHLORIDE 25 MG: 25 TABLET ORAL at 20:39

## 2018-12-02 RX ADMIN — SODIUM CHLORIDE, POTASSIUM CHLORIDE, SODIUM LACTATE AND CALCIUM CHLORIDE: 600; 310; 30; 20 INJECTION, SOLUTION INTRAVENOUS at 07:48

## 2018-12-02 RX ADMIN — SODIUM CHLORIDE, POTASSIUM CHLORIDE, SODIUM LACTATE AND CALCIUM CHLORIDE: 600; 310; 30; 20 INJECTION, SOLUTION INTRAVENOUS at 18:02

## 2018-12-02 RX ADMIN — LABETALOL HYDROCHLORIDE 10 MG: 5 INJECTION INTRAVENOUS at 12:54

## 2018-12-02 RX ADMIN — LATANOPROST 1 DROP: 50 SOLUTION/ DROPS OPHTHALMIC at 20:39

## 2018-12-02 RX ADMIN — LISINOPRIL 20 MG: 20 TABLET ORAL at 20:39

## 2018-12-02 RX ADMIN — ASPIRIN 81 MG: 81 TABLET, COATED ORAL at 20:40

## 2018-12-02 RX ADMIN — ENOXAPARIN SODIUM 40 MG: 40 INJECTION SUBCUTANEOUS at 20:39

## 2018-12-02 RX ADMIN — LISINOPRIL 20 MG: 20 TABLET ORAL at 13:04

## 2018-12-02 RX ADMIN — MECLIZINE HYDROCHLORIDE 25 MG: 25 TABLET ORAL at 15:16

## 2018-12-02 RX ADMIN — SODIUM CHLORIDE, POTASSIUM CHLORIDE, SODIUM LACTATE AND CALCIUM CHLORIDE: 600; 310; 30; 20 INJECTION, SOLUTION INTRAVENOUS at 22:56

## 2018-12-02 NOTE — PLAN OF CARE
Problem: Patient Care Overview  Goal: Plan of Care/Patient Progress Review  Outcome: No Change  PRIMARY DIAGNOSIS: VERTIGO     OUTPATIENT/OBSERVATION GOALS TO BE MET BEFORE DISCHARGE  1. Orthostatic performed: No, Patient too weak to get orthostatics at this time.      2. Completion of appropriate imaging: Yes     3. Tolerating PO medications: Yes     4. Return to near baseline physical activity: No     5. Cleared for discharge by consultants (if involved): No     Patient alert and oriented x4. Patient denies pain at this time. Patient denies dizziness when laying in bed with head slightly elevated. Purewhick in place due to dizziness when up. LR running at 100 mL/hr. Regular diet. Patient tolerating water. PT and SW consulted for tomorrow.      Discharge Planner Nurse   Safe discharge environment identified: Yes  Barriers to discharge: No       Entered by: Marilee Anguiano 12/01/2018   Please review provider order for any additional goals.   Nurse to notify provider when observation goals have been met and patient is ready for discharge.

## 2018-12-02 NOTE — PLAN OF CARE
"Problem: Patient Care Overview  Goal: Plan of Care/Patient Progress Review  Outcome: No Change  Problem: Patient Care Overview  Goal: Plan of Care/Patient Progress Review  Outcome: Improving  PRIMARY DIAGNOSIS: GENERALIZED WEAKNESS      OUTPATIENT/OBSERVATION GOALS TO BE MET BEFORE DISCHARGE  1. Orthostatic performed: Yes:          Lying Orthostatic BP: 177/76         Sitting Orthostatic BP: 208/98         Standing Orthostatic BP: 197/105       2. Tolerating PO medications: pt has been able to eat and drink normally      3. Return to near baseline physical activity: No. Patient c/o of dizziness when in bed. Patient remains too weak and dizzy to get out of bed.       4. Cleared for discharge by consultants (if involved): No, SW working on placement      Discharge Planner Nurse   Safe discharge environment identified: No, pt lives in independent senior living by herself with minimal cares.   Barriers to discharge: Yes, pending placement     Please review provider order for any additional goals.   Nurse to notify provider when observation goals have been met and patient is ready for discharge.      /79 (BP Location: Right arm)  Pulse 116  Temp 98.1  F (36.7  C) (Oral)  Resp 16  Ht 1.626 m (5' 4\")  Wt 63.5 kg (140 lb)  SpO2 95%  BMI 24.03 kg/m2.  saline locked. Up w/assist x2. Positive orthos earlier. Pt reported dizziness while in beed. Tolerating regular diet. Continent with brief on. Plan: monitor for dizziness, placement, get pt up in chair this afternoon      "

## 2018-12-02 NOTE — PLAN OF CARE
Problem: Patient Care Overview  Goal: Plan of Care/Patient Progress Review  Outcome: No Change  ROOM # 221    Living Situation (if not independent, order SW consult): ind   Facility name:   : [facility]     Activity level at baseline: ind with walker  Activity level on admit: up with 2       Patient registered to observation; given Patient Bill of Rights; given the opportunity to ask questions about observation status and their plan of care.  Patient has been oriented to the observation room, bathroom and call light is in place.    Discussed discharge goals and expectations with patient/family.

## 2018-12-02 NOTE — CONSULTS
SW met with patient at bedside for assessment of discharge planning needs. Patient is alert and oriented X3, conversant, and able to participate in discharge planning. Also present was her daughters. Patient was at [facility] from 8/21-9/19. After the TCU [facility] and she received home care services. According to her daughters the therapy sessions ended this week with them informing the family they feel she is probably at her new baseline. Family was in talks with [facility] to see about increased cares.     Bedside nurse says she spoke to someone at [facility] and they would not be able to take patient back if she is an assist of 2.     It was explained to the patient and/or primary decision maker that he/she is in observation admission status. It was explained that his/her Part B Medicare benefits will be billed for this stay. It was explained that Medicare requires a 3 night overnight stay as an Inpatient for Medicare to cover TCU stay, and Observation status does not count toward this requirement. If it is recommended that patient discharges to a TCU, it would be Private Pay. The literature was given and questions were answered.         The family agreed to a referral to [facility]. EDWIN sent over referral and left message.       ROB Lynn  [phone]

## 2018-12-02 NOTE — PHARMACY-ADMISSION MEDICATION HISTORY
Admission medication history interview status for this patient is complete. See [facility] admission navigator for allergy information, prior to admission medications and immunization status.     Medication history interview source(s):Daughter Denise  Medication history resources (including written lists, pill bottles, clinic record):DC summary from 8/17/18  Primary pharmacy:[facility]    Changes made to PTA medication list:  Changed: corrected entry for Vitamin D.  Dose same    Actions taken by pharmacist (provider contacted, etc):None     Additional medication history information:Patient did not know about her medications but told me to check with her daughter.  She did say she didn't have her medications today.  I reviewed meds with her daughter via discontinue summary from 8/17?18.  She said nothing had changed since then.      Medication reconciliation/reorder completed by provider prior to medication history? No    Do you take OTC medications (eg tylenol, ibuprofen, fish oil, eye/ear drops, etc)? Y    For patients on insulin therapy: N    Prior to Admission medications    Medication Sig Last Dose Taking? Auth Provider   aspirin 81 MG EC tablet Take 1 tablet (81 mg) by mouth daily  Patient taking differently: Take 81 mg by mouth every evening  11/30/2018 at Unknown time Yes Rhonda Aj MD   Calcium Carbonate-Vitamin D (CALCIUM + D) 600-200 MG-UNIT per tablet Take 1 tablet by mouth 2 times daily  11/30/2018 at Unknown time Yes Reported, Patient   fexofenadine (ALLEGRA) 180 MG tablet Take 180 mg by mouth daily as needed for allergies 11/30/2018 at Unknown time Yes Unknown, Entered By History   fluticasone (FLONASE) 50 MCG/ACT spray INSTILL 2 SPRAYS INTO BOTH NOSTRILS ONCE DAILY 11/30/2018 at Unknown time Yes Rhonda Aj MD   furosemide (LASIX) 40 MG tablet Take 1 tablet (40 mg) by mouth daily 11/30/2018 at Unknown time Yes Donte Palma,    gabapentin (NEURONTIN) 100 MG capsule  200 mg in the morning and 100 mg at noon  200 mg in the evening 11/30/2018 at Unknown time Yes Rhonda Aj MD   guaiFENesin-dextromethorphan (ROBITUSSIN DM) 100-10 MG/5ML syrup Take 5 mLs by mouth every 4 hours as needed for cough 11/30/2018 at Unknown time Yes Donte Palma DO   latanoprost (XALATAN) 0.005 % ophthalmic solution Place 1 drop into both eyes At Bedtime  11/30/2018 at hs Yes Reported, Patient   lisinopril (PRINIVIL/ZESTRIL) 20 MG tablet Take 1 tablet (20 mg) by mouth 2 times daily 11/30/2018 at Unknown time Yes Donte Palma DO   multivitamin, therapeutic with minerals (MULTI-VITAMIN) TABS tablet Take 1 tablet by mouth daily 11/30/2018 at Unknown time Yes Unknown, Entered By History   omeprazole (PRILOSEC) 20 MG CR capsule TAKE 1 CAPSULE EVERY MORNING DAILY 11/30/2018 at Unknown time Yes Rhonda Aj MD   timolol (TIMOPTIC) 0.5 % ophthalmic solution Place 1 drop into both eyes daily 11/30/2018 at Unknown time Yes Travis Calixto MD   Vitamin D, Cholecalciferol, 1000 units TABS Take 2,000 Units by mouth daily 11/30/2018 at Unknown time Yes Unknown, Entered By History   acetaminophen (TYLENOL) 325 MG tablet Take 2 tablets (650 mg) by mouth every 4 hours as needed for mild pain Unknown at Unknown time  Donte Palma DO   estradiol (VAGIFEM) 10 MCG TABS vaginal tablet INSERT 1 TABLET VAGINALLY TWICE WEEKLY  Patient taking differently: INSERT 1 TABLET VAGINALLY TWICE WEEKLY Tues/Fri 11/30/2018  Rhonda Aj MD

## 2018-12-02 NOTE — H&P
History and Physical     Delmis Quarles MRN# [id number]   YOB: 1924 Age: 89 year old      Date of Admission:  12/1/2018    Primary care provider: Sherry Monteiro          Assessment and Plan:   Delmis Quarles is a 89 year old female with a PMH significant for neuropathy, bilateral knee arthroplasty, hypertension, and history of gastric ulcer/esophageal stricture who presents with weakness and dizziness.    Patient was discussed with Wes Merino, who was provider in ED. Chart review of ED work up was reviewed as well as chart review of Care Everywhere, previous visits and admissions.     1. Bilateral lower extremity weakness  Patient reports chronic neuropathy of the lower extremities with worsening extremity weakness over the last few months.  She attempted to stand today but could not and slid to the floor.  She does not report any significant discomfort but is too weak to return home.  UA appears unremarkable and there is no significant metabolic derangement.  Per prior [facility] discharge summary she was supposed to have follow-up with neurology for EMGs of her lower legs but I do not see record of this.  Will need to talk with family regarding this tomorrow.  She did not have any lumbar spine imaging but does not report significant leg pain or radicular symptoms.  Based on lumbar MRI in 2014 she had severe spinal canal stenosis.  I suppose this could be contributing but I doubt she would be considered a good surgical candidate.  Per the ED physician the family has been considering increasing the services that she receives due to progressive physical decline.  Likely she will benefit from rehab or increasing services at home.  -Physical therapy consult  -Social work consult    2.  Dizziness  The patient attempted to  the ED but reported dizziness that she described as the room moving.  There was concern for stroke so she had an MRI brain and MRA head/neck which were all negative.   Those symptoms have since resolved.  She does report she may be slightly dehydrated but they did not check orthostatics.   -Plan for lactated Ringer's at 100 ml/hr overnight      2.  Hypertension  She has a history of being on Bumex and Spironolactone when she was admitted to [facility] in August 2018.  These were discontinued and she was discharged on lisinopril with Lasix.  Her pressures on arrival are elevated at 192/126 but her last pressure was 132/64.  -Pharmacy is currently reconciling medications   -Plan to hold Lasix but will continue her home medications tomorrow    2.  History of gastric ulcer  Continue omeprazole      Social: Lives independently and ambulates with walker or scooter. Likely needs increased services.  Code: Discussed with patient and they have chosen DNR/DNI  VTE prophylaxis: Lovenox  Disposition: Observation                    Chief Complaint:   Weakness         History of Present Illness:   Delmis Quarles is a 89 year old female who presents with bilateral leg weakness.  Per the patient she has become progressively more weak over the last few months that she relates to her peripheral neuropathy.  She uses a walker and scooter as needed.  Today she attempted to stand but could not and actually slid to the floor.  She was unable to get up from the ground and called EMS.  She reports issues with intermittent lightheadedness or dizziness but did not have any today when she slid to the ground.  In the ED they ruled out infection and metabolic derangement.  She was then road tested but felt like the room was spinning.  At that time she had MRI and MRA imaging of her brain which was negative for stroke.  She now feels like those symptoms have resolved.  Per chart review it seems like her family has been concerned about her declining health and considering increasing her level of care.  She reports no recent illnesses but has had a cough with no shortness of breath, chest discomfort, syncope,  abdominal pain, nausea, vomiting, diarrhea or lower leg swelling.  She has not started any new medications and has been taking everything as prescribed.  She reports a good appetite and has been eating/drinking well             Past Medical History:     Past Medical History:   Diagnosis Date     Arthritis      Blood transfusion      Chronic gastric ulcer without mention of hemorrhage, perforation, without mention of obstruction 5/5/2006    EGD, NSAID induced; PPI indefinitely,      Diffuse cystic mastopathy      Embolism and thrombosis of unspecified site 1992    Post phlebitic syndrome; [employer] stocking     Essential hypertension, benign 1972     Neuropathy     FEET AND HANDS     Osteoporosis, unspecified 1/00    Dexa done in [place], [place]; Fosamax started 1/00     Other lymphedema      Stricture and stenosis of esophagus 2000    dilitation                Past Surgical History:     Past Surgical History:   Procedure Laterality Date     AMPUTATE TOE(S) BILATERAL  4/23/2014    Procedure: AMPUTATE TOE(S) BILATERAL;  Surgeon: Yelitza Elise, DPM, Pod;  Location: [facility] OR     ARTHROPLASTY KNEE  2008    left     ARTHROPLASTY KNEE  1/3/2012    Procedure:ARTHROPLASTY KNEE; Right Total Knee Arthroplasty,       C APPENDECTOMY  1954     C TOTAL ABDOM HYSTERECTOMY  1972    ROCK/BSO     FUSION THORACIC LUMBAR ANTERIOR TWO LEVELS  5/10/2014    Procedure: FUSION THORACIC LUMBAR ANTERIOR TWO LEVELS;  Surgeon: Tomás Worthy MD;  Location: [facility] OR      FLEX SIGMOIDOSCOPY W/WO NATALIE SPEC BY BRUSH/WASH  9/00    colon screen- Flex by Dr. Rosario      REMOVE TONSILS/ADENOIDS,12+ Y/O  ~1938     LIGATN/STRIP LONG & SHORT SAPHEN  2005    RFA of right vein     OPTICAL TRACKING SYSTEM FUSION POSTERIOR SPINE LUMBAR  5/10/2014    Procedure: OPTICAL TRACKING SYSTEM FUSION SPINE POSTERIOR LUMBAR ONE LEVEL;  Surgeon: Tomás Worthy MD;  Location: [facility] OR     REPAIR HAMMER TOE  4/23/2014    Procedure: REPAIR HAMMER TOE;  Surgeon: Gosia  Yelitza HYDE DPM, Pod;  Location:  OR               Social History:     Social History     Social History     Marital status:      Spouse name: N/A     Number of children: 4     Years of education: N/A     Occupational History     Not on file.     Social History Main Topics     Smoking status: Never Smoker     Smokeless tobacco: Never Used     Alcohol use No     Drug use: No     Sexual activity: No     Other Topics Concern     Blood Transfusions No     Caffeine Concern No     only when out to eat     Special Diet Yes     1500 mg Calcium + dietary calcium     Exercise Yes     3 times a week- walk     Seat Belt Yes     Self-Exams Yes     fibrocystic     Parent/Sibling W/ Cabg, Mi Or Angioplasty Before 65f 55m? Yes     Social History Narrative               Family History:     Family History   Problem Relation Age of Onset     C.A.D. Brother       at 67     C.A.D. Father      CHF     C.A.D. Maternal Uncle      C.A.D. Maternal Uncle      Blood Disease Brother      type of Leukemia      Daughter      Sjogren's              Allergies:      Allergies   Allergen Reactions     Trospium Swelling     Lower extremity edema     Codeine      dry mouth and nausea     Penicillins      dry mouth     Sulfa Drugs      dry mouth               Medications:     Prior to Admission medications    Medication Sig Last Dose Taking? Auth Provider   acetaminophen (TYLENOL) 325 MG tablet Take 2 tablets (650 mg) by mouth every 4 hours as needed for mild pain   Donte Palma, DO   aspirin 81 MG EC tablet Take 1 tablet (81 mg) by mouth daily  Patient taking differently: Take 81 mg by mouth every evening    Rhonda Aj MD   Calcium Carbonate-Vitamin D (CALCIUM + D) 600-200 MG-UNIT per tablet Take 1 tablet by mouth 2 times daily    Reported, Patient   estradiol (VAGIFEM) 10 MCG TABS vaginal tablet INSERT 1 TABLET VAGINALLY TWICE WEEKLY  Patient taking differently: INSERT 1 TABLET VAGINALLY TWICE WEEKLY Tues/Fri   Jean Marie  "Rhonda Pratt MD   fexofenadine (ALLEGRA) 180 MG tablet Take 180 mg by mouth daily as needed for allergies   Unknown, Entered By History   fluticasone (FLONASE) 50 MCG/ACT spray INSTILL 2 SPRAYS INTO BOTH NOSTRILS ONCE DAILY   Rhonda Aj MD   furosemide (LASIX) 40 MG tablet Take 1 tablet (40 mg) by mouth daily   Donte Palma DO   gabapentin (NEURONTIN) 100 MG capsule 200 mg in the morning and 100 mg at noon  200 mg in the evening   Rhonda Aj MD   guaiFENesin-dextromethorphan (ROBITUSSIN DM) 100-10 MG/5ML syrup Take 5 mLs by mouth every 4 hours as needed for cough   Donte Palma DO   latanoprost (XALATAN) 0.005 % ophthalmic solution Place 1 drop into both eyes At Bedtime    Reported, Patient   lisinopril (PRINIVIL/ZESTRIL) 20 MG tablet Take 1 tablet (20 mg) by mouth 2 times daily   Donte Palma DO   multivitamin, therapeutic with minerals (MULTI-VITAMIN) TABS tablet Take 1 tablet by mouth daily   Unknown, Entered By History   omeprazole (PRILOSEC) 20 MG CR capsule TAKE 1 CAPSULE EVERY MORNING DAILY   Rhonda Aj MD   timolol (TIMOPTIC) 0.5 % ophthalmic solution Place 1 drop into both eyes daily   Travis Calixto MD   VITAMIN D 1000 UNIT PO CAPS 2 CAPSULE EVERY DAY                 Review of Systems:   A Comprehensive greater than 10 system review of systems was carried out.  Pertinent positives and negatives are noted above.  Otherwise negative for contributory information.            Physical Exam:   Blood pressure 132/64, pulse 116, temperature 97.5  F (36.4  C), temperature source Oral, resp. rate 14, height 1.626 m (5' 4\"), weight 63.5 kg (140 lb), SpO2 96 %, not currently breastfeeding.  Exam:  GENERAL:  Comfortable.  PSYCH: pleasant, oriented, No acute distress.  HEENT:  PERRLA. Normal conjunctiva, normal hearing, nasal mucosa and Oropharynx are normal.  NECK:  Supple, no neck vein distention, adenopathy or bruits, normal thyroid.  HEART:  " Normal S1, S2 with no murmur, no pericardial rub, gallops or S3 or S4.  LUNGS:  Clear to auscultation, normal Respiratory effort. No wheezing, rales or ronchi.  ABDOMEN:  Soft, no hepatosplenomegaly, normal bowel sounds. Non-tender, non distended.   EXTREMITIES:  No pedal edema, +2 pulses bilateral and equal. Bilateral lower extremity weakness with lifting legs equally.  SKIN:  Dry to touch, No rash, wound or ulcerations.  NEUROLOGIC:  CN 2-12 grossly intact,  sensation is intact with no focal deficits.               Data:       Recent Labs  Lab 12/01/18  1226   WBC 8.9   HGB 13.7   HCT 42.7   MCV 93          Recent Labs  Lab 12/01/18  1226      POTASSIUM 4.0   CHLORIDE 104   CO2 29   ANIONGAP 6   *   BUN 14   CR 0.81   GFRESTIMATED 66   GFRESTBLACK 80   MAKI 9.2   MAG 1.8       Recent Labs  Lab 12/01/18  1408   COLOR Yellow   APPEARANCE Clear   URINEGLC Negative   URINEBILI Negative   URINEKETONE 5*   SG 1.010   UBLD Negative   URINEPH 5.0   PROTEIN Negative   NITRITE Negative   LEUKEST Negative   RBCU 1   WBCU <1         Recent Results (from the past 24 hour(s))   MR Head w/o Contrast Angiogram    Narrative    MR ANGIOGRAM OF THE HEAD WITHOUT CONTRAST   12/1/2018 5:46 PM     HISTORY: Dizziness, lower extremity weakness.     TECHNIQUE:  3D time-of-flight MR angiogram of the head without  contrast.    COMPARISON: MRA 11/7/2012.    FINDINGS: There is focal short segment loss of the expected  flow-related enhancement within the proximal left vertebral artery V4  segment. The vertebral arteries, basilar artery, and posterior  cerebral arteries are otherwise patent. The internal carotid arteries,  anterior cerebral arteries, and middle cerebral arteries are patent.  No aneurysms or vascular malformations are identified.      Impression    IMPRESSION: Short segment loss of flow-related enhancement within the  proximal left vertebral artery V4 segment, likely representing slow  flow, similar compared to  2012.     KOURTNEY JONES MD   MR Brain w/o & w Contrast    Narrative    MRI BRAIN WITHOUT AND WITH CONTRAST  12/1/2018 6:09 PM    HISTORY:  Dizziness, lower extremity weakness.       TECHNIQUE:  Multiplanar, multisequence MRI of the brain without and  with 10 mL Gadavist.    COMPARISON: Head CT 11/16/2018.    FINDINGS:  Moderate volume loss is present. Extensive periventricular  and subcortical frontoparietal predominant T2 FLAIR hyperintensity  likely represents chronic small vessel ischemic change, advanced for  age. No evidence of acute ischemia, hemorrhage, mass, mass effect, or  hydrocephalus. No abnormal enhancement or diffusion restriction is  identified. The visualized calvarium, skull base, paranasal sinuses  are unremarkable. Bilateral lens replacements are present. 8 mm nodule  within the right inferior parotid gland like represents benign  intraparotid lymph node.      Impression    IMPRESSION:  1. No evidence of acute ischemia or hemorrhage.  2. Extensive/confluent white matter T2 hyperintensity which likely  represents chronic small vessel ischemic change, advanced for age.      KOURTNEY JONES MD   MR Neck w/o & w Contrast Angiogram    Narrative    MRA NECK WITHOUT AND WITH CONTRAST  12/1/2018 6:09 PM     HISTORY: Dizziness, lower extremity weakness.     TECHNIQUE: 2D time-of-flight MR angiogram of the neck without contrast  and 3D MR angiogram of the neck with  10 mL Gadavist. Estimates of  carotid stenoses are made relative to the distal internal carotid  artery diameters except as noted.    COMPARISON: None.    FINDINGS:    Normal origin of the great vessels from the aortic arch.    Right carotid artery: The right common and internal carotid arteries  are patent. No significant stenosis.      Left carotid artery: The left common and internal carotid arteries are  patent. No significant stenosis.      Vertebral arteries: There is short segment loss of expected  flow-related enhancement on  time-of-flight MRA, however contrast fills  the V4 segment on contrast enhanced MRA. There is mild atherosclerotic  irregularity. Otherwise, the vertebral arteries appear patent without  evidence of dissection. No significant stenosis.        Impression    IMPRESSION:  Normal MR angiogram of the neck.  Left vertebral artery  V4 segment is patent, confirmed on contrast enhanced MRA.     MD Lidya STEINER PA-C

## 2018-12-02 NOTE — ED PROVIDER NOTES
I took signout from my partner Dr. Mosqueda.  Patient's final disposition was pending depending upon findings from MRI.  Her status has not changed while in the department.  She remains weak with attempts to sit up or get up from bed.  MRI results showed no acute process.  Plan will be to admit patient to the observation unit.  There is also some concern about the correct level of care for patient currently at home.    Recent Results (from the past 24 hour(s))   MR Head w/o Contrast Angiogram    Narrative    MR ANGIOGRAM OF THE HEAD WITHOUT CONTRAST   12/1/2018 5:46 PM     HISTORY: Dizziness, lower extremity weakness.     TECHNIQUE:  3D time-of-flight MR angiogram of the head without  contrast.    COMPARISON: MRA 11/7/2012.    FINDINGS: There is focal short segment loss of the expected  flow-related enhancement within the proximal left vertebral artery V4  segment. The vertebral arteries, basilar artery, and posterior  cerebral arteries are otherwise patent. The internal carotid arteries,  anterior cerebral arteries, and middle cerebral arteries are patent.  No aneurysms or vascular malformations are identified.      Impression    IMPRESSION: Short segment loss of flow-related enhancement within the  proximal left vertebral artery V4 segment, likely representing slow  flow, similar compared to 2012.     KOURTNEY JONES MD   MR Brain w/o & w Contrast    Narrative    MRI BRAIN WITHOUT AND WITH CONTRAST  12/1/2018 6:09 PM    HISTORY:  Dizziness, lower extremity weakness.       TECHNIQUE:  Multiplanar, multisequence MRI of the brain without and  with 10 mL Gadavist.    COMPARISON: Head CT 11/16/2018.    FINDINGS:  Moderate volume loss is present. Extensive periventricular  and subcortical frontoparietal predominant T2 FLAIR hyperintensity  likely represents chronic small vessel ischemic change, advanced for  age. No evidence of acute ischemia, hemorrhage, mass, mass effect, or  hydrocephalus. No abnormal enhancement or  diffusion restriction is  identified. The visualized calvarium, skull base, paranasal sinuses  are unremarkable. Bilateral lens replacements are present. 8 mm nodule  within the right inferior parotid gland like represents benign  intraparotid lymph node.      Impression    IMPRESSION:  1. No evidence of acute ischemia or hemorrhage.  2. Extensive/confluent white matter T2 hyperintensity which likely  represents chronic small vessel ischemic change, advanced for age.      KOURTNEY JONES MD   MR Neck w/o & w Contrast Angiogram    Narrative    MRA NECK WITHOUT AND WITH CONTRAST  12/1/2018 6:09 PM     HISTORY: Dizziness, lower extremity weakness.     TECHNIQUE: 2D time-of-flight MR angiogram of the neck without contrast  and 3D MR angiogram of the neck with  10 mL Gadavist. Estimates of  carotid stenoses are made relative to the distal internal carotid  artery diameters except as noted.    COMPARISON: None.    FINDINGS:    Normal origin of the great vessels from the aortic arch.    Right carotid artery: The right common and internal carotid arteries  are patent. No significant stenosis.      Left carotid artery: The left common and internal carotid arteries are  patent. No significant stenosis.      Vertebral arteries: There is short segment loss of expected  flow-related enhancement on time-of-flight MRA, however contrast fills  the V4 segment on contrast enhanced MRA. There is mild atherosclerotic  irregularity. Otherwise, the vertebral arteries appear patent without  evidence of dissection. No significant stenosis.        Impression    IMPRESSION:  Normal MR angiogram of the neck.  Left vertebral artery  V4 segment is patent, confirmed on contrast enhanced MRA.     MD Wilber STEINER Jason David, APRN CNP  12/01/18 [id number]

## 2018-12-02 NOTE — PROGRESS NOTES
Medically stable. Still weak and needs assist of 2.   D/w family, will need TCU for the short term and likely will need to plan for LTC    [facility]  Hospitalist Progress Note  Alize Middleton PA-C 12/02/2018    Reason for Stay (Diagnosis): generalized weakness         Assessment and Plan:      Delmis Quarles is a 89 year old female with a PMH significant for neuropathy, bilateral knee arthroplasty, hypertension, and history of gastric ulcer/esophageal stricture who presents with weakness and dizziness.     1. Generalized weakness, dec mobility.   This is progressive weakness. There is no focal weakness. No pain with back or radiculopathy to suggest worsening spinal stenosis. In speaking with family and per outpt Rehab, they believe that this is her new baseline.   At present she is an assist of 2, unable to go back to [facility] even with increased services, d/w family, will likely need TCU transition till she either improves to a level where she can be back at [facility] or will need LTC.   Family is aware and will find funds for self pay TCU.   -Referrals sent today.     2. Dizziness-has a hx of vertigo and chronic dizziness. Initial concern for stroke so she had an MRI brain and MRA head/neck which were all negative.  Those symptoms have since improved but still feels slightly symptomatic with movement.   Othos negative. Cont gentle fluids overnight and Will give trial of scheduled Meclizine      3.  Hypertension  She has a history of being on Bumex and Spironolactone when she was admitted to [facility] in August 2018.  These were discontinued and she was discharged on lisinopril with Lasix.   Has not had either since arrival to hospital.   -Resume Lisinopril, hold Lasix for now but likely can discharge IVF tonight and resume lasix tomorrow.   PRN labetalol     4.  History of gastric ulcer  Continue omeprazole     5. BLE neuopathy: states has hx of neuropathy and is on Gabapentin. Will hold for now  "given c/o dizziness and in the setting of advanced age. Likely can consider dec or weaning off med.      Social: Lives independently and ambulates with walker or scooter. Decreasing mobility likley new baseline. Needs increased services, likely LTC or NH. Plan for TCU till long term solution can be arranged per family.   Code: Discussed with patient and they have chosen DNR/DNI  VTE prophylaxis: Lovenox  Disposition: Remain in Observation        Interval History (Subjective):      States feeling ok but just overall weak and still a little dizzy and off balance when she is walking. Family states that it can be baseline.   O/w no other complaints. No appetite. Last BM yesterday.                   Physical Exam:      Last Vital Signs:  /90  Pulse 116  Temp 98.5  F (36.9  C) (Oral)  Resp 16  Ht 1.626 m (5' 4\")  Wt 63.5 kg (140 lb)  SpO2 96%  BMI 24.03 kg/m2      Constitutional: Awake, alert, cooperative, no apparent distress   Respiratory: Clear to auscultation bilaterally, no crackles or wheezing   Cardiovascular: Regular rate and rhythm, normal S1 and S2, and no murmur noted   Abdomen: Normal bowel sounds, soft, non-distended, non-tender   Skin: No rashes, no cyanosis, dry to touch   Neuro: Alert and oriented x3, no weakness, numbness, memory loss   Extremities: No edema, normal range of motion   Other(s):        All other systems: Negative          Medications:      All current medications were reviewed with changes reflected in problem list.         Data:      All new lab and imaging data was reviewed.   Labs:       Lab Results   Component Value Date     12/02/2018     12/01/2018     08/21/2018    Lab Results   Component Value Date    CHLORIDE 105 12/02/2018    CHLORIDE 104 12/01/2018    CHLORIDE 101 08/21/2018    Lab Results   Component Value Date    BUN 13 12/02/2018    BUN 14 12/01/2018    BUN 16 08/21/2018      Lab Results   Component Value Date    POTASSIUM 3.5 12/02/2018    " POTASSIUM 4.0 12/01/2018    POTASSIUM 4.2 08/21/2018    Lab Results   Component Value Date    CO2 26 12/02/2018    CO2 29 12/01/2018    CO2 26 08/21/2018    Lab Results   Component Value Date    CR 0.79 12/02/2018    CR 0.81 12/01/2018    CR 0.75 08/21/2018          Recent Labs  Lab 12/02/18  0622 12/01/18  1226   WBC 6.1 8.9   HGB 12.5 13.7   HCT 39.0 42.7   MCV 93 93    239       Recent Labs  Lab 12/01/18  1226   TROPI 0.030       Recent Labs  Lab 12/01/18  1408   COLOR Yellow   APPEARANCE Clear   URINEGLC Negative   URINEBILI Negative   URINEKETONE 5*   SG 1.010   UBLD Negative   URINEPH 5.0   PROTEIN Negative   NITRITE Negative   LEUKEST Negative   RBCU 1   WBCU <1      Imaging:   Results for orders placed or performed during the hospital encounter of 12/01/18   MR Brain w/o & w Contrast    Narrative    MRI BRAIN WITHOUT AND WITH CONTRAST  12/1/2018 6:09 PM    HISTORY:  Dizziness, lower extremity weakness.       TECHNIQUE:  Multiplanar, multisequence MRI of the brain without and  with 10 mL Gadavist.    COMPARISON: Head CT 11/16/2018.    FINDINGS:  Moderate volume loss is present. Extensive periventricular  and subcortical frontoparietal predominant T2 FLAIR hyperintensity  likely represents chronic small vessel ischemic change, advanced for  age. No evidence of acute ischemia, hemorrhage, mass, mass effect, or  hydrocephalus. No abnormal enhancement or diffusion restriction is  identified. The visualized calvarium, skull base, paranasal sinuses  are unremarkable. Bilateral lens replacements are present. 8 mm nodule  within the right inferior parotid gland like represents benign  intraparotid lymph node.      Impression    IMPRESSION:  1. No evidence of acute ischemia or hemorrhage.  2. Extensive/confluent white matter T2 hyperintensity which likely  represents chronic small vessel ischemic change, advanced for age.      KOURTNEY JONES MD   MR Head w/o Contrast Angiogram    Narrative    MR ANGIOGRAM OF  THE HEAD WITHOUT CONTRAST   12/1/2018 5:46 PM     HISTORY: Dizziness, lower extremity weakness.     TECHNIQUE:  3D time-of-flight MR angiogram of the head without  contrast.    COMPARISON: MRA 11/7/2012.    FINDINGS: There is focal short segment loss of the expected  flow-related enhancement within the proximal left vertebral artery V4  segment. The vertebral arteries, basilar artery, and posterior  cerebral arteries are otherwise patent. The internal carotid arteries,  anterior cerebral arteries, and middle cerebral arteries are patent.  No aneurysms or vascular malformations are identified.      Impression    IMPRESSION: Short segment loss of flow-related enhancement within the  proximal left vertebral artery V4 segment, likely representing slow  flow, similar compared to 2012.     KOURTNEY JONES MD   MR Neck w/o & w Contrast Angiogram    Narrative    MRA NECK WITHOUT AND WITH CONTRAST  12/1/2018 6:09 PM     HISTORY: Dizziness, lower extremity weakness.     TECHNIQUE: 2D time-of-flight MR angiogram of the neck without contrast  and 3D MR angiogram of the neck with  10 mL Gadavist. Estimates of  carotid stenoses are made relative to the distal internal carotid  artery diameters except as noted.    COMPARISON: None.    FINDINGS:    Normal origin of the great vessels from the aortic arch.    Right carotid artery: The right common and internal carotid arteries  are patent. No significant stenosis.      Left carotid artery: The left common and internal carotid arteries are  patent. No significant stenosis.      Vertebral arteries: There is short segment loss of expected  flow-related enhancement on time-of-flight MRA, however contrast fills  the V4 segment on contrast enhanced MRA. There is mild atherosclerotic  irregularity. Otherwise, the vertebral arteries appear patent without  evidence of dissection. No significant stenosis.        Impression    IMPRESSION:  Normal MR angiogram of the neck.  Left vertebral  artery  V4 segment is patent, confirmed on contrast enhanced MRA.     KOURTNEY JONES MD

## 2018-12-02 NOTE — ED NOTES
"[facility]  ED Nurse Handoff Report    Delmis Quarles is a 89 year old female   ED Chief complaint: Fall  . ED Diagnosis:   Final diagnoses:   Generalized muscle weakness   Dizziness     Allergies:   Allergies   Allergen Reactions     Trospium Swelling     Lower extremity edema     Codeine      dry mouth and nausea     Penicillins      dry mouth     Sulfa Drugs      dry mouth       Code Status: DNR / DNI  Activity level - Baseline/Home:  Stand with Assist. Activity Level - Current Stand with 2, Complains of dizziness.  Lift room needed: Yes Bariatric: NO   Needed: No   Isolation: No. Infection: Not Applicable.     Vital Signs:   Vitals:    12/01/18 1600 12/01/18 1615 12/01/18 1630 12/01/18 1813   BP: 189/87 171/80 162/76 167/89   Pulse:       Resp:       Temp:       TempSrc:       SpO2: 90%   93%       Cardiac Rhythm:  ,      Pain level:    Patient confused: No. Patient Falls Risk: Yes.   Elimination Status: Has voided   Patient Report - Patient lives at independent living center. States that she was feeling weak and legs \"gave out and went weak\" and she lowered herself to the ground to prevent fall. Denies injury, states that BP was high on scene. Focused Assessment: Pt attempted an ambulation trial. Pt was unable to do so due to a complaint of dizziness while sitting. Pt stated that dizziness didn't go away until she laid back down. Patients daughter also noted shaking of upper extremities which daughter claimed was not consistent with pt baseline. Pt stated she did not \"feel safe trying to stand. Musculoskeletal - Musculoskeletal WDL: all General Mobility: generalized weakness (Patient states that she had to \"lower myself to the floor, both my legs went weak and I felt dizzy\" Also states that BP was high at the time of incident. Patient denies pain or injury related to \"fall\". States that she uses assistive devies for mobility ) Side: Bilateral Pain Body Location: leg  Tests Performed: "   MR Brain w/o & w Contrast   Final Result   IMPRESSION:   1. No evidence of acute ischemia or hemorrhage.   2. Extensive/confluent white matter T2 hyperintensity which likely   represents chronic small vessel ischemic change, advanced for age.        KOURTNEY JONES MD      MR Neck w/o & w Contrast Angiogram   Final Result   IMPRESSION:  Normal MR angiogram of the neck.  Left vertebral artery   V4 segment is patent, confirmed on contrast enhanced MRA.       KOURTNEY JONES MD      MR Head w/o Contrast Angiogram   Final Result   IMPRESSION: Short segment loss of flow-related enhancement within the   proximal left vertebral artery V4 segment, likely representing slow   flow, similar compared to 2012.       KOURTNEY JONES MD      . Abnormal Results:   Labs Ordered and Resulted from Time of ED Arrival Up to the Time of Departure from the ED   BASIC METABOLIC PANEL - Abnormal; Notable for the following:        Result Value    Glucose 112 (*)     All other components within normal limits   ROUTINE UA WITH MICROSCOPIC - Abnormal; Notable for the following:     Ketones Urine 5 (*)     Squamous Epithelial /HPF Urine 5 (*)     Mucous Urine Present (*)     All other components within normal limits   CBC WITH PLATELETS DIFFERENTIAL   MAGNESIUM   TROPONIN I   PERIPHERAL IV CATHETER   URINE CULTURE AEROBIC BACTERIAL       Treatments provided: SEE MAR  Family Comments: Family at bedside and aware of admission to observation unit.   OBS brochure/video discussed/provided to patient:  Yes  ED Medications:   Medications   sodium chloride (PF) 0.9% PF flush 60 mL (60 mLs Intravenous Given 12/1/18 1750)   gadobutrol (GADAVIST) injection 10 mL (10 mLs Intravenous Given 12/1/18 1749)     Drips infusing:  No  For the majority of the shift, the patient's behavior Green. Interventions performed were NA     Severe Sepsis OR Septic Shock Diagnosis Present: No      ED Nurse Name/Phone Number: Holly A Fall,   6:26 PM      RECEIVING UNIT ED  HANDOFF REVIEW    Above ED Nurse Handoff Report was reviewed: Yes  Reviewed by: Marilee Anguiano on December 1, 2018 at 7:50 PM

## 2018-12-02 NOTE — PLAN OF CARE
Problem: Patient Care Overview  Goal: Plan of Care/Patient Progress Review  Outcome: No Change  PRIMARY DIAGNOSIS: VERTIGO    OUTPATIENT/OBSERVATION GOALS TO BE MET BEFORE DISCHARGE  1. Orthostatic performed: No, Patient too weak to get orthostatics at this time.     2. Completion of appropriate imaging: Yes    3. Tolerating PO medications: Yes    4. Return to near baseline physical activity: No    5. Cleared for discharge by consultants (if involved): No    Patient alert and oriented x4. Patient denies pain at this time. Patient denies dizziness when laying in bed with head slightly elevated. Purewhick in place due to dizziness when up. LR running at 100 mL/hr. Regular diet. Patient tolerating water. PT and SW consulted for tomorrow.     Discharge Planner Nurse   Safe discharge environment identified: Yes  Barriers to discharge: No       Entered by: Marilee Anguiano 12/01/2018 9:56 PM     Please review provider order for any additional goals.   Nurse to notify provider when observation goals have been met and patient is ready for discharge.

## 2018-12-02 NOTE — PLAN OF CARE
Problem: Patient Care Overview  Goal: Plan of Care/Patient Progress Review  Outcome: Improving  PRIMARY DIAGNOSIS: GENERALIZED WEAKNESS    OUTPATIENT/OBSERVATION GOALS TO BE MET BEFORE DISCHARGE  1. Orthostatic performed: Yes:          Lying Orthostatic BP: 177/76         Sitting Orthostatic BP: 208/98         Standing Orthostatic BP: 197/105     2. Tolerating PO medications: Has not had any PO meds during her stay but pt has been able to eat and drink normally    3. Return to near baseline physical activity: No, pt up with an assist x2    4. Cleared for discharge by consultants (if involved): No, PT and SW consults pending    Discharge Planner Nurse   Safe discharge environment identified: No, pt lives in independent senior living by herself  Barriers to discharge: Yes, pending consults       Entered by: Keren Austin 12/02/2018 9:25 AM     Please review provider order for any additional goals.   Nurse to notify provider when observation goals have been met and patient is ready for discharge.    VSS on RA. LR @ 100 ml/hr. Up w/assist x2. Positive orthos. Pt reported dizziness when she sat up and was standing. Tolerating regular diet. Incontinent. Plan: monitor for dizziness, SW and PT consults

## 2018-12-02 NOTE — PROGRESS NOTES
12/02/18 0950   Quick Adds   Type of Visit Initial PT Evaluation   Living Environment   Lives With alone   Living Arrangements independent living facility   Home Accessibility no concerns   Number of Stairs to Enter Home 0   Number of Stairs Within Home 0   Stair Railings at Home none   Self-Care   Dominant Hand right   Usual Activity Tolerance moderate   Current Activity Tolerance poor   Regular Exercise yes   Activity/Exercise Type other (see comments)  (Completes exercises given to her from PT)   Exercise Amount/Frequency daily   Equipment Currently Used at Home walker, rolling;other (see comments)  (power scooter)   Functional Level Prior   Ambulation 1-->assistive equipment   Transferring 1-->assistive equipment   Toileting 0-->independent   Bathing 3-->assistive equipment and person   Dressing 0-->independent   Eating 0-->independent   Fall history within last six months yes   Number of times patient has fallen within last six months 2  (Maybe more than this, difficult to assess via history)   Which of the above functional risks had a recent onset or change? ambulation;transferring;toileting;bathing;dressing;fall history   General Information   Onset of Illness/Injury or Date of Surgery - Date 12/01/18   Referring Physician Lidya Quarles PA-C   Patient/Family Goals Statement Return home with assist of family   Pertinent History of Current Problem (include personal factors and/or comorbidities that impact the POC) Delmis Quarles is a 89 year old female with a PMH significant for neuropathy, bilateral knee arthroplasty, hypertension, and history of gastric ulcer/esophageal stricture who presents with weakness and dizziness.   Precautions/Limitations fall precautions   Weight-Bearing Status - LLE full weight-bearing   Weight-Bearing Status - RLE full weight-bearing   General Observations Pt supine upon intiation, agreeable to session.    Cognitive Status Examination   Orientation orientation to  "person, place and time   Level of Consciousness alert   Follows Commands and Answers Questions 100% of the time   Personal Safety and Judgment intact   Memory intact   Pain Assessment   Patient Currently in Pain No   Integumentary/Edema   Integumentary/Edema no deficits were identifed   Posture    Posture Forward head position;Protracted shoulders   Range of Motion (ROM)   ROM Comment LE ROM WNL, limited L shoulder ROM 2/2 rotator cuff disorder   Strength   Strength Comments Unable to SLR B LE   Bed Mobility   Bed Mobility Comments sit<>supine modA   Transfer Skills   Transfer Comments sit<>stand modA with FWW   Gait   Gait Comments ~2' at bedside with FWW and min-modA   Balance   Balance Comments Requires B UE support for safe dynamic mobility   Sensory Examination   Sensory Perception Comments Pt has neuropathy to B LE and hands   Coordination   Coordination no deficits were identified   Muscle Tone   Muscle Tone no deficits were identified   Clinical Impression   Criteria for Skilled Therapeutic Intervention yes, treatment indicated   PT Diagnosis Impaired functional mobility   Influenced by the following impairments weakness, dizziness   Functional limitations due to impairments Difficulty with bed mobility, transfers, ambulation, stairs   Clinical Presentation Stable/Uncomplicated   Clinical Presentation Rationale progressing as expected medically   Clinical Decision Making (Complexity) Low complexity   Therapy Frequency` other (see comments)  (Eval only)   Predicted Duration of Therapy Intervention (days/wks) Eval only   Anticipated Discharge Disposition Transitional Care Facility   Risk & Benefits of therapy have been explained Yes   Patient, Family & other staff in agreement with plan of care Yes   [facility] AM-PAC TM \"6 Clicks\"   2016, Trustees of [facility], under license to [employer].  All rights reserved.   6 Clicks Short Forms Basic Mobility Inpatient Short Form   [facility] " "AM-PAC  \"6 Clicks\" V.2 Basic Mobility Inpatient Short Form   1. Turning from your back to your side while in a flat bed without using bedrails? 3 - A Little   2. Moving from lying on your back to sitting on the side of a flat bed without using bedrails? 2 - A Lot   3. Moving to and from a bed to a chair (including a wheelchair)? 2 - A Lot   4. Standing up from a chair using your arms (e.g., wheelchair, or bedside chair)? 2 - A Lot   5. To walk in hospital room? 2 - A Lot   6. Climbing 3-5 steps with a railing? 1 - Total   Basic Mobility Raw Score (Score out of 24.Lower scores equate to lower levels of function) 12   Total Evaluation Time   Total Evaluation Time (Minutes) 16     "

## 2018-12-02 NOTE — PLAN OF CARE
Problem: Patient Care Overview  Goal: Plan of Care/Patient Progress Review  Outcome: Improving  PRIMARY DIAGNOSIS: GENERALIZED WEAKNESS     OUTPATIENT/OBSERVATION GOALS TO BE MET BEFORE DISCHARGE  1. Orthostatic performed: Yes:          Lying Orthostatic BP: 177/76         Sitting Orthostatic BP: 208/98         Standing Orthostatic BP: 197/105      2. Tolerating PO medications: Has not had any PO meds during her stay but pt has been able to eat and drink normally     3. Return to near baseline physical activity: No, pt up with an assist x2     4. Cleared for discharge by consultants (if involved): No, EDWIN working on placement     Discharge Planner Nurse   Safe discharge environment identified: No, pt lives in independent senior living by herself  Barriers to discharge: Yes, pending placement         Please review provider order for any additional goals.   Nurse to notify provider when observation goals have been met and patient is ready for discharge.     VSS on RA. LR has been discontinued, saline locked. Up w/assist x2. Positive orthos. Pt reported dizziness while in beed. Tolerating regular diet. Incontinent. Plan: monitor for dizziness, placement, get pt up in chair this afternoon

## 2018-12-03 ENCOUNTER — APPOINTMENT (OUTPATIENT)
Dept: GENERAL RADIOLOGY | Facility: CLINIC | Age: 83
DRG: 689 | End: 2018-12-03
Attending: PHYSICIAN ASSISTANT
Payer: MEDICARE

## 2018-12-03 PROBLEM — J18.9 PNEUMONIA: Status: ACTIVE | Noted: 2018-12-03

## 2018-12-03 LAB
BACTERIA SPEC CULT: ABNORMAL
GRAM STN SPEC: ABNORMAL
Lab: ABNORMAL
Lab: ABNORMAL
SPECIMEN SOURCE: ABNORMAL
SPECIMEN SOURCE: ABNORMAL

## 2018-12-03 PROCEDURE — 87205 SMEAR GRAM STAIN: CPT | Performed by: PHYSICIAN ASSISTANT

## 2018-12-03 PROCEDURE — 71046 X-RAY EXAM CHEST 2 VIEWS: CPT

## 2018-12-03 PROCEDURE — 25000128 H RX IP 250 OP 636: Performed by: PHYSICIAN ASSISTANT

## 2018-12-03 PROCEDURE — 25000132 ZZH RX MED GY IP 250 OP 250 PS 637: Mod: GY | Performed by: PHYSICIAN ASSISTANT

## 2018-12-03 PROCEDURE — 87077 CULTURE AEROBIC IDENTIFY: CPT | Performed by: PHYSICIAN ASSISTANT

## 2018-12-03 PROCEDURE — 12000000 ZZH R&B MED SURG/OB

## 2018-12-03 PROCEDURE — 99207 ZZC CDG-CODE CATEGORY CHANGED: CPT | Performed by: PHYSICIAN ASSISTANT

## 2018-12-03 PROCEDURE — 87185 SC STD ENZYME DETCJ PER NZM: CPT | Performed by: PHYSICIAN ASSISTANT

## 2018-12-03 PROCEDURE — A9270 NON-COVERED ITEM OR SERVICE: HCPCS | Mod: GY | Performed by: PHYSICIAN ASSISTANT

## 2018-12-03 PROCEDURE — G0378 HOSPITAL OBSERVATION PER HR: HCPCS

## 2018-12-03 PROCEDURE — 99225 ZZC SUBSEQUENT OBSERVATION CARE,LEVEL II: CPT | Performed by: PHYSICIAN ASSISTANT

## 2018-12-03 PROCEDURE — 87070 CULTURE OTHR SPECIMN AEROBIC: CPT | Performed by: PHYSICIAN ASSISTANT

## 2018-12-03 RX ORDER — GUAIFENESIN 600 MG/1
600 TABLET, EXTENDED RELEASE ORAL 2 TIMES DAILY PRN
Status: DISCONTINUED | OUTPATIENT
Start: 2018-12-03 | End: 2018-12-04 | Stop reason: HOSPADM

## 2018-12-03 RX ORDER — CEFTRIAXONE 2 G/1
2 INJECTION, POWDER, FOR SOLUTION INTRAMUSCULAR; INTRAVENOUS EVERY 24 HOURS
Status: DISCONTINUED | OUTPATIENT
Start: 2018-12-03 | End: 2018-12-04 | Stop reason: HOSPADM

## 2018-12-03 RX ORDER — GABAPENTIN 100 MG/1
100 CAPSULE ORAL DAILY
Status: DISCONTINUED | OUTPATIENT
Start: 2018-12-04 | End: 2018-12-04 | Stop reason: HOSPADM

## 2018-12-03 RX ORDER — BENZONATATE 100 MG/1
100 CAPSULE ORAL 3 TIMES DAILY PRN
Status: DISCONTINUED | OUTPATIENT
Start: 2018-12-03 | End: 2018-12-04 | Stop reason: HOSPADM

## 2018-12-03 RX ORDER — GABAPENTIN 100 MG/1
200 CAPSULE ORAL 2 TIMES DAILY
Status: DISCONTINUED | OUTPATIENT
Start: 2018-12-03 | End: 2018-12-04 | Stop reason: HOSPADM

## 2018-12-03 RX ORDER — AZITHROMYCIN 250 MG/1
250 TABLET, FILM COATED ORAL DAILY
Status: DISCONTINUED | OUTPATIENT
Start: 2018-12-04 | End: 2018-12-04 | Stop reason: HOSPADM

## 2018-12-03 RX ADMIN — MECLIZINE HYDROCHLORIDE 25 MG: 25 TABLET ORAL at 08:22

## 2018-12-03 RX ADMIN — MECLIZINE HYDROCHLORIDE 25 MG: 25 TABLET ORAL at 19:59

## 2018-12-03 RX ADMIN — CEFTRIAXONE SODIUM 2 G: 2 INJECTION, POWDER, FOR SOLUTION INTRAMUSCULAR; INTRAVENOUS at 18:55

## 2018-12-03 RX ADMIN — ENOXAPARIN SODIUM 40 MG: 40 INJECTION SUBCUTANEOUS at 19:59

## 2018-12-03 RX ADMIN — FUROSEMIDE 40 MG: 40 TABLET ORAL at 08:22

## 2018-12-03 RX ADMIN — MECLIZINE HYDROCHLORIDE 25 MG: 25 TABLET ORAL at 14:21

## 2018-12-03 RX ADMIN — GABAPENTIN 200 MG: 100 CAPSULE ORAL at 19:59

## 2018-12-03 RX ADMIN — ASPIRIN 81 MG: 81 TABLET, COATED ORAL at 19:59

## 2018-12-03 RX ADMIN — OMEPRAZOLE 20 MG: 20 CAPSULE, DELAYED RELEASE ORAL at 08:22

## 2018-12-03 RX ADMIN — LISINOPRIL 20 MG: 20 TABLET ORAL at 19:58

## 2018-12-03 RX ADMIN — LISINOPRIL 20 MG: 20 TABLET ORAL at 08:22

## 2018-12-03 RX ADMIN — AZITHROMYCIN MONOHYDRATE 500 MG: 500 INJECTION, POWDER, LYOPHILIZED, FOR SOLUTION INTRAVENOUS at 15:59

## 2018-12-03 ASSESSMENT — ACTIVITIES OF DAILY LIVING (ADL)
FALL_HISTORY_WITHIN_LAST_SIX_MONTHS: YES
WHICH_OF_THE_ABOVE_FUNCTIONAL_RISKS_HAD_A_RECENT_ONSET_OR_CHANGE?: AMBULATION;TRANSFERRING;TOILETING;BATHING;DRESSING;FALL HISTORY
NUMBER_OF_TIMES_PATIENT_HAS_FALLEN_WITHIN_LAST_SIX_MONTHS: 2
TRANSFERRING: 1-->ASSISTIVE EQUIPMENT
SWALLOWING: 0-->SWALLOWS FOODS/LIQUIDS WITHOUT DIFFICULTY
RETIRED_COMMUNICATION: 0-->UNDERSTANDS/COMMUNICATES WITHOUT DIFFICULTY
AMBULATION: 1-->ASSISTIVE EQUIPMENT
COGNITION: 0 - NO COGNITION ISSUES REPORTED
TOILETING: 0-->INDEPENDENT
BATHING: 3-->ASSISTIVE EQUIPMENT AND PERSON
DRESS: 0-->INDEPENDENT
RETIRED_EATING: 0-->INDEPENDENT

## 2018-12-03 NOTE — PLAN OF CARE
Problem: Patient Care Overview  Goal: Plan of Care/Patient Progress Review  PRIMARY DIAGNOSIS: GENERALIZED WEAKNESS    OUTPATIENT/OBSERVATION GOALS TO BE MET BEFORE DISCHARGE  1. Orthostatic performed: No    2. Tolerating PO medications: Yes    3. Return to near baseline physical activity: No    4. Cleared for discharge by consultants (if involved): N/A    Discharge Planner Nurse   Safe discharge environment identified: No  Barriers to discharge: Yes       Entered by: Jessie Cabrales 12/02/2018 9:06 PM     Bp elevated- lisinopril given, A&O x3- disoriented to place, assist x2, IVF @ 100, denies pain, having some dizziness while sitting up in bed- meclizine given, will continue to monitor         Please review provider order for any additional goals.   Nurse to notify provider when observation goals have been met and patient is ready for discharge.

## 2018-12-03 NOTE — PLAN OF CARE
Problem: Patient Care Overview  Goal: Plan of Care/Patient Progress Review  Outcome: Declining  A&Ox3. Baseline numbness/tingling in BUE/BLE per pt, CMS otherwise intact. [place]. Up w/ Ax2, gait belt and walker. Denies dizziness this AM. Denies pain. BS activex4, tolerating regular diet well, passing flatus. Incontinent of urine. Voiding in adequate amts. Was originally going to discharge to [facility] this evening, pt. family stated pt. was having increased coughing. Chest x-ray done at 1:30 PM, results showed left lower lobe pneumonia - to start on IV Rocephin and Zithromax as well as prn Tessalon pearls and Mucinex. Sputum sample to be collected. Will continue to monitor.

## 2018-12-03 NOTE — PROGRESS NOTES
[facility]  Hospitalist Progress Note  Lidya Quarles PA-C 12/03/2018    Reason for Stay (Diagnosis): Weakness and pneumonia         Assessment and Plan:      Delmis Quarles is a 89 year old female with a PMH significant for neuropathy, bilateral knee arthroplasty, hypertension, and history of gastric ulcer/esophageal stricture who presents with weakness and dizziness.    Addendum: I went to family to discuss discharge planning and patient was complaining of persistent cough. Lungs are clear with no fever or elevation in WBC. CXR done and shows left lower lobe pneumonia. Patient will start Rocephin with Azithromycin and tessalon pearls with Mucinex. Reassess readiness for discharge tomorrow. Given this change in status transitioned patient to inpatient. Family has arranged for patient to go to long term care unless rehab is needed.      1. Generalized weakness, dec mobility.   This is progressive weakness. There is no focal weakness. No pain with back or radiculopathy to suggest worsening spinal stenosis. In speaking with family and per outpt Rehab, they believe that this is her new baseline.   At present she is an assist of 2, unable to go back to [facility] even with increased services.   -Discussed with family and they have secured a long term care bed at her current facility and will order PT/OT to see her there.       2. Dizziness-has a hx of vertigo and chronic dizziness. Initial concern for stroke so she had an MRI brain and MRA head/neck which were all negative.  Those symptoms have since improved but still feels slightly symptomatic with movement.   Othos negative.       3.  Hypertension  She has a history of being on Bumex and Spironolactone when she was admitted to [facility] in August 2018.  These were discontinued and she was discharged on lisinopril with Lasix. Initially not started on admission due to dizziness and weakness but pressures remained high.  -Resume Lisinopril and  "Lasix  PRN labetalol     4.  History of gastric ulcer  Continue omeprazole      5. BLE neuopathy: states has hx of neuropathy and is on Gabapentin. Continue for now (likely not contributing to dizziness as this has been a chronic problem).     Social: Lives independently and ambulates with walker or scooter. Decreasing mobility likley new baseline. Family has arranged long term care  Code: Discussed with patient and they have chosen DNR/DNI  VTE prophylaxis: Lovenox  Disposition: Remain in Observation          Interval History (Subjective):      Weakness the same. Complains of wet cough. No nausea, vomiting or diarrhea. No complaints of pain.                  Physical Exam:      Last Vital Signs:  /62 (BP Location: Right arm)  Pulse 84  Temp 98.3  F (36.8  C) (Oral)  Resp 20  Ht 1.626 m (5' 4\")  Wt 63.5 kg (140 lb)  SpO2 93%  BMI 24.03 kg/m2    No intake or output data in the 24 hours ending 12/03/18 1438    Constitutional: Awake, alert, cooperative, no apparent distress   Respiratory: Wet cough in room. Clear to auscultation bilaterally, no crackles or wheezing   Cardiovascular: Regular rate and rhythm, normal S1 and S2, and no murmur noted   Abdomen: Normal bowel sounds, soft, non-distended, non-tender   Skin: No rashes, no cyanosis, dry to touch   Neuro: Alert and oriented x3, generalized weakness, lower leg weakness,  No memory loss   Extremities: No edema, normal range of motion   Other(s):        All other systems: Negative          Medications:      All current medications were reviewed with changes reflected in problem list.         Data:      All new lab and imaging data was reviewed.   Labs:    Recent Labs  Lab 12/02/18  0622      POTASSIUM 3.5   CHLORIDE 105   CO2 26   ANIONGAP 8   *   BUN 13   CR 0.79   GFRESTIMATED 68   GFRESTBLACK 82   MAKI 8.8       Recent Labs  Lab 12/02/18  0622   WBC 6.1   HGB 12.5   HCT 39.0   MCV 93         Imaging:   Recent Results (from the past " 24 hour(s))   XR Chest 2 Views    Narrative    CHEST TWO VIEWS 12/3/2018 1:37 PM     HISTORY: Productive cough.     COMPARISON: 8/17/2018    FINDINGS: Left lower lobe airspace opacity best demonstrated on the  lateral view. No pneumothorax or significant pleural effusion. Heart  size within normal limits. Severe left shoulder degenerative change  noted.       Impression    IMPRESSION: Probable left lower lobe pneumonia.     FREDERICK SALCEDO MD

## 2018-12-03 NOTE — PLAN OF CARE
"Problem: Patient Care Overview  Goal: Plan of Care/Patient Progress Review  Outcome: Improving  PRIMARY DIAGNOSIS: GENERALIZED WEAKNESS    OUTPATIENT/OBSERVATION GOALS TO BE MET BEFORE DISCHARGE  1. Orthostatic performed: Yes:          Lying Orthostatic BP: 177/76         Sitting Orthostatic BP: 208/98         Standing Orthostatic BP: 197/105     2. Tolerating PO medications: Yes    3. Return to near baseline physical activity: No    4. Cleared for discharge by consultants (if involved): N/A    Discharge Planner Nurse   Safe discharge environment identified: No  Barriers to discharge: No       Entered by: Sera Umanzor 12/03/2018 8:39 AM     /80 (BP Location: Right arm)  Pulse 68  Temp 97.5  F (36.4  C) (Oral)  Resp 16  Ht 1.626 m (5' 4\")  Wt 63.5 kg (140 lb)  SpO2 95%  BMI 24.03 kg/m2    A&Ox3. Baseline numbness/tingling in BUE/BLE per pt, CMS otherwise intact. [place].Up w/ Ax2, gait belt and walker. Denies dizziness this AM. Denies pain. BS activex4, tolerating regular diet well, passing flatus. Incontinent. Voiding in adequate amts. Will continue to monitor.      Please review provider order for any additional goals.   Nurse to notify provider when observation goals have been met and patient is ready for discharge.      "

## 2018-12-03 NOTE — PLAN OF CARE
"Problem: Patient Care Overview  Goal: Plan of Care/Patient Progress Review  Outcome: No Change  PRIMARY DIAGNOSIS: GENERALIZED WEAKNESS    OUTPATIENT/OBSERVATION GOALS TO BE MET BEFORE DISCHARGE  1. Orthostatic performed: Yes:          Lying Orthostatic BP: 177/76         Sitting Orthostatic BP: 208/98         Standing Orthostatic BP: 197/105     2. Tolerating PO medications: Yes    3. Return to near baseline physical activity: No    4. Cleared for discharge by consultants (if involved): Yes    Discharge Planner Nurse   Safe discharge environment identified: Yes  Barriers to discharge: No       Entered by: Sera Umanzor 12/03/2018 11:54 AM     /80 (BP Location: Right arm)  Pulse 68  Temp 97.5  F (36.4  C) (Oral)  Resp 16  Ht 1.626 m (5' 4\")  Wt 63.5 kg (140 lb)  SpO2 95%  BMI 24.03 kg/m2    A&Ox3. Baseline numbness/tingling in BUE/BLE per pt, CMS otherwise intact. [place].Up w/ Ax2, gait belt and walker. Denies dizziness this AM. Denies pain. BS activex4, tolerating regular diet well, passing flatus. Incontinent. Voiding in adequate amts. Discharge to [facility] this evening - daughter will provide transportation. Will continue to monitor.     Please review provider order for any additional goals.   Nurse to notify provider when observation goals have been met and patient is ready for discharge.      "

## 2018-12-03 NOTE — PROGRESS NOTES
EDWIN spoke with [facility] admissions who reported that they are able to accept the pt if pt/family agreeable to $5000 down payment and private pay cost.     EDWIN met with pt, pts daughter and son-in-law to discuss planning. Pt and daughter in agreement that pt in need of LTC at this time and will not be able to return to [facility]. Pts daughter reported that she had already spoken to [facility] and they told her they have two private rooms in the LTC and can take pt. She reported that she plans to go tour those rooms to decide which one would be best for the pt. Pt in agreement and reports that she is ready for discharge and wants to go to [facility]. Discussed $5000 down payment and pt and family stated understanding and agreement. Pts daughter stated that they would transport the pt at discharge. They also report wanting therapy for the pt in the LTC at least for some time. Discussed plan for discharge this afternoon, pts daughter planning to update with a time.    EDWIN spoke with [facility] who confirmed they are able to place pt in LTC bed and accept her today. EDWIN informed MD.  10:16 AM    EDWIN completed PAS: [id number]  10:25 AM    EDWIN faxed discharge orders.  11:24 AM    EDWIN spoke with charge RN. She reported that pts daughter is planning to transport pt at 1:30 and that she had already updated [facility] with this information.  12:35 PM    EDWIN informed that pt will stay tonight. EDWIN updated [facility]. Pts daughter, Denise, reports plan to transport pt in the morning tomorrow.  2:37 PM

## 2018-12-03 NOTE — PLAN OF CARE
Problem: Patient Care Overview  Goal: Plan of Care/Patient Progress Review  Outcome: No Change  PRIMARY DIAGNOSIS: GENERALIZED WEAKNESS      OUTPATIENT/OBSERVATION GOALS TO BE MET BEFORE DISCHARGE  1. Orthostatic performed: No      2. Tolerating PO medications: Yes      3. Return to near baseline physical activity: No      4. Cleared for discharge by consultants (if involved): N/A      Discharge Planner Nurse   Safe discharge environment identified: No  Barriers to discharge: Yes       Entered by: Rajani Sheridan 12/03/2018       VSS. A&Ox4. Denies pain. Moving w/ assist x 2 due to patient report of dizziness. Denies dizziness at rest. IVF infusing. Incontinent x 2 overnight. SW following.           Please review provider order for any additional goals.   Nurse to notify provider when observation goals have been met and patient is ready for discharge.

## 2018-12-03 NOTE — PLAN OF CARE
Problem: Patient Care Overview  Goal: Plan of Care/Patient Progress Review  Outcome: Improving  PRIMARY DIAGNOSIS: GENERALIZED WEAKNESS     OUTPATIENT/OBSERVATION GOALS TO BE MET BEFORE DISCHARGE  1. Orthostatic performed: No     2. Tolerating PO medications: Yes     3. Return to near baseline physical activity: No     4. Cleared for discharge by consultants (if involved): N/A     Discharge Planner Nurse   Safe discharge environment identified: No  Barriers to discharge: Yes       Entered by: Rajani Sheridan 12/03/2018      VSS - /74. A&Ox4. Denies pain. Moving w/ assist x 2 due to patient report of dizziness. Denies dizziness at rest.  IVF infusing @ 100. d        Please review provider order for any additional goals.   Nurse to notify provider when observation goals have been met and patient is ready for discharge.

## 2018-12-04 VITALS
RESPIRATION RATE: 20 BRPM | OXYGEN SATURATION: 96 % | HEART RATE: 100 BPM | SYSTOLIC BLOOD PRESSURE: 145 MMHG | DIASTOLIC BLOOD PRESSURE: 63 MMHG | TEMPERATURE: 98.3 F | BODY MASS INDEX: 23.9 KG/M2 | HEIGHT: 64 IN | WEIGHT: 140 LBS

## 2018-12-04 LAB
BASOPHILS # BLD AUTO: 0.1 10E9/L (ref 0–0.2)
BASOPHILS NFR BLD AUTO: 1.5 %
DIFFERENTIAL METHOD BLD: NORMAL
EOSINOPHIL # BLD AUTO: 0.7 10E9/L (ref 0–0.7)
EOSINOPHIL NFR BLD AUTO: 8.6 %
ERYTHROCYTE [DISTWIDTH] IN BLOOD BY AUTOMATED COUNT: 13.8 % (ref 10–15)
HCT VFR BLD AUTO: 41.3 % (ref 35–47)
HGB BLD-MCNC: 13.6 G/DL (ref 11.7–15.7)
IMM GRANULOCYTES # BLD: 0.1 10E9/L (ref 0–0.4)
IMM GRANULOCYTES NFR BLD: 0.6 %
LYMPHOCYTES # BLD AUTO: 1.9 10E9/L (ref 0.8–5.3)
LYMPHOCYTES NFR BLD AUTO: 24.2 %
MCH RBC QN AUTO: 30.3 PG (ref 26.5–33)
MCHC RBC AUTO-ENTMCNC: 32.9 G/DL (ref 31.5–36.5)
MCV RBC AUTO: 92 FL (ref 78–100)
MONOCYTES # BLD AUTO: 0.8 10E9/L (ref 0–1.3)
MONOCYTES NFR BLD AUTO: 10.3 %
NEUTROPHILS # BLD AUTO: 4.4 10E9/L (ref 1.6–8.3)
NEUTROPHILS NFR BLD AUTO: 54.8 %
NRBC # BLD AUTO: 0 10*3/UL
NRBC BLD AUTO-RTO: 0 /100
PLATELET # BLD AUTO: 234 10E9/L (ref 150–450)
PLATELET # BLD AUTO: 238 10E9/L (ref 150–450)
RBC # BLD AUTO: 4.49 10E12/L (ref 3.8–5.2)
WBC # BLD AUTO: 7.9 10E9/L (ref 4–11)

## 2018-12-04 PROCEDURE — 85049 AUTOMATED PLATELET COUNT: CPT | Performed by: HOSPITALIST

## 2018-12-04 PROCEDURE — 85025 COMPLETE CBC W/AUTO DIFF WBC: CPT | Performed by: HOSPITALIST

## 2018-12-04 PROCEDURE — 36415 COLL VENOUS BLD VENIPUNCTURE: CPT | Performed by: HOSPITALIST

## 2018-12-04 PROCEDURE — 99217 ZZC OBSERVATION CARE DISCHARGE: CPT | Performed by: HOSPITALIST

## 2018-12-04 PROCEDURE — 25000132 ZZH RX MED GY IP 250 OP 250 PS 637: Mod: GY | Performed by: PHYSICIAN ASSISTANT

## 2018-12-04 PROCEDURE — 99207 ZZC CDG-CODE CATEGORY CHANGED: CPT | Performed by: HOSPITALIST

## 2018-12-04 PROCEDURE — A9270 NON-COVERED ITEM OR SERVICE: HCPCS | Mod: GY | Performed by: PHYSICIAN ASSISTANT

## 2018-12-04 RX ORDER — AZITHROMYCIN 250 MG/1
250 TABLET, FILM COATED ORAL DAILY
Qty: 6 TABLET | DISCHARGE
Start: 2018-12-05 | End: 2018-12-08

## 2018-12-04 RX ORDER — BENZONATATE 100 MG/1
100 CAPSULE ORAL 3 TIMES DAILY PRN
Qty: 42 CAPSULE | DISCHARGE
Start: 2018-12-04 | End: 2024-01-01

## 2018-12-04 RX ORDER — CEFDINIR 300 MG/1
300 CAPSULE ORAL 2 TIMES DAILY
Qty: 16 CAPSULE | Refills: 0 | DISCHARGE
Start: 2018-12-04 | End: 2018-12-12

## 2018-12-04 RX ORDER — GUAIFENESIN 600 MG/1
600 TABLET, EXTENDED RELEASE ORAL 2 TIMES DAILY PRN
Qty: 28 TABLET | DISCHARGE
Start: 2018-12-04 | End: 2024-01-01

## 2018-12-04 RX ADMIN — GABAPENTIN 200 MG: 100 CAPSULE ORAL at 08:17

## 2018-12-04 RX ADMIN — AZITHROMYCIN MONOHYDRATE 250 MG: 250 TABLET ORAL at 08:17

## 2018-12-04 RX ADMIN — LISINOPRIL 20 MG: 20 TABLET ORAL at 08:17

## 2018-12-04 RX ADMIN — FUROSEMIDE 40 MG: 40 TABLET ORAL at 08:17

## 2018-12-04 RX ADMIN — LATANOPROST 1 DROP: 50 SOLUTION/ DROPS OPHTHALMIC at 00:39

## 2018-12-04 RX ADMIN — OMEPRAZOLE 20 MG: 20 CAPSULE, DELAYED RELEASE ORAL at 08:17

## 2018-12-04 RX ADMIN — MECLIZINE HYDROCHLORIDE 25 MG: 25 TABLET ORAL at 08:17

## 2018-12-04 NOTE — PLAN OF CARE
Problem: Patient Care Overview  Goal: Plan of Care/Patient Progress Review  Outcome: Adequate for Discharge Date Met: 12/04/18  Patient's After Visit Summary was reviewed with patient and/or Daughter.   Patient verbalized understanding of After Visit Summary, recommended follow up and was given an opportunity to ask questions.   Discharge medications sent home with patient/family: Not applicable, Info faxed to LTC   Discharged with transport tech via stretcher    OBSERVATION patient END time: 1156

## 2018-12-04 NOTE — PLAN OF CARE
Problem: Patient Care Overview  Goal: Plan of Care/Patient Progress Review  Vitals:wnl  Labs: chest x-ray+ pneumonia, sputum and gram stain pending, MRI neg, urine culture pending   Neuro: alert and oriented but forgetful   GI: incontinent of urine   : incontinent of stool- 1 loose stool from 3 to 7   Skin: wnl  Activity: x2 kiran steady, was up in chair this shift   Diet: regular   Pain: denies   Plan: IV zithromax and rochephin for +chest x-ray, PT recommended TCU, was accepted to [facility] today

## 2018-12-04 NOTE — DISCHARGE SUMMARY
[facility]  Discharge Summary  Name: Delmis Quarles    MRN: [id number]  YOB: 1924    Age: 89 year old  Date of Discharge:  12/4/2018  Date of Admission: 12/1/2018  Primary Care Provider: Sherry Monteiro  Discharge Physician:  Joon Erazo MD  Discharging Service:  Hospitalist  Date of Service (when I saw the patient): 12/04/18    Discharge Diagnosis:  Weakness, urinary tract infection, pneumonia     Other Diagnosis:  neuropathy, bilateral knee arthroplasty, hypertension, and history of gastric ulcer/esophageal stricture      Discharge Disposition:  Discharged to short-term care facility     Allergies:  Allergies   Allergen Reactions     Trospium Swelling     Lower extremity edema     Codeine      dry mouth and nausea     Penicillins      dry mouth     Sulfa Drugs      dry mouth        Discharge Medications:   Current Discharge Medication List      START taking these medications    Details   azithromycin (ZITHROMAX) 250 MG tablet Take 1 tablet (250 mg) by mouth daily for 3 days  Qty: 6 tablet    Associated Diagnoses: Pneumonia of right lower lobe due to infectious organism (H)      benzonatate (TESSALON) 100 MG capsule Take 1 capsule (100 mg) by mouth 3 times daily as needed for cough  Qty: 42 capsule    Associated Diagnoses: Pneumonia of right lower lobe due to infectious organism (H)      cefdinir (OMNICEF) 300 MG capsule Take 1 capsule (300 mg) by mouth 2 times daily for 8 days  Qty: 16 capsule, Refills: 0    Associated Diagnoses: Pneumonia of right lower lobe due to infectious organism (H)      guaiFENesin (MUCINEX) 600 MG 12 hr tablet Take 1 tablet (600 mg) by mouth 2 times daily as needed for congestion  Qty: 28 tablet    Associated Diagnoses: Pneumonia of right lower lobe due to infectious organism (H)         CONTINUE these medications which have NOT CHANGED    Details   acetaminophen (TYLENOL) 325 MG tablet Take 2 tablets (650 mg) by mouth every 4 hours as needed for mild  pain  Qty: 30 tablet, Refills: 0    Associated Diagnoses: Cough      aspirin 81 MG EC tablet Take 1 tablet (81 mg) by mouth daily  Qty: 90 tablet, Refills: 3    Associated Diagnoses: Hypertension      Calcium Carbonate-Vitamin D (CALCIUM + D) 600-200 MG-UNIT per tablet Take 1 tablet by mouth 2 times daily       fexofenadine (ALLEGRA) 180 MG tablet Take 180 mg by mouth daily as needed for allergies      fluticasone (FLONASE) 50 MCG/ACT spray INSTILL 2 SPRAYS INTO BOTH NOSTRILS ONCE DAILY  Qty: 16 mL, Refills: 0    Comments: Office visit needed  Associated Diagnoses: Post-nasal drainage      furosemide (LASIX) 40 MG tablet Take 1 tablet (40 mg) by mouth daily  Qty: 180 tablet, Refills: 1    Associated Diagnoses: Cough      gabapentin (NEURONTIN) 100 MG capsule 200 mg in the morning and 100 mg at noon  200 mg in the evening  Qty: 450 capsule, Refills: 1    Associated Diagnoses: Neuropathy      guaiFENesin-dextromethorphan (ROBITUSSIN DM) 100-10 MG/5ML syrup Take 5 mLs by mouth every 4 hours as needed for cough  Qty: 560 mL, Refills: 0    Associated Diagnoses: Cough      latanoprost (XALATAN) 0.005 % ophthalmic solution Place 1 drop into both eyes At Bedtime       lisinopril (PRINIVIL/ZESTRIL) 20 MG tablet Take 1 tablet (20 mg) by mouth 2 times daily  Qty: 30 tablet, Refills: 0    Associated Diagnoses: Cough      multivitamin, therapeutic with minerals (MULTI-VITAMIN) TABS tablet Take 1 tablet by mouth daily      omeprazole (PRILOSEC) 20 MG CR capsule TAKE 1 CAPSULE EVERY MORNING DAILY  Qty: 90 capsule, Refills: 0    Comments: Due for office visit for any further refills  Associated Diagnoses: Stricture and stenosis of esophagus      timolol (TIMOPTIC) 0.5 % ophthalmic solution Place 1 drop into both eyes daily      Vitamin D, Cholecalciferol, 1000 units TABS Take 2,000 Units by mouth daily      estradiol (VAGIFEM) 10 MCG TABS vaginal tablet INSERT 1 TABLET VAGINALLY TWICE WEEKLY  Qty: 24 tablet, Refills: 0     "Associated Diagnoses: Atrophic vaginitis              Condition on Discharge:  Discharge condition: Stable   Discharge vitals: Blood pressure 129/60, pulse 84, temperature 97.6  F (36.4  C), temperature source Oral, resp. rate 16, height 1.626 m (5' 4\"), weight 63.5 kg (140 lb), SpO2 92 %, not currently breastfeeding.   Code status on discharge: DNR / DNI     History of Illness:  See detailed admission note for full details.   89 year old female who presents with bilateral leg weakness.  Per the patient she has become progressively more weak over the last few months that she relates to her peripheral neuropathy.  She uses a walker and scooter as needed.  Today she attempted to stand but could not and actually slid to the floor.  She was unable to get up from the ground and called EMS.  She reports issues with intermittent lightheadedness or dizziness but did not have any today when she slid to the ground.  In the ED they ruled out infection and metabolic derangement.  She was then road tested but felt like the room was spinning.  At that time she had MRI and MRA imaging of her brain which was negative for stroke.  She now feels like those symptoms have resolved.  Per chart review it seems like her family has been concerned about her declining health and considering increasing her level of care.  She reports no recent illnesses but has had a cough with no shortness of breath, chest discomfort, syncope, abdominal pain, nausea, vomiting, diarrhea or lower leg swelling.  She has not started any new medications and has been taking everything as prescribed.  She reports a good appetite and has been eating/drinking well      Significant Physical Exam Findings:  Patient in bed. Having cough. States she feels pretty good. No pain  s1s2 rrr, lungs clear    Procedures:  none     Imaging:  Results for orders placed or performed during the hospital encounter of 12/01/18   MR Brain w/o & w Contrast    Narrative    MRI BRAIN " WITHOUT AND WITH CONTRAST  12/1/2018 6:09 PM    HISTORY:  Dizziness, lower extremity weakness.       TECHNIQUE:  Multiplanar, multisequence MRI of the brain without and  with 10 mL Gadavist.    COMPARISON: Head CT 11/16/2018.    FINDINGS:  Moderate volume loss is present. Extensive periventricular  and subcortical frontoparietal predominant T2 FLAIR hyperintensity  likely represents chronic small vessel ischemic change, advanced for  age. No evidence of acute ischemia, hemorrhage, mass, mass effect, or  hydrocephalus. No abnormal enhancement or diffusion restriction is  identified. The visualized calvarium, skull base, paranasal sinuses  are unremarkable. Bilateral lens replacements are present. 8 mm nodule  within the right inferior parotid gland like represents benign  intraparotid lymph node.      Impression    IMPRESSION:  1. No evidence of acute ischemia or hemorrhage.  2. Extensive/confluent white matter T2 hyperintensity which likely  represents chronic small vessel ischemic change, advanced for age.      KOURTNEY JONES MD   MR Head w/o Contrast Angiogram    Narrative    MR ANGIOGRAM OF THE HEAD WITHOUT CONTRAST   12/1/2018 5:46 PM     HISTORY: Dizziness, lower extremity weakness.     TECHNIQUE:  3D time-of-flight MR angiogram of the head without  contrast.    COMPARISON: MRA 11/7/2012.    FINDINGS: There is focal short segment loss of the expected  flow-related enhancement within the proximal left vertebral artery V4  segment. The vertebral arteries, basilar artery, and posterior  cerebral arteries are otherwise patent. The internal carotid arteries,  anterior cerebral arteries, and middle cerebral arteries are patent.  No aneurysms or vascular malformations are identified.      Impression    IMPRESSION: Short segment loss of flow-related enhancement within the  proximal left vertebral artery V4 segment, likely representing slow  flow, similar compared to 2012.     KOURTNEY JONES MD   MR Neck w/o & w  Contrast Angiogram    Narrative    MRA NECK WITHOUT AND WITH CONTRAST  12/1/2018 6:09 PM     HISTORY: Dizziness, lower extremity weakness.     TECHNIQUE: 2D time-of-flight MR angiogram of the neck without contrast  and 3D MR angiogram of the neck with  10 mL Gadavist. Estimates of  carotid stenoses are made relative to the distal internal carotid  artery diameters except as noted.    COMPARISON: None.    FINDINGS:    Normal origin of the great vessels from the aortic arch.    Right carotid artery: The right common and internal carotid arteries  are patent. No significant stenosis.      Left carotid artery: The left common and internal carotid arteries are  patent. No significant stenosis.      Vertebral arteries: There is short segment loss of expected  flow-related enhancement on time-of-flight MRA, however contrast fills  the V4 segment on contrast enhanced MRA. There is mild atherosclerotic  irregularity. Otherwise, the vertebral arteries appear patent without  evidence of dissection. No significant stenosis.        Impression    IMPRESSION:  Normal MR angiogram of the neck.  Left vertebral artery  V4 segment is patent, confirmed on contrast enhanced MRA.     KOURTNEY JONES MD   XR Chest 2 Views    Narrative    CHEST TWO VIEWS 12/3/2018 1:37 PM     HISTORY: Productive cough.     COMPARISON: 8/17/2018    FINDINGS: Left lower lobe airspace opacity best demonstrated on the  lateral view. No pneumothorax or significant pleural effusion. Heart  size within normal limits. Severe left shoulder degenerative change  noted.       Impression    IMPRESSION: Probable left lower lobe pneumonia.     FREDERICK SALCEDO MD        Consultations:  No consultations were requested during this admission.     Significant Lab Results:    Recent Labs  Lab 12/04/18  0902 12/04/18  0638 12/02/18  0622 12/01/18  1226   WBC 7.9  --  6.1 8.9   HGB 13.6  --  12.5 13.7   HCT 41.3  --  39.0 42.7   MCV 92  --  93 93    234 233 239           Lab Results   Component Value Date     12/02/2018     12/01/2018     08/21/2018    Lab Results   Component Value Date    CHLORIDE 105 12/02/2018    CHLORIDE 104 12/01/2018    CHLORIDE 101 08/21/2018    Lab Results   Component Value Date    BUN 13 12/02/2018    BUN 14 12/01/2018    BUN 16 08/21/2018      Lab Results   Component Value Date    POTASSIUM 3.5 12/02/2018    POTASSIUM 4.0 12/01/2018    POTASSIUM 4.2 08/21/2018    Lab Results   Component Value Date    CO2 26 12/02/2018    CO2 29 12/01/2018    CO2 26 08/21/2018    Lab Results   Component Value Date    CR 0.79 12/02/2018    CR 0.81 12/01/2018    CR 0.75 08/21/2018           Pending Results:    Unresulted Labs Ordered in the Past 30 Days of this Admission     Date and Time Order Name Status Description    12/4/2018 0848 CBC with platelets differential In process     12/3/2018 1435 Sputum Culture Aerobic Bacterial In process            Discharge Instructions and Follow-Up:   Discharge diet:   Active Diet Order      Regular Diet Adult      Advance Diet as Tolerated   Discharge activity: Activity as tolerated   Discharge follow-up: Follow up with primary care provider in 7 days   Outpatient therapy: None    Home Care agency: None    Other instructions: None      Hospital Course:  Patient was admitted to the Obs Unit. She was found to have a UTI and Pneumonia. She was started on antibiotics. She was admitted to inpatient status. She is stable and doing well. She will discharge to TCU with possible transition to long-term care. Daughter was called and kept updated.      Total time spent in face to face contact with the patient and coordinating discharge was:  40 Minutes.    Joon Erazo MD  Pager: [phone]

## 2018-12-04 NOTE — PLAN OF CARE
Problem: Patient Care Overview  Goal: Plan of Care/Patient Progress Review  Outcome: No Change  Vitals: Temp: 98.3  F (36.8  C) Temp src: Oral BP: 145/63 Pulse: 100 Heart Rate: 70 Resp: 20 SpO2: 96 % O2 Device: None (Room air)    Labs: Chest x-ray: pneumonia  Neuro:A&Ox4 forgetful  Lungs:Infrequent productive cough. Pt reported improvement. LS clear- PO zitromax for pneumonia  Cardiac:WDL  GI/: WDL/Incontinent, UTI, Denies pain with urination, IV rocephin for UTI  Skin:WDL  Diet:Regular, denies N/V  Pain:Denies  Activity:Pt unable to bear weight. Attempted to use kiran steady 2 ast. Unable to stand.   Plan:Discharge to [facility] with PO antibiotics via HE stretcher.

## 2018-12-04 NOTE — PLAN OF CARE
Problem: Patient Care Overview  Goal: Plan of Care/Patient Progress Review  Vitals: VSS. Temp: 98.4  F (36.9  C) Temp src: Oral BP: 115/57 Heart Rate: 73 Resp: 18 SpO2: 94 % O2 Device: None (Room air)    Labs: Sputum culture came back with abnormal results. MD paged, no new orders or interventions. Cxray showed pneumonia.   Neuro: WDL, A&O, but forgetful.   GI/: WDL, Pure wick in place for incontinence.   Skin: WDL.   Activity: A2 with kiran steady.   Diet: regular   Pain: Denies    Plan: IV ABX for pneumonia treatment. Pt does have infrequent cough. Lung sounds clear but diminished. Pt is incontinent baseline, pure wick placed. Meclizine for previous dizziness. Denies at this time. Possible for pt to go to TCU today per PT/SW.

## 2018-12-04 NOTE — PROGRESS NOTES
Discharge Planner   Discharge Plans in progress: Yes  Barriers to discharge plan: Per care team rounds patient able to discharge today. Called admissions at [facility] to confirm that patient would discharge today. Per notes family to transport.   Follow up plan: Will update [facility] of discharge time and fax orders when complete.        Entered by: Birdie Aguilar 12/04/2018 9:32 AM     Update: 0950 Orders faxed to [facility].

## 2018-12-06 LAB
BACTERIA SPEC CULT: ABNORMAL
SPECIMEN SOURCE: ABNORMAL

## 2018-12-13 ENCOUNTER — RECORDS - HEALTHEAST (OUTPATIENT)
Dept: LAB | Facility: CLINIC | Age: 83
End: 2018-12-13

## 2018-12-13 LAB
ANION GAP SERPL CALCULATED.3IONS-SCNC: 11 MMOL/L (ref 5–18)
BUN SERPL-MCNC: 40 MG/DL (ref 8–28)
CALCIUM SERPL-MCNC: 10.2 MG/DL (ref 8.5–10.5)
CHLORIDE BLD-SCNC: 99 MMOL/L (ref 98–107)
CO2 SERPL-SCNC: 25 MMOL/L (ref 22–31)
CREAT SERPL-MCNC: 1.28 MG/DL (ref 0.6–1.1)
GFR SERPL CREATININE-BSD FRML MDRD: 39 ML/MIN/1.73M2
GLUCOSE BLD-MCNC: 91 MG/DL (ref 70–125)
POTASSIUM BLD-SCNC: 4.4 MMOL/L (ref 3.5–5)
SODIUM SERPL-SCNC: 135 MMOL/L (ref 136–145)

## 2019-01-02 ENCOUNTER — RECORDS - HEALTHEAST (OUTPATIENT)
Dept: LAB | Facility: CLINIC | Age: 84
End: 2019-01-02

## 2019-01-03 LAB
C DIFF TOX B STL QL: NEGATIVE
RIBOTYPE 027/NAP1/BI: NORMAL

## 2019-01-11 ENCOUNTER — RECORDS - HEALTHEAST (OUTPATIENT)
Dept: LAB | Facility: CLINIC | Age: 84
End: 2019-01-11

## 2019-01-11 LAB
ALBUMIN UR-MCNC: NEGATIVE MG/DL
APPEARANCE UR: CLEAR
BILIRUB UR QL STRIP: NEGATIVE
COLOR UR AUTO: NORMAL
GLUCOSE UR STRIP-MCNC: NEGATIVE MG/DL
HGB UR QL STRIP: NEGATIVE
KETONES UR STRIP-MCNC: NEGATIVE MG/DL
LEUKOCYTE ESTERASE UR QL STRIP: NEGATIVE
NITRATE UR QL: NEGATIVE
PH UR STRIP: 5.5 [PH] (ref 4.5–8)
SP GR UR STRIP: 1.01 (ref 1–1.03)
UROBILINOGEN UR STRIP-ACNC: NORMAL

## 2019-03-05 ENCOUNTER — RECORDS - HEALTHEAST (OUTPATIENT)
Dept: LAB | Facility: CLINIC | Age: 84
End: 2019-03-05

## 2019-03-06 LAB
BASOPHILS # BLD AUTO: 0 THOU/UL (ref 0–0.2)
BASOPHILS NFR BLD AUTO: 1 % (ref 0–2)
EOSINOPHIL # BLD AUTO: 0.2 THOU/UL (ref 0–0.4)
EOSINOPHIL NFR BLD AUTO: 4 % (ref 0–6)
ERYTHROCYTE [DISTWIDTH] IN BLOOD BY AUTOMATED COUNT: 14.2 % (ref 11–14.5)
HCT VFR BLD AUTO: 30.7 % (ref 35–47)
HGB BLD-MCNC: 10.1 G/DL (ref 12–16)
LYMPHOCYTES # BLD AUTO: 1.3 THOU/UL (ref 0.8–4.4)
LYMPHOCYTES NFR BLD AUTO: 23 % (ref 20–40)
MCH RBC QN AUTO: 28.8 PG (ref 27–34)
MCHC RBC AUTO-ENTMCNC: 32.9 G/DL (ref 32–36)
MCV RBC AUTO: 88 FL (ref 80–100)
MONOCYTES # BLD AUTO: 0.8 THOU/UL (ref 0–0.9)
MONOCYTES NFR BLD AUTO: 13 % (ref 2–10)
NEUTROPHILS # BLD AUTO: 3.3 THOU/UL (ref 2–7.7)
NEUTROPHILS NFR BLD AUTO: 59 % (ref 50–70)
PLATELET # BLD AUTO: 202 THOU/UL (ref 140–440)
PMV BLD AUTO: 9.4 FL (ref 8.5–12.5)
RBC # BLD AUTO: 3.51 MILL/UL (ref 3.8–5.4)
WBC: 5.7 THOU/UL (ref 4–11)

## 2019-04-16 ENCOUNTER — RECORDS - HEALTHEAST (OUTPATIENT)
Dept: LAB | Facility: CLINIC | Age: 84
End: 2019-04-16

## 2019-04-17 LAB
ANION GAP SERPL CALCULATED.3IONS-SCNC: 7 MMOL/L (ref 5–18)
BASOPHILS # BLD AUTO: 0.1 THOU/UL (ref 0–0.2)
BASOPHILS NFR BLD AUTO: 1 % (ref 0–2)
BUN SERPL-MCNC: 30 MG/DL (ref 8–28)
CALCIUM SERPL-MCNC: 9 MG/DL (ref 8.5–10.5)
CHLORIDE BLD-SCNC: 93 MMOL/L (ref 98–107)
CO2 SERPL-SCNC: 25 MMOL/L (ref 22–31)
CREAT SERPL-MCNC: 0.97 MG/DL (ref 0.6–1.1)
EOSINOPHIL # BLD AUTO: 0.4 THOU/UL (ref 0–0.4)
EOSINOPHIL NFR BLD AUTO: 8 % (ref 0–6)
ERYTHROCYTE [DISTWIDTH] IN BLOOD BY AUTOMATED COUNT: 14.5 % (ref 11–14.5)
GFR SERPL CREATININE-BSD FRML MDRD: 53 ML/MIN/1.73M2
GLUCOSE BLD-MCNC: 85 MG/DL (ref 70–125)
HCT VFR BLD AUTO: 30.6 % (ref 35–47)
HGB BLD-MCNC: 10.2 G/DL (ref 12–16)
LYMPHOCYTES # BLD AUTO: 1.4 THOU/UL (ref 0.8–4.4)
LYMPHOCYTES NFR BLD AUTO: 30 % (ref 20–40)
MCH RBC QN AUTO: 29.7 PG (ref 27–34)
MCHC RBC AUTO-ENTMCNC: 33.3 G/DL (ref 32–36)
MCV RBC AUTO: 89 FL (ref 80–100)
MONOCYTES # BLD AUTO: 0.6 THOU/UL (ref 0–0.9)
MONOCYTES NFR BLD AUTO: 12 % (ref 2–10)
NEUTROPHILS # BLD AUTO: 2.3 THOU/UL (ref 2–7.7)
NEUTROPHILS NFR BLD AUTO: 49 % (ref 50–70)
PLATELET # BLD AUTO: 220 THOU/UL (ref 140–440)
PMV BLD AUTO: 9.3 FL (ref 8.5–12.5)
POTASSIUM BLD-SCNC: 4.7 MMOL/L (ref 3.5–5)
RBC # BLD AUTO: 3.44 MILL/UL (ref 3.8–5.4)
SODIUM SERPL-SCNC: 125 MMOL/L (ref 136–145)
WBC: 4.7 THOU/UL (ref 4–11)

## 2019-04-28 ENCOUNTER — RECORDS - HEALTHEAST (OUTPATIENT)
Dept: LAB | Facility: CLINIC | Age: 84
End: 2019-04-28

## 2019-04-29 LAB — SODIUM SERPL-SCNC: 130 MMOL/L (ref 136–145)

## 2019-05-13 ENCOUNTER — RECORDS - HEALTHEAST (OUTPATIENT)
Dept: LAB | Facility: CLINIC | Age: 84
End: 2019-05-13

## 2019-05-14 LAB
ANION GAP SERPL CALCULATED.3IONS-SCNC: 6 MMOL/L (ref 5–18)
BUN SERPL-MCNC: 22 MG/DL (ref 8–28)
CALCIUM SERPL-MCNC: 9.3 MG/DL (ref 8.5–10.5)
CHLORIDE BLD-SCNC: 97 MMOL/L (ref 98–107)
CO2 SERPL-SCNC: 24 MMOL/L (ref 22–31)
CREAT SERPL-MCNC: 0.85 MG/DL (ref 0.6–1.1)
GFR SERPL CREATININE-BSD FRML MDRD: >60 ML/MIN/1.73M2
GLUCOSE BLD-MCNC: 85 MG/DL (ref 70–125)
POTASSIUM BLD-SCNC: 4.5 MMOL/L (ref 3.5–5)
SODIUM SERPL-SCNC: 127 MMOL/L (ref 136–145)

## 2019-09-06 ENCOUNTER — RECORDS - HEALTHEAST (OUTPATIENT)
Dept: LAB | Facility: CLINIC | Age: 84
End: 2019-09-06

## 2019-09-06 LAB
ANION GAP SERPL CALCULATED.3IONS-SCNC: 8 MMOL/L (ref 5–18)
BUN SERPL-MCNC: 20 MG/DL (ref 8–28)
CALCIUM SERPL-MCNC: 9.4 MG/DL (ref 8.5–10.5)
CHLORIDE BLD-SCNC: 100 MMOL/L (ref 98–107)
CO2 SERPL-SCNC: 27 MMOL/L (ref 22–31)
CREAT SERPL-MCNC: 1.16 MG/DL (ref 0.6–1.1)
ERYTHROCYTE [DISTWIDTH] IN BLOOD BY AUTOMATED COUNT: 14.4 % (ref 11–14.5)
GFR SERPL CREATININE-BSD FRML MDRD: 43 ML/MIN/1.73M2
GLUCOSE BLD-MCNC: 100 MG/DL (ref 70–125)
HBA1C MFR BLD: 5.6 % (ref 4.2–6.1)
HCT VFR BLD AUTO: 37 % (ref 35–47)
HGB BLD-MCNC: 11.7 G/DL (ref 12–16)
MCH RBC QN AUTO: 30.7 PG (ref 27–34)
MCHC RBC AUTO-ENTMCNC: 31.6 G/DL (ref 32–36)
MCV RBC AUTO: 97 FL (ref 80–100)
PLATELET # BLD AUTO: 255 THOU/UL (ref 140–440)
PMV BLD AUTO: 9.2 FL (ref 8.5–12.5)
POTASSIUM BLD-SCNC: 4.3 MMOL/L (ref 3.5–5)
RBC # BLD AUTO: 3.81 MILL/UL (ref 3.8–5.4)
SODIUM SERPL-SCNC: 135 MMOL/L (ref 136–145)
TSH SERPL DL<=0.005 MIU/L-ACNC: 0.9 UIU/ML (ref 0.3–5)
WBC: 7.2 THOU/UL (ref 4–11)

## 2019-10-11 ENCOUNTER — RECORDS - HEALTHEAST (OUTPATIENT)
Dept: LAB | Facility: CLINIC | Age: 84
End: 2019-10-11

## 2019-10-11 LAB
ANION GAP SERPL CALCULATED.3IONS-SCNC: 8 MMOL/L (ref 5–18)
BUN SERPL-MCNC: 16 MG/DL (ref 8–28)
CALCIUM SERPL-MCNC: 9.9 MG/DL (ref 8.5–10.5)
CHLORIDE BLD-SCNC: 102 MMOL/L (ref 98–107)
CO2 SERPL-SCNC: 24 MMOL/L (ref 22–31)
CREAT SERPL-MCNC: 0.92 MG/DL (ref 0.6–1.1)
GFR SERPL CREATININE-BSD FRML MDRD: 57 ML/MIN/1.73M2
GLUCOSE BLD-MCNC: 91 MG/DL (ref 70–125)
POTASSIUM BLD-SCNC: 4.6 MMOL/L (ref 3.5–5)
SODIUM SERPL-SCNC: 134 MMOL/L (ref 136–145)

## 2019-11-25 ENCOUNTER — RECORDS - HEALTHEAST (OUTPATIENT)
Dept: LAB | Facility: CLINIC | Age: 84
End: 2019-11-25

## 2019-11-25 LAB
ALBUMIN UR-MCNC: NEGATIVE MG/DL
APPEARANCE UR: CLEAR
BACTERIA #/AREA URNS HPF: ABNORMAL HPF
BILIRUB UR QL STRIP: NEGATIVE
COLOR UR AUTO: ABNORMAL
GLUCOSE UR STRIP-MCNC: NEGATIVE MG/DL
HGB UR QL STRIP: NEGATIVE
KETONES UR STRIP-MCNC: NEGATIVE MG/DL
LEUKOCYTE ESTERASE UR QL STRIP: ABNORMAL
MUCOUS THREADS #/AREA URNS LPF: ABNORMAL LPF
NITRATE UR QL: POSITIVE
PH UR STRIP: 5.5 [PH] (ref 4.5–8)
RBC #/AREA URNS AUTO: ABNORMAL HPF
SP GR UR STRIP: 1.01 (ref 1–1.03)
SQUAMOUS #/AREA URNS AUTO: ABNORMAL LPF
UROBILINOGEN UR STRIP-ACNC: ABNORMAL
WBC #/AREA URNS AUTO: ABNORMAL HPF
WBC CLUMPS #/AREA URNS HPF: PRESENT /[HPF]

## 2019-11-27 LAB — BACTERIA SPEC CULT: ABNORMAL

## 2020-02-26 ENCOUNTER — RECORDS - HEALTHEAST (OUTPATIENT)
Dept: LAB | Facility: CLINIC | Age: 85
End: 2020-02-26

## 2020-02-27 LAB
ANION GAP SERPL CALCULATED.3IONS-SCNC: 7 MMOL/L (ref 5–18)
BUN SERPL-MCNC: 22 MG/DL (ref 8–28)
CALCIUM SERPL-MCNC: 9.4 MG/DL (ref 8.5–10.5)
CHLORIDE BLD-SCNC: 104 MMOL/L (ref 98–107)
CO2 SERPL-SCNC: 26 MMOL/L (ref 22–31)
CREAT SERPL-MCNC: 0.86 MG/DL (ref 0.6–1.1)
GFR SERPL CREATININE-BSD FRML MDRD: >60 ML/MIN/1.73M2
GLUCOSE BLD-MCNC: 95 MG/DL (ref 70–125)
POTASSIUM BLD-SCNC: 4.4 MMOL/L (ref 3.5–5)
SODIUM SERPL-SCNC: 137 MMOL/L (ref 136–145)

## 2020-04-01 ENCOUNTER — RECORDS - HEALTHEAST (OUTPATIENT)
Dept: LAB | Facility: CLINIC | Age: 85
End: 2020-04-01

## 2020-04-02 LAB
ANION GAP SERPL CALCULATED.3IONS-SCNC: 11 MMOL/L (ref 5–18)
BUN SERPL-MCNC: 24 MG/DL (ref 8–28)
CALCIUM SERPL-MCNC: 9.7 MG/DL (ref 8.5–10.5)
CHLORIDE BLD-SCNC: 104 MMOL/L (ref 98–107)
CO2 SERPL-SCNC: 23 MMOL/L (ref 22–31)
CREAT SERPL-MCNC: 1.01 MG/DL (ref 0.6–1.1)
GFR SERPL CREATININE-BSD FRML MDRD: 51 ML/MIN/1.73M2
GLUCOSE BLD-MCNC: 102 MG/DL (ref 70–125)
POTASSIUM BLD-SCNC: 4.4 MMOL/L (ref 3.5–5)
SODIUM SERPL-SCNC: 138 MMOL/L (ref 136–145)

## 2020-07-14 NOTE — TELEPHONE ENCOUNTER
"Requested Prescriptions   Pending Prescriptions Disp Refills     bumetanide (BUMEX) 1 MG tablet [Pharmacy Med Name: BUMETANIDE 1 MG TABLET]  Last Written Prescription Date:  1/30/18  Last Fill Quantity: 15,  # refills: 0   Last office visit: 11/29/2016 with prescribing provider:  11/29/2016     Future Office Visit:     15 tablet 0     Sig: TAKE 1/2 TABLET BY MOUTH DAILY    Diuretics (Including Combos) Protocol Failed    3/13/2018  9:36 AM       Failed - Blood pressure under 140/90 in past 12 months    BP Readings from Last 3 Encounters:   11/16/17 164/68   02/25/17 118/72   11/29/16 138/68                Failed - Recent (12 mo) or future (30 days) visit within the authorizing provider's specialty    Patient had office visit in the last 12 months or has a visit in the next 30 days with authorizing provider or within the authorizing provider's specialty.  See \"Patient Info\" tab in inbasket, or \"Choose Columns\" in Meds & Orders section of the refill encounter.           Failed - Normal serum sodium on file in past 12 months    Recent Labs   Lab Test  11/16/17   0808   NA  132*             Passed - Patient is age 18 or older       Passed - No active pregancy on record       Passed - Normal serum creatinine on file in past 12 months    Recent Labs   Lab Test  11/16/17   0808   CR  0.88             Passed - Normal serum potassium on file in past 12 months    Recent Labs   Lab Test  11/16/17   0808   POTASSIUM  3.8                   Passed - No positive pregnancy test in past 12 months          " Otc Regimen: Aquaphor Initiate Treatment: Alesia Bingham Continue Regimen: Jr Finnegan Detail Level: Zone

## 2020-12-30 ENCOUNTER — RECORDS - HEALTHEAST (OUTPATIENT)
Dept: LAB | Facility: CLINIC | Age: 85
End: 2020-12-30

## 2020-12-31 LAB
ANION GAP SERPL CALCULATED.3IONS-SCNC: 5 MMOL/L (ref 5–18)
BUN SERPL-MCNC: 20 MG/DL (ref 8–28)
CALCIUM SERPL-MCNC: 8.9 MG/DL (ref 8.5–10.5)
CHLORIDE BLD-SCNC: 103 MMOL/L (ref 98–107)
CO2 SERPL-SCNC: 24 MMOL/L (ref 22–31)
CREAT SERPL-MCNC: 0.93 MG/DL (ref 0.6–1.1)
ERYTHROCYTE [DISTWIDTH] IN BLOOD BY AUTOMATED COUNT: 13.8 % (ref 11–14.5)
GFR SERPL CREATININE-BSD FRML MDRD: 56 ML/MIN/1.73M2
GLUCOSE BLD-MCNC: 94 MG/DL (ref 70–125)
HBA1C MFR BLD: 5.3 %
HCT VFR BLD AUTO: 32.4 % (ref 35–47)
HGB BLD-MCNC: 10.5 G/DL (ref 12–16)
MCH RBC QN AUTO: 30.8 PG (ref 27–34)
MCHC RBC AUTO-ENTMCNC: 32.4 G/DL (ref 32–36)
MCV RBC AUTO: 95 FL (ref 80–100)
PLATELET # BLD AUTO: 186 THOU/UL (ref 140–440)
PMV BLD AUTO: 10 FL (ref 8.5–12.5)
POTASSIUM BLD-SCNC: 4.8 MMOL/L (ref 3.5–5)
RBC # BLD AUTO: 3.41 MILL/UL (ref 3.8–5.4)
SODIUM SERPL-SCNC: 132 MMOL/L (ref 136–145)
WBC: 4.3 THOU/UL (ref 4–11)

## 2021-03-02 ENCOUNTER — RECORDS - HEALTHEAST (OUTPATIENT)
Dept: LAB | Facility: CLINIC | Age: 86
End: 2021-03-02

## 2021-03-03 LAB — HBA1C MFR BLD: 5.2 %

## 2021-04-21 ENCOUNTER — RECORDS - HEALTHEAST (OUTPATIENT)
Dept: LAB | Facility: CLINIC | Age: 86
End: 2021-04-21

## 2021-04-22 LAB
ANION GAP SERPL CALCULATED.3IONS-SCNC: 11 MMOL/L (ref 5–18)
BUN SERPL-MCNC: 19 MG/DL (ref 8–28)
CALCIUM SERPL-MCNC: 9.7 MG/DL (ref 8.5–10.5)
CHLORIDE BLD-SCNC: 102 MMOL/L (ref 98–107)
CO2 SERPL-SCNC: 20 MMOL/L (ref 22–31)
CREAT SERPL-MCNC: 0.92 MG/DL (ref 0.6–1.1)
ERYTHROCYTE [DISTWIDTH] IN BLOOD BY AUTOMATED COUNT: 13.5 % (ref 11–14.5)
GFR SERPL CREATININE-BSD FRML MDRD: 57 ML/MIN/1.73M2
GLUCOSE BLD-MCNC: 106 MG/DL (ref 70–125)
HCT VFR BLD AUTO: 39.8 % (ref 35–47)
HGB BLD-MCNC: 12.5 G/DL (ref 12–16)
MCH RBC QN AUTO: 30.2 PG (ref 27–34)
MCHC RBC AUTO-ENTMCNC: 31.4 G/DL (ref 32–36)
MCV RBC AUTO: 96 FL (ref 80–100)
PLATELET # BLD AUTO: 301 THOU/UL (ref 140–440)
PMV BLD AUTO: 9.7 FL (ref 8.5–12.5)
POTASSIUM BLD-SCNC: 4.6 MMOL/L (ref 3.5–5)
RBC # BLD AUTO: 4.14 MILL/UL (ref 3.8–5.4)
SODIUM SERPL-SCNC: 133 MMOL/L (ref 136–145)
WBC: 9.4 THOU/UL (ref 4–11)

## 2021-07-08 ENCOUNTER — RECORDS - HEALTHEAST (OUTPATIENT)
Dept: LAB | Facility: CLINIC | Age: 86
End: 2021-07-08

## 2021-07-09 LAB
ANION GAP SERPL CALCULATED.3IONS-SCNC: 11 MMOL/L (ref 5–18)
BUN SERPL-MCNC: 16 MG/DL (ref 8–28)
CALCIUM SERPL-MCNC: 9.6 MG/DL (ref 8.5–10.5)
CHLORIDE BLD-SCNC: 103 MMOL/L (ref 98–107)
CO2 SERPL-SCNC: 23 MMOL/L (ref 22–31)
CREAT SERPL-MCNC: 0.9 MG/DL (ref 0.6–1.1)
ERYTHROCYTE [DISTWIDTH] IN BLOOD BY AUTOMATED COUNT: 13.8 % (ref 11–14.5)
GFR SERPL CREATININE-BSD FRML MDRD: 58 ML/MIN/1.73M2
GLUCOSE BLD-MCNC: 109 MG/DL (ref 70–125)
HCT VFR BLD AUTO: 35.9 % (ref 35–47)
HGB BLD-MCNC: 11.5 G/DL (ref 12–16)
MCH RBC QN AUTO: 30.7 PG (ref 27–34)
MCHC RBC AUTO-ENTMCNC: 32 G/DL (ref 32–36)
MCV RBC AUTO: 96 FL (ref 80–100)
PLATELET # BLD AUTO: 241 THOU/UL (ref 140–440)
PMV BLD AUTO: 9.6 FL (ref 8.5–12.5)
POTASSIUM BLD-SCNC: 4 MMOL/L (ref 3.5–5)
RBC # BLD AUTO: 3.74 MILL/UL (ref 3.8–5.4)
SODIUM SERPL-SCNC: 137 MMOL/L (ref 136–145)
WBC: 6.9 THOU/UL (ref 4–11)

## 2021-07-21 ENCOUNTER — LAB REQUISITION (OUTPATIENT)
Dept: LAB | Facility: CLINIC | Age: 86
End: 2021-07-21
Payer: COMMERCIAL

## 2021-07-21 DIAGNOSIS — R60.0 LOCALIZED EDEMA: ICD-10-CM

## 2021-07-22 LAB
ANION GAP SERPL CALCULATED.3IONS-SCNC: 9 MMOL/L (ref 5–18)
BUN SERPL-MCNC: 19 MG/DL (ref 8–28)
CALCIUM SERPL-MCNC: 10 MG/DL (ref 8.5–10.5)
CHLORIDE BLD-SCNC: 100 MMOL/L (ref 98–107)
CO2 SERPL-SCNC: 26 MMOL/L (ref 22–31)
CREAT SERPL-MCNC: 0.94 MG/DL (ref 0.6–1.1)
GFR SERPL CREATININE-BSD FRML MDRD: 51 ML/MIN/1.73M2
GLUCOSE BLD-MCNC: 89 MG/DL (ref 70–125)
POTASSIUM BLD-SCNC: 4.6 MMOL/L (ref 3.5–5)
SODIUM SERPL-SCNC: 135 MMOL/L (ref 136–145)

## 2021-07-22 PROCEDURE — 36415 COLL VENOUS BLD VENIPUNCTURE: CPT | Mod: ORL | Performed by: GENERAL PRACTICE

## 2021-07-22 PROCEDURE — 80048 BASIC METABOLIC PNL TOTAL CA: CPT | Mod: ORL | Performed by: GENERAL PRACTICE

## 2021-07-22 PROCEDURE — P9604 ONE-WAY ALLOW PRORATED TRIP: HCPCS | Mod: ORL | Performed by: GENERAL PRACTICE

## 2021-09-14 ENCOUNTER — LAB REQUISITION (OUTPATIENT)
Dept: LAB | Facility: CLINIC | Age: 86
End: 2021-09-14
Payer: COMMERCIAL

## 2021-09-14 DIAGNOSIS — M25.552 PAIN IN LEFT HIP: ICD-10-CM

## 2021-09-15 LAB
ANION GAP SERPL CALCULATED.3IONS-SCNC: 10 MMOL/L (ref 5–18)
BASOPHILS # BLD AUTO: 0.1 10E3/UL (ref 0–0.2)
BASOPHILS NFR BLD AUTO: 1 %
BUN SERPL-MCNC: 23 MG/DL (ref 8–28)
CALCIUM SERPL-MCNC: 9.8 MG/DL (ref 8.5–10.5)
CHLORIDE BLD-SCNC: 104 MMOL/L (ref 98–107)
CHOLEST SERPL-MCNC: 146 MG/DL
CO2 SERPL-SCNC: 22 MMOL/L (ref 22–31)
CREAT SERPL-MCNC: 0.98 MG/DL (ref 0.6–1.1)
EOSINOPHIL # BLD AUTO: 0.3 10E3/UL (ref 0–0.7)
EOSINOPHIL NFR BLD AUTO: 4 %
ERYTHROCYTE [DISTWIDTH] IN BLOOD BY AUTOMATED COUNT: 13.5 % (ref 10–15)
FASTING STATUS PATIENT QL REPORTED: ABNORMAL
GFR SERPL CREATININE-BSD FRML MDRD: 49 ML/MIN/1.73M2
GLUCOSE BLD-MCNC: 86 MG/DL (ref 70–125)
HCT VFR BLD AUTO: 36.4 % (ref 35–47)
HDLC SERPL-MCNC: 39 MG/DL
HGB BLD-MCNC: 11.7 G/DL (ref 11.7–15.7)
IMM GRANULOCYTES # BLD: 0 10E3/UL
IMM GRANULOCYTES NFR BLD: 0 %
LDLC SERPL CALC-MCNC: 75 MG/DL
LYMPHOCYTES # BLD AUTO: 1.7 10E3/UL (ref 0.8–5.3)
LYMPHOCYTES NFR BLD AUTO: 22 %
MCH RBC QN AUTO: 31.3 PG (ref 26.5–33)
MCHC RBC AUTO-ENTMCNC: 32.1 G/DL (ref 31.5–36.5)
MCV RBC AUTO: 97 FL (ref 78–100)
MONOCYTES # BLD AUTO: 0.7 10E3/UL (ref 0–1.3)
MONOCYTES NFR BLD AUTO: 9 %
NEUTROPHILS # BLD AUTO: 5 10E3/UL (ref 1.6–8.3)
NEUTROPHILS NFR BLD AUTO: 64 %
NRBC # BLD AUTO: 0 10E3/UL
NRBC BLD AUTO-RTO: 0 /100
PLATELET # BLD AUTO: 266 10E3/UL (ref 150–450)
POTASSIUM BLD-SCNC: 4.5 MMOL/L (ref 3.5–5)
RBC # BLD AUTO: 3.74 10E6/UL (ref 3.8–5.2)
SODIUM SERPL-SCNC: 136 MMOL/L (ref 136–145)
TRIGL SERPL-MCNC: 158 MG/DL
WBC # BLD AUTO: 7.8 10E3/UL (ref 4–11)

## 2021-09-15 PROCEDURE — 36415 COLL VENOUS BLD VENIPUNCTURE: CPT | Mod: ORL | Performed by: GENERAL PRACTICE

## 2021-09-15 PROCEDURE — 80048 BASIC METABOLIC PNL TOTAL CA: CPT | Mod: ORL | Performed by: GENERAL PRACTICE

## 2021-09-15 PROCEDURE — 80061 LIPID PANEL: CPT | Mod: ORL | Performed by: GENERAL PRACTICE

## 2021-09-15 PROCEDURE — P9603 ONE-WAY ALLOW PRORATED MILES: HCPCS | Mod: ORL | Performed by: GENERAL PRACTICE

## 2021-09-15 PROCEDURE — 85025 COMPLETE CBC W/AUTO DIFF WBC: CPT | Mod: ORL | Performed by: GENERAL PRACTICE

## 2021-10-04 ENCOUNTER — HOSPITAL ENCOUNTER (EMERGENCY)
Facility: CLINIC | Age: 86
Discharge: HOME | End: 2021-10-04
Attending: EMERGENCY MEDICINE | Admitting: EMERGENCY MEDICINE
Payer: COMMERCIAL

## 2021-10-04 ENCOUNTER — APPOINTMENT (OUTPATIENT)
Dept: CT IMAGING | Facility: CLINIC | Age: 86
End: 2021-10-04
Attending: EMERGENCY MEDICINE
Payer: COMMERCIAL

## 2021-10-04 ENCOUNTER — MEDICAL CORRESPONDENCE (OUTPATIENT)
Dept: HEALTH INFORMATION MANAGEMENT | Facility: CLINIC | Age: 86
End: 2021-10-04

## 2021-10-04 VITALS
OXYGEN SATURATION: 95 % | RESPIRATION RATE: 16 BRPM | TEMPERATURE: 98.4 F | DIASTOLIC BLOOD PRESSURE: 72 MMHG | SYSTOLIC BLOOD PRESSURE: 164 MMHG | HEART RATE: 91 BPM

## 2021-10-04 DIAGNOSIS — S00.01XA ABRASION OF SCALP, INITIAL ENCOUNTER: ICD-10-CM
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
## 2021-10-04 DIAGNOSIS — S09.90XA CLOSED HEAD INJURY, INITIAL ENCOUNTER: ICD-10-CM

## 2021-10-04 PROCEDURE — 70450 CT HEAD/BRAIN W/O DYE: CPT

## 2021-10-04 PROCEDURE — 72125 CT NECK SPINE W/O DYE: CPT

## 2021-10-04 PROCEDURE — 99284 EMERGENCY DEPT VISIT MOD MDM: CPT | Mod: 25

## 2021-10-04 ASSESSMENT — ENCOUNTER SYMPTOMS
NECK PAIN: 1
WOUND: 1
LIGHT-HEADEDNESS: 0
DIZZINESS: 0
ABDOMINAL PAIN: 0
BACK PAIN: 0
ARTHRALGIAS: 0
NUMBNESS: 0

## 2021-10-04 NOTE — ED NOTES
Bed: ED06  Expected date: 10/4/21  Expected time: 11:47 AM  Means of arrival: Ambulance  Comments:  IRENE [phone]- [phone] F,

## 2021-10-04 NOTE — ED PROVIDER NOTES
History   Chief Complaint:  Fall    The history is provided by the patient.      Delmis Quarles is a 89 year old female with history of pulmonary embolism and hypertension who presents with fall. The patient states that she was transferring from her wheelchair to a chair when she slipped and fell. The patient reports hitting her head when she fell and sustaining a laceration to the back of her head. The patient reports some new neck pain after the fall but denies any headache. The patient denies any numbness, pain to her extremities, back or abdomen. The patient denies any lightheadedness or dizziness prior to her fall.    Review of Systems   Gastrointestinal: Negative for abdominal pain.   Musculoskeletal: Positive for neck pain. Negative for arthralgias and back pain.   Skin: Positive for wound.   Neurological: Negative for dizziness, light-headedness and numbness.   All other systems reviewed and are negative.      Allergies:  Trospium  Codeine  Penicillins  Sulfa Drugs    Medications:  Baby aspirin   Lasix   Gabapentin   Lisinopril   Prilosec     Past Medical History:     Gastric ulcer   Pulmonary embolism   Hypertension   OA       Past Surgical History:    Toes amputate   Arthroplasty knee X2   appendectomy  ROCK and BSO   Lumbar fusion   Tonsillectomy and adenoidectomy   Hammer toe repair      Family History:    CAD- brother, father   Leukemia- brother   Sjogren- daughter     Social History:  Lives in assisted living     Physical Exam     Patient Vitals for the past 24 hrs:   BP Temp Temp src Pulse Resp SpO2   10/04/21 1205 (!) 164/72 98.4  F (36.9  C) Temporal 91 16 95 %       Physical Exam  General: Elderly female, sitting upright  Eyes: PERRL, Conjunctive within normal limits.  EOMI.  HENT: No scalp tenderness or palpable scalp hematoma or depression.  Moist mucous membranes, oropharynx clear.   Neck: Maintained in rigid cervical collar with C-spine precautions.  Tender upper cervical spine without  palpable step-off or crepitus.  CV: Normal S1S2.  Regular rate and rhythm  Resp: Normal respiratory effort.  GI: Abdomen is soft, nontender and nondistended. No palpable masses. No rebound or guarding.  MSK: Edema bilateral lower extremities with pression stockings in place.  Nontender.  Limited range of motion of the bilateral lower extremities (chronic per patient), but no pain elicited with active or passive range of motion.    Skin: Warm and dry.  Superficial well approximated less than 1 cm near abrasion over the right posterior scalp.  No other rashes or lesions or ecchymoses on visible skin.  Neuro: Alert and oriented. Responds appropriately to all questions and commands. No focal findings appreciated. Normal muscle tone.  Psych: Normal mood and affect. Pleasant.    Emergency Department Course     Imaging:  Cervical spine CT w/o contrast   Final Result   IMPRESSION:    1. No evidence of acute fracture or subluxation in the cervical spine.   2. Nodular area of hyperattenuation involving the left lateral   tracheal wall which could represent wall adherent aspirated   secretions, however, tracheal lesion cannot be excluded. Follow-up   outpatient neck CT would be typically recommended.      KOURTNEY JONES MD            SYSTEM ID:  [email or web address]      CT Head w/o Contrast   Final Result   IMPRESSION:       1. No acute intracranial abnormality.   2. Chronic changes as detailed.   3. Right occipital scalp contusion/laceration.      KOURTNEY JONES MD            SYSTEM ID:  [email or web address]        Report per radiology    Procedures    Emergency Department Course:  Reviewed:  I reviewed nursing notes, vitals, past medical history, Care Everywhere and [facility]    Assessments:  1210 I obtained history and examined the patient as noted above.     1347 I rechecked the patient and explained findings. I clinically cleared the patient's C-collar.  She is normally ambulatory at baseline, using a wheelchair primarily.  Note she is feeling  at baseline.  She is ready for discharge home.    Disposition:  The patient was discharged to home.     Impression & Plan     Medical Decision Making:  Delmis Quarles is a 89 year old female who suffered a minor head trauma after mechanical fall.  Given age. the patient underwent head and cervical spine CT. CT reveals no skull fracture or intracerebral hemorrhage. The patient is at their normal baseline neurologic state.  No evidence of cervical spine fracture and on removal of the cervical collar she noted her neck is feeling much better with normal range of motion without significant discomfort.  She has no acute neurologic abnormalities on examination or by history.  Thus the patient will be discharged home although the patient was made aware of the possibility of delayed intracerebral hemorrhage and the need to return to the ER immediately for worsening headache, vision change, confusion, numbness, weakness, vomiting or any other concerns.  She is recommended follow-up as needed with her primary care provider within this week.  Return to the emergency department worsening symptoms.  All questions were answered prior to discharge.    Diagnosis:    ICD-10-CM    1. Closed head injury, initial encounter  S09.90XA    2. Fall, initial encounter  W19.XXXA        Scribe Disclosure:  I, Randi Prieto, am serving as a scribe at 12:09 PM on 10/4/2021 to document services personally performed by Holly Tanner MD based on my observations and the provider's statements to me.              Holly Tanner MD  10/05/21 [id number]

## 2021-10-04 NOTE — ED TRIAGE NOTES
Pt here after a mechanical fall from standing at her assisted living. EMS reports a lac to the back of patients head. C-collar in place on arrival. No LOC. A+Ox4, no acute signs of distress. Reports daily ASA 81mg

## 2021-10-04 NOTE — DISCHARGE INSTRUCTIONS
The small wound on the back of your head will heal without intervention. Clean once daily with soap and water and leave it open to air.    Discharge Instructions  Head Injury    You have been seen today for a head injury. Your evaluation included a history and physical examination. You may have had a CT (CAT) scan performed, though most head injuries do not require a scan. Based on this evaluation, your provider today does not feel that your head injury is serious.    Generally, every Emergency Department visit should have a follow-up clinic visit with either a primary or a specialty clinic/provider. Please follow-up as instructed by your emergency provider today.  Return to the Emergency Department if:    You are confused or you are not acting right.    Your headache gets worse or you start to have a really bad headache even with your recommended treatment plan.    You vomit (throw up) more than once.    You have a seizure.    You have trouble walking.    You have weakness or paralysis (cannot move) in an arm or a leg.    You have blood or fluid coming from your ears or nose.    You have new symptoms or anything that worries you.    Sleeping:  It is okay for you to sleep, but someone should wake you up if instructed by your provider, and someone should check on you at your usual time to wake up.     Activity:    Do not drive for at least 24 hours.    Do not drive if you have dizzy spells or trouble concentrating, or remembering things.    Do not return to any contact sports until cleared by your regular provider.     MORE INFORMATION:    Concussion:  A concussion is a minor head injury that may cause temporary problems with the way the brain works. Although concussions are important, they are generally not an emergency or a reason that a person needs to be hospitalized. Some concussion symptoms include confusion, amnesia (forgetful), nausea (sick to your stomach) and vomiting (throwing up), dizziness, fatigue,  memory or concentration problems, irritability and sleep problems. For most people, concussions are mild and temporary but some will have more severe and persistent symptoms that require on-going care and treatment.  CT Scans: Your evaluation today may have included a CT scan (CAT scan) to look for things like bleeding or a skull fracture (broken bone).  CT scans involve radiation and too many CT scans can cause serious health problems like cancer, especially in children.  Because of this, your provider may not have ordered a CT scan today if they think you are at low risk for a serious or life threatening problem.    If you were given a prescription for medicine here today, be sure to read all of the information (including the package insert) that comes with your prescription.  This will include important information about the medicine, its side effects, and any warnings that you need to know about.  The pharmacist who fills the prescription can provide more information and answer questions you may have about the medicine.  If you have questions or concerns that the pharmacist cannot address, please call or return to the Emergency Department.     Remember that you can always come back to the Emergency Department if you are not able to see your regular provider in the amount of time listed above, if you get any new symptoms, or if there is anything that worries you.

## 2021-10-20 ENCOUNTER — HOSPITAL ENCOUNTER (OUTPATIENT)
Dept: CT IMAGING | Facility: CLINIC | Age: 86
Discharge: HOME | End: 2021-10-20
Attending: GENERAL PRACTICE | Admitting: GENERAL PRACTICE
Payer: COMMERCIAL

## 2021-10-20 DIAGNOSIS — Z91.81 HISTORY OF FALL: ICD-10-CM

## 2021-10-20 LAB
CREAT BLD-MCNC: 1.1 MG/DL (ref 0.5–1)
GFR SERPL CREATININE-BSD FRML MDRD: 42 ML/MIN/1.73M2

## 2021-10-20 PROCEDURE — 70491 CT SOFT TISSUE NECK W/DYE: CPT

## 2021-10-20 PROCEDURE — 250N000011 HC RX IP 250 OP 636

## 2021-10-20 PROCEDURE — 250N000009 HC RX 250

## 2021-10-20 PROCEDURE — 82565 ASSAY OF CREATININE: CPT

## 2021-10-20 RX ORDER — IOPAMIDOL 755 MG/ML
500 INJECTION, SOLUTION INTRAVASCULAR ONCE
Status: COMPLETED | OUTPATIENT
Start: 2021-10-20 | End: 2021-10-20

## 2021-10-20 RX ADMIN — SODIUM CHLORIDE 65 ML: 9 INJECTION, SOLUTION INTRAVENOUS at 10:05

## 2021-10-20 RX ADMIN — IOPAMIDOL 80 ML: 755 INJECTION, SOLUTION INTRAVENOUS at 10:05

## 2021-12-14 ENCOUNTER — LAB REQUISITION (OUTPATIENT)
Dept: LAB | Facility: CLINIC | Age: 86
End: 2021-12-14
Payer: COMMERCIAL

## 2021-12-14 DIAGNOSIS — R22.42 LOCALIZED SWELLING, MASS AND LUMP, LEFT LOWER LIMB: ICD-10-CM

## 2021-12-15 LAB
ANION GAP SERPL CALCULATED.3IONS-SCNC: 10 MMOL/L (ref 5–18)
BUN SERPL-MCNC: 36 MG/DL (ref 8–28)
CALCIUM SERPL-MCNC: 9.7 MG/DL (ref 8.5–10.5)
CHLORIDE BLD-SCNC: 107 MMOL/L (ref 98–107)
CO2 SERPL-SCNC: 24 MMOL/L (ref 22–31)
CREAT SERPL-MCNC: 1.44 MG/DL (ref 0.6–1.1)
GFR SERPL CREATININE-BSD FRML MDRD: 30 ML/MIN/1.73M2
GLUCOSE BLD-MCNC: 89 MG/DL (ref 70–125)
POTASSIUM BLD-SCNC: 4.1 MMOL/L (ref 3.5–5)
SODIUM SERPL-SCNC: 141 MMOL/L (ref 136–145)

## 2021-12-15 PROCEDURE — 36415 COLL VENOUS BLD VENIPUNCTURE: CPT | Mod: ORL | Performed by: NURSE PRACTITIONER

## 2021-12-15 PROCEDURE — 80048 BASIC METABOLIC PNL TOTAL CA: CPT | Mod: ORL | Performed by: NURSE PRACTITIONER

## 2021-12-15 PROCEDURE — P9604 ONE-WAY ALLOW PRORATED TRIP: HCPCS | Mod: ORL | Performed by: NURSE PRACTITIONER

## 2021-12-30 ENCOUNTER — LAB REQUISITION (OUTPATIENT)
Dept: LAB | Facility: CLINIC | Age: 86
End: 2021-12-30
Payer: COMMERCIAL

## 2021-12-30 DIAGNOSIS — I10 ESSENTIAL (PRIMARY) HYPERTENSION: ICD-10-CM

## 2022-01-03 LAB
ANION GAP SERPL CALCULATED.3IONS-SCNC: 11 MMOL/L (ref 5–18)
BUN SERPL-MCNC: 24 MG/DL (ref 8–28)
CALCIUM SERPL-MCNC: 10.2 MG/DL (ref 8.5–10.5)
CHLORIDE BLD-SCNC: 111 MMOL/L (ref 98–107)
CO2 SERPL-SCNC: 19 MMOL/L (ref 22–31)
CREAT SERPL-MCNC: 0.91 MG/DL (ref 0.6–1.1)
GFR SERPL CREATININE-BSD FRML MDRD: 57 ML/MIN/1.73M2
GLUCOSE BLD-MCNC: 107 MG/DL (ref 70–125)
POTASSIUM BLD-SCNC: 4.5 MMOL/L (ref 3.5–5)
SODIUM SERPL-SCNC: 141 MMOL/L (ref 136–145)

## 2022-01-03 PROCEDURE — 36415 COLL VENOUS BLD VENIPUNCTURE: CPT | Mod: ORL | Performed by: GENERAL PRACTICE

## 2022-01-03 PROCEDURE — 80048 BASIC METABOLIC PNL TOTAL CA: CPT | Mod: ORL | Performed by: GENERAL PRACTICE

## 2022-01-03 PROCEDURE — P9604 ONE-WAY ALLOW PRORATED TRIP: HCPCS | Mod: ORL | Performed by: GENERAL PRACTICE

## 2022-01-13 ENCOUNTER — TELEPHONE (OUTPATIENT)
Dept: WOUND CARE | Facility: CLINIC | Age: 87
End: 2022-01-13
Payer: COMMERCIAL

## 2022-01-13 DIAGNOSIS — L97.512 ULCER OF GREAT TOE, RIGHT, WITH FAT LAYER EXPOSED (H): Primary | ICD-10-CM

## 2022-01-13 NOTE — TELEPHONE ENCOUNTER
Marianela from [facility] called to set up an appointment for Delmis, she is an old wound care patient of Dr. Elise's back in [phone].   She will fax info.

## 2022-01-13 NOTE — TELEPHONE ENCOUNTER
Spoke with Marianela,  at [facility] ([phone]).    Consult received via phone (fax coming) from provider Svitlana Toro for ulcer of the Right great toe.    History neuropathy and non-palpable pedal pulses.  Per Standing order Patient qualifies for BAILEY to be scheduled prior to assessment with Dr. Elise, Winnie David or Truong at [facility] [place] [facility].    Routing to  Kady Gonzalez.

## 2022-01-17 NOTE — TELEPHONE ENCOUNTER
Spoke with care nurse. Their scheduling is going to call us back to get Aramarjit scheduled for her appointments.       Kady Gonzalez    [facility]   [phone]

## 2022-01-20 ENCOUNTER — TELEPHONE (OUTPATIENT)
Dept: WOUND CARE | Facility: CLINIC | Age: 87
End: 2022-01-20
Payer: COMMERCIAL

## 2022-01-20 NOTE — TELEPHONE ENCOUNTER
Marianela from Care Facility called and patient's daughter is unable to attend appointment of US at [facility] visit with Daria OTTO.  Daughter can attend appointment between now and 1/27, however there is no available appointments with the Provider.  Please advise if patient needs to be cancelled and rescheduled.  Routing to [facility] .

## 2022-01-26 ENCOUNTER — HOSPITAL ENCOUNTER (OUTPATIENT)
Dept: ULTRASOUND IMAGING | Facility: CLINIC | Age: 87
Discharge: HOME | End: 2022-01-26
Attending: PODIATRIST | Admitting: PODIATRIST
Payer: COMMERCIAL

## 2022-01-26 DIAGNOSIS — L97.512 ULCER OF GREAT TOE, RIGHT, WITH FAT LAYER EXPOSED (H): ICD-10-CM

## 2022-01-26 PROCEDURE — 93922 UPR/L XTREMITY ART 2 LEVELS: CPT

## 2022-01-27 ENCOUNTER — TELEPHONE (OUTPATIENT)
Dept: WOUND CARE | Facility: CLINIC | Age: 87
End: 2022-01-27
Payer: COMMERCIAL

## 2022-01-27 NOTE — TELEPHONE ENCOUNTER
----- Message from Yelitza Elise DPM, Podiatry/Foot and Ankle Surgery sent at 1/27/2022  8:47 AM CST -----  Bloodflow test show good flow.  PLease let patient know.

## 2022-02-02 ENCOUNTER — TELEPHONE (OUTPATIENT)
Dept: WOUND CARE | Facility: CLINIC | Age: 87
End: 2022-02-02

## 2022-02-02 ENCOUNTER — HOSPITAL ENCOUNTER (OUTPATIENT)
Dept: WOUND CARE | Facility: CLINIC | Age: 87
End: 2022-02-02
Attending: PODIATRIST
Payer: COMMERCIAL

## 2022-02-02 ENCOUNTER — HOSPITAL ENCOUNTER (OUTPATIENT)
Dept: GENERAL RADIOLOGY | Facility: CLINIC | Age: 87
End: 2022-02-02
Attending: PHYSICIAN ASSISTANT
Payer: COMMERCIAL

## 2022-02-02 VITALS
HEART RATE: 83 BPM | BODY MASS INDEX: 25.61 KG/M2 | DIASTOLIC BLOOD PRESSURE: 72 MMHG | SYSTOLIC BLOOD PRESSURE: 151 MMHG | HEIGHT: 62 IN | TEMPERATURE: 97.3 F

## 2022-02-02 DIAGNOSIS — L97.512 ULCER OF GREAT TOE, RIGHT, WITH FAT LAYER EXPOSED (H): Primary | ICD-10-CM

## 2022-02-02 DIAGNOSIS — L97.513 SKIN ULCER OF TOE OF RIGHT FOOT WITH NECROSIS OF MUSCLE (H): ICD-10-CM

## 2022-02-02 DIAGNOSIS — L97.513 SKIN ULCER OF TOE OF RIGHT FOOT WITH NECROSIS OF MUSCLE (H): Primary | ICD-10-CM

## 2022-02-02 PROCEDURE — 73660 X-RAY EXAM OF TOE(S): CPT | Mod: RT

## 2022-02-02 PROCEDURE — G0463 HOSPITAL OUTPT CLINIC VISIT: HCPCS | Mod: 25

## 2022-02-02 PROCEDURE — 99204 OFFICE O/P NEW MOD 45 MIN: CPT | Performed by: PHYSICIAN ASSISTANT

## 2022-02-02 PROCEDURE — 97602 WOUND(S) CARE NON-SELECTIVE: CPT

## 2022-02-02 RX ORDER — LISINOPRIL 30 MG/1
TABLET ORAL
COMMUNITY
Start: 2022-01-25 | End: 2024-01-01

## 2022-02-02 RX ORDER — AMLODIPINE BESYLATE 2.5 MG/1
2.5 TABLET ORAL 2 TIMES DAILY
Status: ON HOLD | COMMUNITY
Start: 2022-01-08 | End: 2024-01-01

## 2022-02-02 RX ORDER — OMEPRAZOLE 10 MG/1
10 CAPSULE, DELAYED RELEASE ORAL DAILY
Status: ON HOLD | COMMUNITY
Start: 2022-01-03 | End: 2024-01-01

## 2022-02-02 RX ORDER — DOXYCYCLINE 100 MG/1
100 CAPSULE ORAL 2 TIMES DAILY
Qty: 20 CAPSULE | Refills: 0 | Status: SHIPPED | OUTPATIENT
Start: 2022-02-02 | End: 2022-02-12

## 2022-02-02 RX ORDER — POTASSIUM CHLORIDE 750 MG/1
TABLET, EXTENDED RELEASE ORAL
COMMUNITY
Start: 2022-01-03 | End: 2024-01-01

## 2022-02-02 NOTE — TELEPHONE ENCOUNTER
Patient had xray. Preliminary negative. Shaista Hassan wants to order MRI, please schedule. Yes there is concern for infection that is why we started antibiotics. She reports understanding.

## 2022-02-02 NOTE — PROGRESS NOTES
"[facility]    ASSESSMENT:   1. 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 cellullitis of right toe    PLAN/DISCUSSION:   1. Concerned about likely septic joint/osteo as wound probing down to joint capsule. Will get x-ray of toe and have her follow-up with Dr. Elise for her next visit.  2. Toe is likely rubbing on stiff leather shoe, recommended she go to [facility] and see if they can stretch the shoe in this area.   3. Doxy 100mg BID x 10d  4. Wound care plan: Iodosorb, gauze, change daily  5. Stop KRISTOPHER stockings, these are inappropriate for an ambulatory patient, furthermore she is not wearing and sock over her toes and this leaves her toes unprotected. Needs knee high stocking with toes, 15-20mmHg likely sufficient. Keep feet warm.   6. Nutrition: vit D 5kIU/day, high protein diet  7. See bottom of note for detailed wound care and patient instructions    HISTORY OF PRESENT ILLNESS:   Delmis Quarles is a relatively health 89 year old female with peripheral neuropathy and history of non-healing toe wounds who presents today for an ulceration over right great toe. This has been present for several months. She just underwent BAILEY screening on 1/26 which was essentially normal. However, clinically she has cool feet and decreased pedal pulses. She notes that she has pain in her R leg for which she has no known cause. She resides in SNF unit in [facility]. She wears a stiff leather sneaker from [employer].     VITALS: BP (!) 151/72 (BP Location: Right arm)   Pulse 83   Temp 97.3  F (36.3  C) (Temporal)   Ht 1.575 m (5' 2\")   BMI 25.61 kg/m       PHYSICAL EXAM:  GENERAL: Patient is alert and oriented and in no acute distress  CV: decreased pedal pulses  INTEGUMENTARY: wound probes down to joint capsule  Wound (used by OP [facility] only) 02/02/22 1005 Right (Active)   Dressing Appearance moist drainage 02/02/22 1000   Length (cm) 0.8 02/02/22 1000   Width (cm) 0.8 02/02/22 1000   Depth " (cm) 0.3 02/02/22 1000   Wound (cm^2) 0.64 cm^2 02/02/22 1000   Wound Volume (cm^3) 0.19 cm^3 02/02/22 1000   Drainage Characteristics/Odor serosanguineous 02/02/22 1000   Drainage Amount moderate 02/02/22 1000       MDM: 45-59 minutes were spent on the date of the visit reviewing previous chart notes, evaluating patient and developing the treatment plan, this excludes any time spent on procedures.     PATIENT INSTRUCTIONS      Further instructions from your care team       Delmis Quarles      9/5/1924    Right Great Toe  Cleanse with wound cleanser or saline and gauze  Apply skin prep to skin surrounding the wound (but not in the wound)  Cover wound with Iodosorb gel on gauze  Cover wound with one gauze and one roll gauze secure with tape  Change dressing daily  Keep Toe Protected (keep socks on over toes)       Electronically signed by Shaista Hassan PA-C on February 2, 2022

## 2022-02-02 NOTE — DISCHARGE INSTRUCTIONS
Delmis Quarles      9/5/1924    Right Great Toe  Cleanse with wound cleanser or saline and gauze  Apply skin prep to skin surrounding the wound (but not in the wound)  Cover wound with Iodosorb gel on gauze  Cover wound with one gauze and one roll gauze secure with tape  Change dressing daily  Keep Toe Protected (keep socks on over toes)            Shaista Hassan PA-C. February 2, 2022    Call us at [phone] if you have any questions about your wounds, have redness or swelling around your wound, have a fever of 101 or greater or if you have any other problems or concerns. We answer the phone Monday through Friday 8 am to 4 pm, please leave a message as we check the voicemail frequently throughout the day.     If you had a positive experience please indicate on your patient satisfaction survey form that [facility] will be sending you.    If you have any billing related questions please call the [facility] Business office at [phone]. The clinic staff does not handle billing related matters.

## 2022-02-02 NOTE — TELEPHONE ENCOUNTER
[facility]    Who is the name of the provider?:  Sonya      What is the location you see this provider at?: [place]    Reason for call:   Office visit today, 2/2, after visit summary talks about x-rays.  Did she have the x-rays today or are they supposed to do them at that facility?  Is there an infection?    Can we leave a detailed message on this number?  YES

## 2022-02-04 ENCOUNTER — TELEPHONE (OUTPATIENT)
Dept: WOUND CARE | Facility: CLINIC | Age: 87
End: 2022-02-04
Payer: COMMERCIAL

## 2022-02-04 NOTE — TELEPHONE ENCOUNTER
[facility]    Who is the name of the provider?:  Sonya      What is the location you see this provider at?: [place]    Reason for call:  Concern re timing of MRI and office visit on 2/11. Concern there may not be enough time for transportation to get her from [place] to [place] on time for the office visit.     Can we leave a detailed message on this number?  YES

## 2022-02-04 NOTE — TELEPHONE ENCOUNTER
Called back [facility].  They will reschedule their appointment with Dr. Elise at [facility] instead (phone # given).

## 2022-02-11 ENCOUNTER — HOSPITAL ENCOUNTER (OUTPATIENT)
Dept: MRI IMAGING | Facility: CLINIC | Age: 87
Discharge: HOME | End: 2022-02-11
Attending: PHYSICIAN ASSISTANT | Admitting: PHYSICIAN ASSISTANT
Payer: COMMERCIAL

## 2022-02-11 DIAGNOSIS — L97.512 ULCER OF GREAT TOE, RIGHT, WITH FAT LAYER EXPOSED (H): ICD-10-CM

## 2022-02-11 PROCEDURE — 73720 MRI LWR EXTREMITY W/O&W/DYE: CPT | Mod: RT

## 2022-02-11 PROCEDURE — A9585 GADOBUTROL INJECTION: HCPCS | Performed by: PHYSICIAN ASSISTANT

## 2022-02-11 PROCEDURE — 73720 MRI LWR EXTREMITY W/O&W/DYE: CPT | Mod: 26 | Performed by: RADIOLOGY

## 2022-02-11 PROCEDURE — 255N000002 HC RX 255 OP 636: Performed by: PHYSICIAN ASSISTANT

## 2022-02-11 RX ORDER — GADOBUTROL 604.72 MG/ML
6.5 INJECTION INTRAVENOUS ONCE
Status: COMPLETED | OUTPATIENT
Start: 2022-02-11 | End: 2022-02-11

## 2022-02-11 RX ADMIN — GADOBUTROL 6.5 ML: 604.72 INJECTION INTRAVENOUS at 09:10

## 2022-02-15 ENCOUNTER — OFFICE VISIT (OUTPATIENT)
Dept: PODIATRY | Facility: CLINIC | Age: 87
End: 2022-02-15
Payer: COMMERCIAL

## 2022-02-15 VITALS — SYSTOLIC BLOOD PRESSURE: 150 MMHG | BODY MASS INDEX: 25.61 KG/M2 | DIASTOLIC BLOOD PRESSURE: 76 MMHG | HEIGHT: 62 IN

## 2022-02-15 DIAGNOSIS — G60.9 IDIOPATHIC PERIPHERAL NEUROPATHY: ICD-10-CM

## 2022-02-15 DIAGNOSIS — M20.31 HALLUX HAMMERTOE, RIGHT: ICD-10-CM

## 2022-02-15 DIAGNOSIS — L97.512 CHRONIC ULCER OF RIGHT GREAT TOE WITH FAT LAYER EXPOSED (H): Primary | ICD-10-CM

## 2022-02-15 DIAGNOSIS — Z89.421 H/O AMPUTATION OF LESSER TOE, RIGHT (H): ICD-10-CM

## 2022-02-15 PROCEDURE — 11042 DBRDMT SUBQ TIS 1ST 20SQCM/<: CPT | Performed by: PODIATRIST

## 2022-02-15 PROCEDURE — 99203 OFFICE O/P NEW LOW 30 MIN: CPT | Mod: 25 | Performed by: PODIATRIST

## 2022-02-15 NOTE — PROGRESS NOTES
PATIENT HISTORY:  Shaista Hassan PA-C requested I see this patient for their foot issue.  Delmis Quarles is a 89 year old female who presents to clinic for right foot ulcer and likely osteomyelitis.  Here with her granddaughter.  Patient had an MRI which showed bone infection and is here to follow-up on those results.  Patient does not have any pain to the foot but has baseline neuropathy.  She is currently in an assisted care facility where the nurses do her dressing changes.  Denies fever, nausea, vomiting.    Review of Systems:  Patient denies fever, chills, rash,stiffness, limping,  weakness, heart burn, blood in stool, chest pain with activity, calf pain when walking, shortness of breath with activity, chronic cough, easy bleeding/bruising, swelling of ankles, excessive thirst, fatigue, depression, anxiety.  Patient admits to numbness, wound.     PAST MEDICAL HISTORY:   Past Medical History:   Diagnosis Date     Arthritis      Blood transfusion      Chronic gastric ulcer without mention of hemorrhage, perforation, without mention of obstruction 5/5/2006    EGD, NSAID induced; PPI indefinitely,      Diffuse cystic mastopathy      Embolism and thrombosis of unspecified site 1992    Post phlebitic syndrome; Jobst stocking     Essential hypertension, benign 1972     Neuropathy     FEET AND HANDS     Osteoporosis, unspecified 1/00    Dexa done in [place], [place]; Fosamax started 1/00     Other lymphedema      Stricture and stenosis of esophagus 2000    dilitation         PAST SURGICAL HISTORY:   Past Surgical History:   Procedure Laterality Date     AMPUTATE TOE(S) BILATERAL  4/23/2014    Procedure: AMPUTATE TOE(S) BILATERAL;  Surgeon: Yelitza Elise, DPM, Pod;  Location: [facility] OR     ARTHROPLASTY KNEE  2008    left     ARTHROPLASTY KNEE  1/3/2012    Procedure:ARTHROPLASTY KNEE; Right Total Knee Arthroplasty,       FUSION THORACIC LUMBAR ANTERIOR TWO LEVELS  5/10/2014    Procedure: FUSION THORACIC LUMBAR ANTERIOR TWO  LEVELS;  Surgeon: Tomás Worthy MD;  Location: [facility]      FLEX SIGMOIDOSCOPY W/WO NATALIE SPEC BY BRUSH/WASH  9/00    colon screen- Flex by Dr. Rosario      REMOVE TONSILS/ADENOIDS,12+ Y/O  ~1938     LIGATN/STRIP LONG & SHORT SAPHEN  2005    RFA of right vein     OPTICAL TRACKING SYSTEM FUSION POSTERIOR SPINE LUMBAR  5/10/2014    Procedure: OPTICAL TRACKING SYSTEM FUSION SPINE POSTERIOR LUMBAR ONE LEVEL;  Surgeon: Tomás Worthy MD;  Location: [facility]     REPAIR HAMMER TOE  4/23/2014    Procedure: REPAIR HAMMER TOE;  Surgeon: Yelitza Elise, DPM, Pod;  Location: [facility]     ZZC APPENDECTOMY  1954     ZZC TOTAL ABDOM HYSTERECTOMY  1972    ROCK/BSO        MEDICATIONS:   Current Outpatient Medications:      acetaminophen (TYLENOL) 325 MG tablet, Take 2 tablets (650 mg) by mouth every 4 hours as needed for mild pain, Disp: 30 tablet, Rfl: 0     amLODIPine (NORVASC) 5 MG tablet, , Disp: , Rfl:      aspirin 81 MG EC tablet, Take 1 tablet (81 mg) by mouth daily (Patient taking differently: Take 81 mg by mouth every evening ), Disp: 90 tablet, Rfl: 3     benzonatate (TESSALON) 100 MG capsule, Take 1 capsule (100 mg) by mouth 3 times daily as needed for cough, Disp: 42 capsule, Rfl:      Calcium Carb-Cholecalciferol (CALCIUM CARBONATE-VITAMIN D3) 600-400 MG-UNIT TABS, , Disp: , Rfl:      Calcium Carbonate-Vitamin D (CALCIUM + D) 600-200 MG-UNIT per tablet, Take 1 tablet by mouth 2 times daily , Disp: , Rfl:      diclofenac (VOLTAREN) 1 % topical gel, , Disp: , Rfl:      estradiol (VAGIFEM) 10 MCG TABS vaginal tablet, INSERT 1 TABLET VAGINALLY TWICE WEEKLY (Patient taking differently: INSERT 1 TABLET VAGINALLY TWICE WEEKLY Tues/Fri), Disp: 24 tablet, Rfl: 0     fexofenadine (ALLEGRA) 180 MG tablet, Take 180 mg by mouth daily as needed for allergies, Disp: , Rfl:      fluticasone (FLONASE) 50 MCG/ACT spray, INSTILL 2 SPRAYS INTO BOTH NOSTRILS ONCE DAILY, Disp: 16 mL, Rfl: 0     furosemide (LASIX) 40 MG tablet, Take  1 tablet (40 mg) by mouth daily, Disp: 180 tablet, Rfl: 1     gabapentin (NEURONTIN) 100 MG capsule, 200 mg in the morning and 100 mg at noon  200 mg in the evening, Disp: 450 capsule, Rfl: 1     guaiFENesin (MUCINEX) 600 MG 12 hr tablet, Take 1 tablet (600 mg) by mouth 2 times daily as needed for congestion, Disp: 28 tablet, Rfl:      guaiFENesin-dextromethorphan (ROBITUSSIN DM) 100-10 MG/5ML syrup, Take 5 mLs by mouth every 4 hours as needed for cough, Disp: 560 mL, Rfl: 0     latanoprost (XALATAN) 0.005 % ophthalmic solution, Place 1 drop into both eyes At Bedtime , Disp: , Rfl:      lisinopril (PRINIVIL/ZESTRIL) 20 MG tablet, Take 1 tablet (20 mg) by mouth 2 times daily, Disp: 30 tablet, Rfl: 0     lisinopril (ZESTRIL) 30 MG tablet, , Disp: , Rfl:      multivitamin, therapeutic with minerals (MULTI-VITAMIN) TABS tablet, Take 1 tablet by mouth daily, Disp: , Rfl:      omeprazole (PRILOSEC) 10 MG DR capsule, , Disp: , Rfl:      omeprazole (PRILOSEC) 20 MG CR capsule, TAKE 1 CAPSULE EVERY MORNING DAILY, Disp: 90 capsule, Rfl: 0     potassium chloride ER (K-TAB/KLOR-CON) 10 MEQ CR tablet, , Disp: , Rfl:      timolol (TIMOPTIC) 0.5 % ophthalmic solution, Place 1 drop into both eyes daily, Disp: , Rfl:      vitamin B-Complex, Take 1 tablet by mouth daily, Disp: 90 tablet, Rfl: 3     Vitamin D, Cholecalciferol, 1000 units TABS, Take 2,000 Units by mouth daily, Disp: , Rfl:      ALLERGIES:    Allergies   Allergen Reactions     Trospium Swelling     Lower extremity edema     Codeine      dry mouth and nausea     Penicillins      dry mouth     Sulfa Drugs      dry mouth        SOCIAL HISTORY:   Social History     Socioeconomic History     Marital status:      Spouse name: Not on file     Number of children: 4     Years of education: Not on file     Highest education level: Not on file   Occupational History     Not on file   Tobacco Use     Smoking status: Never Smoker     Smokeless tobacco: Never Used   Substance  "and Sexual Activity     Alcohol use: No     Alcohol/week: 0.0 standard drinks     Drug use: No     Sexual activity: Never   Other Topics Concern      Service Not Asked     Blood Transfusions No     Caffeine Concern No     Comment: only when out to eat     Occupational Exposure Not Asked     Hobby Hazards Not Asked     Sleep Concern Not Asked     Stress Concern Not Asked     Weight Concern Not Asked     Special Diet Yes     Comment: 1500 mg Calcium + dietary calcium     Back Care Not Asked     Exercise Yes     Comment: 3 times a week- walk     Bike Helmet Not Asked     Seat Belt Yes     Self-Exams Yes     Comment: fibrocystic     Parent/sibling w/ CABG, MI or angioplasty before 65F 55M? Yes   Social History Narrative     Not on file     Social Determinants of Health     Financial Resource Strain: Not on file   Food Insecurity: Not on file   Transportation Needs: Not on file   Physical Activity: Not on file   Stress: Not on file   Social Connections: Not on file   Intimate Partner Violence: Not on file   Housing Stability: Not on file        FAMILY HISTORY:   Family History   Problem Relation Age of Onset     C.A.D. Brother          at 67     C.A.D. Father         CHF     C.A.D. Maternal Uncle      C.A.D. Maternal Uncle      Blood Disease Brother         type of Leukemia      () Daughter         Sjogren's        EXAM:Vitals: BP (!) 150/76   Ht 1.575 m (5' 2\")   BMI 25.61 kg/m    BMI= Body mass index is 25.61 kg/m .    General appearance: Patient is alert and fully cooperative with history & exam.  No sign of distress is noted during the visit.     Psychiatric: Affect is pleasant & appropriate.  Patient appears motivated to improve health.     Respiratory: Breathing is regular & unlabored while sitting.     HEENT: Hearing is intact to spoken word.  Speech is clear.  No gross evidence of visual impairment that would impact ambulation.     Dermatologic: Full-thickness ulceration to the dorsal aspect of the " right great toe at the interphalangeal joint.  No redness, purulent drainage or signs of acute infection noted.  Measurements after debridement are 0.4 x 0.2 x 0.2 cm.  Base of the wound is somewhat fibrous.     Vascular: DP & PT pulses are intact & regular bilaterally.  No significant edema or varicosities noted.  CFT and skin temperature is normal to both lower extremities.     Neurologic: Lower extremity sensation is absent to feet.     Musculoskeletal: Patient is in wheelchair.  Rigid contracture of right great toe.  Partial toe amputations right second toe.    Radiographs:  Right foot xray -  I personally reviewed the xrays - No definite lytic or destructive changes in the great toe.       Previous amputation of the second toe at the PIP joint. Pin in place  fusing the third toe. Fifth toe distal phalanx poorly seen due to  patient positioning.    MRI right foot:  1. First metatarsal head abnormality. Although ulcer is not  definitively seen on MR, given reported clinical history, this is  highly concerning for osteomyelitis. Since it is at the joint surface,  associated septic arthritis is in differential if osteomyelitis is  confirmed. Other imaging differential consideration in the proper  clinical setting would include entity such as insufficiency fracture.  2. Similar signal abnormality of second metatarsal head. Differential  consideration includes additional area of osteomyelitis/septic  arthritis and insufficiency fracture.  3. Prominent soft tissue coursing of the forth toe fixation pin.     ASSESSMENT:    Chronic ulcer of right great toe with fat layer exposed (H)  Hallux hammertoe, right  H/O amputation of lesser toe, right (H)  Idiopathic peripheral neuropathy       Medical Decision Making/Plan:  Reviewed patient's chart in [facility].  Reviewed and discussed MRI results with patient and patient's daughter.  Where it is showing bone infection there is no ulcer in that area and there is no signs of bone  infection in the area where the ulcer is.  Therefore surgery is not indicated at this time.  I recommend that she continue to wear a postop shoe to keep pressure off of the right great toe.  Recommend continuing daily dressing changes with Iodosorb gel and a bandage.  Recommend follow-up in 6 weeks at the [facility] wound healing [place].  All questions were answered to patient and patient's family member satisfaction and they will call with further questions or concerns.    Procedure: After verbal consent, excisional debridement was performed on ulcer.  #15 blade was used to debride ulcer down to and including subcutaneous tissue. Bleeding controlled with light pressure.   No drainage noted.  No anesthesia was used due to neuropathy. Dry dressing applied to foot.  Patient tolerated procedure well.      Patient risk factor: Patient is at high risk for infection.        Yelitza Elise DPM, Podiatry/Foot and Ankle Surgery

## 2022-02-15 NOTE — Clinical Note
Kj Noonan,    Patient's right toe is actually looking really good.  She does not have any signs of bone infection where the ulcer is and where the MRI is saying that the bone infection is is back further in the foot so I do not think she needs in a surgery at this time.  We will have her follow back up at the wound center in 6 weeks.

## 2022-02-15 NOTE — LETTER
2/15/2022         RE: Delmis Quarles  [facility]  [address] [address] [facility] [address]        Dear Colleague,    Thank you for referring your patient, Delmis Quarles, to the [facility]. Please see a copy of my visit note below.    PATIENT HISTORY:  Shaista Hassan PA-C requested I see this patient for their foot issue.  Delmis Quarles is a 89 year old female who presents to clinic for right foot ulcer and likely osteomyelitis.  Here with her granddaughter.  Patient had an MRI which showed bone infection and is here to follow-up on those results.  Patient does not have any pain to the foot but has baseline neuropathy.  She is currently in an assisted care facility where the nurses do her dressing changes.  Denies fever, nausea, vomiting.    Review of Systems:  Patient denies fever, chills, rash,stiffness, limping,  weakness, heart burn, blood in stool, chest pain with activity, calf pain when walking, shortness of breath with activity, chronic cough, easy bleeding/bruising, swelling of ankles, excessive thirst, fatigue, depression, anxiety.  Patient admits to numbness, wound.     PAST MEDICAL HISTORY:   Past Medical History:   Diagnosis Date     Arthritis      Blood transfusion      Chronic gastric ulcer without mention of hemorrhage, perforation, without mention of obstruction 5/5/2006    EGD, NSAID induced; PPI indefinitely,      Diffuse cystic mastopathy      Embolism and thrombosis of unspecified site 1992    Post phlebitic syndrome; Jobst stocking     Essential hypertension, benign 1972     Neuropathy     FEET AND HANDS     Osteoporosis, unspecified 1/00    Dexa done in [place], [place]; Fosamax started 1/00     Other lymphedema      Stricture and stenosis of esophagus 2000    dilitation         PAST SURGICAL HISTORY:   Past Surgical History:   Procedure Laterality Date     AMPUTATE TOE(S) BILATERAL  4/23/2014    Procedure: AMPUTATE TOE(S) BILATERAL;   Surgeon: Yelitza Elise DPROSLYN, Pod;  Location: [facility] OR     ARTHROPLASTY KNEE  2008    left     ARTHROPLASTY KNEE  1/3/2012    Procedure:ARTHROPLASTY KNEE; Right Total Knee Arthroplasty,       FUSION THORACIC LUMBAR ANTERIOR TWO LEVELS  5/10/2014    Procedure: FUSION THORACIC LUMBAR ANTERIOR TWO LEVELS;  Surgeon: Tomás Worthy MD;  Location: [facility] OR      FLEX SIGMOIDOSCOPY W/WO NATALIE SPEC BY BRUSH/WASH  9/00    colon screen- Flex by Dr. Rosario     HC REMOVE TONSILS/ADENOIDS,12+ Y/O  ~1938     LIGATN/STRIP LONG & SHORT SAPHEN  2005    RFA of right vein     OPTICAL TRACKING SYSTEM FUSION POSTERIOR SPINE LUMBAR  5/10/2014    Procedure: OPTICAL TRACKING SYSTEM FUSION SPINE POSTERIOR LUMBAR ONE LEVEL;  Surgeon: Tomás Worthy MD;  Location: [facility] OR     REPAIR HAMMER TOE  4/23/2014    Procedure: REPAIR HAMMER TOE;  Surgeon: Yelitza Elise, DPM, Pod;  Location: [facility] OR     ZZC APPENDECTOMY  1954     ZZC TOTAL ABDOM HYSTERECTOMY  1972    ROCK/BSO        MEDICATIONS:   Current Outpatient Medications:      acetaminophen (TYLENOL) 325 MG tablet, Take 2 tablets (650 mg) by mouth every 4 hours as needed for mild pain, Disp: 30 tablet, Rfl: 0     amLODIPine (NORVASC) 5 MG tablet, , Disp: , Rfl:      aspirin 81 MG EC tablet, Take 1 tablet (81 mg) by mouth daily (Patient taking differently: Take 81 mg by mouth every evening ), Disp: 90 tablet, Rfl: 3     benzonatate (TESSALON) 100 MG capsule, Take 1 capsule (100 mg) by mouth 3 times daily as needed for cough, Disp: 42 capsule, Rfl:      Calcium Carb-Cholecalciferol (CALCIUM CARBONATE-VITAMIN D3) 600-400 MG-UNIT TABS, , Disp: , Rfl:      Calcium Carbonate-Vitamin D (CALCIUM + D) 600-200 MG-UNIT per tablet, Take 1 tablet by mouth 2 times daily , Disp: , Rfl:      diclofenac (VOLTAREN) 1 % topical gel, , Disp: , Rfl:      estradiol (VAGIFEM) 10 MCG TABS vaginal tablet, INSERT 1 TABLET VAGINALLY TWICE WEEKLY (Patient taking differently: INSERT 1 TABLET VAGINALLY TWICE WEEKLY  Tues/Fri), Disp: 24 tablet, Rfl: 0     fexofenadine (ALLEGRA) 180 MG tablet, Take 180 mg by mouth daily as needed for allergies, Disp: , Rfl:      fluticasone (FLONASE) 50 MCG/ACT spray, INSTILL 2 SPRAYS INTO BOTH NOSTRILS ONCE DAILY, Disp: 16 mL, Rfl: 0     furosemide (LASIX) 40 MG tablet, Take 1 tablet (40 mg) by mouth daily, Disp: 180 tablet, Rfl: 1     gabapentin (NEURONTIN) 100 MG capsule, 200 mg in the morning and 100 mg at noon  200 mg in the evening, Disp: 450 capsule, Rfl: 1     guaiFENesin (MUCINEX) 600 MG 12 hr tablet, Take 1 tablet (600 mg) by mouth 2 times daily as needed for congestion, Disp: 28 tablet, Rfl:      guaiFENesin-dextromethorphan (ROBITUSSIN DM) 100-10 MG/5ML syrup, Take 5 mLs by mouth every 4 hours as needed for cough, Disp: 560 mL, Rfl: 0     latanoprost (XALATAN) 0.005 % ophthalmic solution, Place 1 drop into both eyes At Bedtime , Disp: , Rfl:      lisinopril (PRINIVIL/ZESTRIL) 20 MG tablet, Take 1 tablet (20 mg) by mouth 2 times daily, Disp: 30 tablet, Rfl: 0     lisinopril (ZESTRIL) 30 MG tablet, , Disp: , Rfl:      multivitamin, therapeutic with minerals (MULTI-VITAMIN) TABS tablet, Take 1 tablet by mouth daily, Disp: , Rfl:      omeprazole (PRILOSEC) 10 MG DR capsule, , Disp: , Rfl:      omeprazole (PRILOSEC) 20 MG CR capsule, TAKE 1 CAPSULE EVERY MORNING DAILY, Disp: 90 capsule, Rfl: 0     potassium chloride ER (K-TAB/KLOR-CON) 10 MEQ CR tablet, , Disp: , Rfl:      timolol (TIMOPTIC) 0.5 % ophthalmic solution, Place 1 drop into both eyes daily, Disp: , Rfl:      vitamin B-Complex, Take 1 tablet by mouth daily, Disp: 90 tablet, Rfl: 3     Vitamin D, Cholecalciferol, 1000 units TABS, Take 2,000 Units by mouth daily, Disp: , Rfl:      ALLERGIES:    Allergies   Allergen Reactions     Trospium Swelling     Lower extremity edema     Codeine      dry mouth and nausea     Penicillins      dry mouth     Sulfa Drugs      dry mouth        SOCIAL HISTORY:   Social History     Socioeconomic  "History     Marital status:      Spouse name: Not on file     Number of children: 4     Years of education: Not on file     Highest education level: Not on file   Occupational History     Not on file   Tobacco Use     Smoking status: Never Smoker     Smokeless tobacco: Never Used   Substance and Sexual Activity     Alcohol use: No     Alcohol/week: 0.0 standard drinks     Drug use: No     Sexual activity: Never   Other Topics Concern      Service Not Asked     Blood Transfusions No     Caffeine Concern No     Comment: only when out to eat     Occupational Exposure Not Asked     Hobby Hazards Not Asked     Sleep Concern Not Asked     Stress Concern Not Asked     Weight Concern Not Asked     Special Diet Yes     Comment: 1500 mg Calcium + dietary calcium     Back Care Not Asked     Exercise Yes     Comment: 3 times a week- walk     Bike Helmet Not Asked     Seat Belt Yes     Self-Exams Yes     Comment: fibrocystic     Parent/sibling w/ CABG, MI or angioplasty before 65F 55M? Yes   Social History Narrative     Not on file     Social Determinants of Health     Financial Resource Strain: Not on file   Food Insecurity: Not on file   Transportation Needs: Not on file   Physical Activity: Not on file   Stress: Not on file   Social Connections: Not on file   Intimate Partner Violence: Not on file   Housing Stability: Not on file        FAMILY HISTORY:   Family History   Problem Relation Age of Onset     C.A.D. Brother          at 67     C.A.D. Father         CHF     C.A.D. Maternal Uncle      C.A.D. Maternal Uncle      Blood Disease Brother         type of Leukemia      () Daughter         Sjogren's        EXAM:Vitals: BP (!) 150/76   Ht 1.575 m (5' 2\")   BMI 25.61 kg/m    BMI= Body mass index is 25.61 kg/m .    General appearance: Patient is alert and fully cooperative with history & exam.  No sign of distress is noted during the visit.     Psychiatric: Affect is pleasant & appropriate.  Patient appears " motivated to improve health.     Respiratory: Breathing is regular & unlabored while sitting.     HEENT: Hearing is intact to spoken word.  Speech is clear.  No gross evidence of visual impairment that would impact ambulation.     Dermatologic: Full-thickness ulceration to the dorsal aspect of the right great toe at the interphalangeal joint.  No redness, purulent drainage or signs of acute infection noted.  Measurements after debridement are 0.4 x 0.2 x 0.2 cm.  Base of the wound is somewhat fibrous.     Vascular: DP & PT pulses are intact & regular bilaterally.  No significant edema or varicosities noted.  CFT and skin temperature is normal to both lower extremities.     Neurologic: Lower extremity sensation is absent to feet.     Musculoskeletal: Patient is in wheelchair.  Rigid contracture of right great toe.  Partial toe amputations right second toe.    Radiographs:  Right foot xray -  I personally reviewed the xrays - No definite lytic or destructive changes in the great toe.       Previous amputation of the second toe at the PIP joint. Pin in place  fusing the third toe. Fifth toe distal phalanx poorly seen due to  patient positioning.    MRI right foot:  1. First metatarsal head abnormality. Although ulcer is not  definitively seen on MR, given reported clinical history, this is  highly concerning for osteomyelitis. Since it is at the joint surface,  associated septic arthritis is in differential if osteomyelitis is  confirmed. Other imaging differential consideration in the proper  clinical setting would include entity such as insufficiency fracture.  2. Similar signal abnormality of second metatarsal head. Differential  consideration includes additional area of osteomyelitis/septic  arthritis and insufficiency fracture.  3. Prominent soft tissue coursing of the forth toe fixation pin.     ASSESSMENT:    Chronic ulcer of right great toe with fat layer exposed (H)  Hallux hammertoe, right  H/O amputation of  lesser toe, right (H)  Idiopathic peripheral neuropathy       Medical Decision Making/Plan:  Reviewed patient's chart in [facility].  Reviewed and discussed MRI results with patient and patient's daughter.  Where it is showing bone infection there is no ulcer in that area and there is no signs of bone infection in the area where the ulcer is.  Therefore surgery is not indicated at this time.  I recommend that she continue to wear a postop shoe to keep pressure off of the right great toe.  Recommend continuing daily dressing changes with Iodosorb gel and a bandage.  Recommend follow-up in 6 weeks at the [facility] wound healing [place].  All questions were answered to patient and patient's family member satisfaction and they will call with further questions or concerns.    Procedure: After verbal consent, excisional debridement was performed on ulcer.  #15 blade was used to debride ulcer down to and including subcutaneous tissue. Bleeding controlled with light pressure.   No drainage noted.  No anesthesia was used due to neuropathy. Dry dressing applied to foot.  Patient tolerated procedure well.      Patient risk factor: Patient is at high risk for infection.        Yelitza Elise DPM, Podiatry/Foot and Ankle Surgery        Again, thank you for allowing me to participate in the care of your patient.        Sincerely,        Yelitza Elise DPM, Podiatry/Foot and Ankle Surgery

## 2022-02-20 ENCOUNTER — LAB REQUISITION (OUTPATIENT)
Dept: LAB | Facility: CLINIC | Age: 87
End: 2022-02-20
Payer: COMMERCIAL

## 2022-02-20 DIAGNOSIS — R44.2 OTHER HALLUCINATIONS: ICD-10-CM

## 2022-02-20 PROCEDURE — 87086 URINE CULTURE/COLONY COUNT: CPT | Mod: ORL | Performed by: PHYSICIAN ASSISTANT

## 2022-02-20 PROCEDURE — 81001 URINALYSIS AUTO W/SCOPE: CPT | Mod: ORL | Performed by: PHYSICIAN ASSISTANT

## 2022-02-21 ENCOUNTER — LAB REQUISITION (OUTPATIENT)
Dept: LAB | Facility: CLINIC | Age: 87
End: 2022-02-21
Payer: COMMERCIAL

## 2022-02-21 DIAGNOSIS — R44.2 OTHER HALLUCINATIONS: ICD-10-CM

## 2022-02-21 LAB
ALBUMIN UR-MCNC: NEGATIVE MG/DL
ANION GAP SERPL CALCULATED.3IONS-SCNC: 10 MMOL/L (ref 5–18)
APPEARANCE UR: CLEAR
BASOPHILS # BLD AUTO: 0.1 10E3/UL (ref 0–0.2)
BASOPHILS NFR BLD AUTO: 1 %
BILIRUB UR QL STRIP: NEGATIVE
BUN SERPL-MCNC: 17 MG/DL (ref 8–28)
CALCIUM SERPL-MCNC: 9.2 MG/DL (ref 8.5–10.5)
CHLORIDE BLD-SCNC: 113 MMOL/L (ref 98–107)
CO2 SERPL-SCNC: 21 MMOL/L (ref 22–31)
COLOR UR AUTO: YELLOW
CREAT SERPL-MCNC: 0.89 MG/DL (ref 0.6–1.1)
EOSINOPHIL # BLD AUTO: 0.4 10E3/UL (ref 0–0.7)
EOSINOPHIL NFR BLD AUTO: 5 %
ERYTHROCYTE [DISTWIDTH] IN BLOOD BY AUTOMATED COUNT: 14.1 % (ref 10–15)
GFR SERPL CREATININE-BSD FRML MDRD: 59 ML/MIN/1.73M2
GLUCOSE BLD-MCNC: 105 MG/DL (ref 70–125)
GLUCOSE UR STRIP-MCNC: NEGATIVE MG/DL
HCT VFR BLD AUTO: 36.1 % (ref 35–47)
HGB BLD-MCNC: 11.1 G/DL (ref 11.7–15.7)
HGB UR QL STRIP: NEGATIVE
IMM GRANULOCYTES # BLD: 0 10E3/UL
IMM GRANULOCYTES NFR BLD: 1 %
KETONES UR STRIP-MCNC: NEGATIVE MG/DL
LEUKOCYTE ESTERASE UR QL STRIP: ABNORMAL
LYMPHOCYTES # BLD AUTO: 2.1 10E3/UL (ref 0.8–5.3)
LYMPHOCYTES NFR BLD AUTO: 26 %
MCH RBC QN AUTO: 30.8 PG (ref 26.5–33)
MCHC RBC AUTO-ENTMCNC: 30.7 G/DL (ref 31.5–36.5)
MCV RBC AUTO: 100 FL (ref 78–100)
MONOCYTES # BLD AUTO: 0.7 10E3/UL (ref 0–1.3)
MONOCYTES NFR BLD AUTO: 9 %
MUCOUS THREADS #/AREA URNS LPF: PRESENT /LPF
NEUTROPHILS # BLD AUTO: 4.7 10E3/UL (ref 1.6–8.3)
NEUTROPHILS NFR BLD AUTO: 58 %
NITRATE UR QL: NEGATIVE
NRBC # BLD AUTO: 0 10E3/UL
NRBC BLD AUTO-RTO: 0 /100
PH UR STRIP: 5 [PH] (ref 5–7)
PLATELET # BLD AUTO: 184 10E3/UL (ref 150–450)
POTASSIUM BLD-SCNC: 4.1 MMOL/L (ref 3.5–5)
RBC # BLD AUTO: 3.6 10E6/UL (ref 3.8–5.2)
RBC URINE: <1 /HPF
SODIUM SERPL-SCNC: 144 MMOL/L (ref 136–145)
SP GR UR STRIP: 1.02 (ref 1–1.03)
SQUAMOUS EPITHELIAL: 3 /HPF
UROBILINOGEN UR STRIP-MCNC: <2 MG/DL
WBC # BLD AUTO: 8 10E3/UL (ref 4–11)
WBC URINE: 1 /HPF

## 2022-02-21 PROCEDURE — 80048 BASIC METABOLIC PNL TOTAL CA: CPT | Mod: ORL | Performed by: PHYSICIAN ASSISTANT

## 2022-02-21 PROCEDURE — 36415 COLL VENOUS BLD VENIPUNCTURE: CPT | Mod: ORL | Performed by: PHYSICIAN ASSISTANT

## 2022-02-21 PROCEDURE — 85025 COMPLETE CBC W/AUTO DIFF WBC: CPT | Mod: ORL | Performed by: PHYSICIAN ASSISTANT

## 2022-02-21 PROCEDURE — P9603 ONE-WAY ALLOW PRORATED MILES: HCPCS | Mod: ORL | Performed by: PHYSICIAN ASSISTANT

## 2022-02-22 LAB — BACTERIA UR CULT: NORMAL

## 2022-03-06 ENCOUNTER — LAB REQUISITION (OUTPATIENT)
Dept: LAB | Facility: CLINIC | Age: 87
End: 2022-03-06
Payer: COMMERCIAL

## 2022-03-06 LAB
ALBUMIN UR-MCNC: NEGATIVE MG/DL
APPEARANCE UR: CLEAR
BILIRUB UR QL STRIP: NEGATIVE
COLOR UR AUTO: YELLOW
GLUCOSE UR STRIP-MCNC: NEGATIVE MG/DL
HGB UR QL STRIP: NEGATIVE
KETONES UR STRIP-MCNC: NEGATIVE MG/DL
LEUKOCYTE ESTERASE UR QL STRIP: NEGATIVE
NITRATE UR QL: NEGATIVE
PH UR STRIP: 5 [PH] (ref 5–7)
RBC URINE: <1 /HPF
SP GR UR STRIP: 1.02 (ref 1–1.03)
UROBILINOGEN UR STRIP-MCNC: <2 MG/DL
WBC URINE: <1 /HPF

## 2022-03-06 PROCEDURE — 81001 URINALYSIS AUTO W/SCOPE: CPT | Mod: ORL | Performed by: GENERAL PRACTICE

## 2022-03-06 PROCEDURE — 87086 URINE CULTURE/COLONY COUNT: CPT | Mod: ORL | Performed by: GENERAL PRACTICE

## 2022-03-07 ENCOUNTER — LAB REQUISITION (OUTPATIENT)
Dept: LAB | Facility: CLINIC | Age: 87
End: 2022-03-07
Payer: COMMERCIAL

## 2022-03-07 ENCOUNTER — TELEPHONE (OUTPATIENT)
Dept: WOUND CARE | Facility: CLINIC | Age: 87
End: 2022-03-07
Payer: COMMERCIAL

## 2022-03-07 DIAGNOSIS — R53.1 WEAKNESS: ICD-10-CM

## 2022-03-07 NOTE — TELEPHONE ENCOUNTER
Received call from Vijay that patient is confused and not her self. They are worried it is because of an infection in her foot. Advised that if she is confused that she should be sent to ER for evaluation. She reports understanding.

## 2022-03-08 LAB
ANION GAP SERPL CALCULATED.3IONS-SCNC: 11 MMOL/L (ref 5–18)
BACTERIA UR CULT: NORMAL
BASOPHILS # BLD AUTO: 0.1 10E3/UL (ref 0–0.2)
BASOPHILS NFR BLD AUTO: 1 %
BUN SERPL-MCNC: 39 MG/DL (ref 8–28)
CALCIUM SERPL-MCNC: 9.4 MG/DL (ref 8.5–10.5)
CHLORIDE BLD-SCNC: 113 MMOL/L (ref 98–107)
CO2 SERPL-SCNC: 18 MMOL/L (ref 22–31)
CREAT SERPL-MCNC: 1.29 MG/DL (ref 0.6–1.1)
EOSINOPHIL # BLD AUTO: 0.4 10E3/UL (ref 0–0.7)
EOSINOPHIL NFR BLD AUTO: 5 %
ERYTHROCYTE [DISTWIDTH] IN BLOOD BY AUTOMATED COUNT: 14 % (ref 10–15)
ERYTHROCYTE [SEDIMENTATION RATE] IN BLOOD BY WESTERGREN METHOD: 19 MM/HR (ref 0–20)
GFR SERPL CREATININE-BSD FRML MDRD: 38 ML/MIN/1.73M2
GLUCOSE BLD-MCNC: 94 MG/DL (ref 70–125)
HCT VFR BLD AUTO: 36.7 % (ref 35–47)
HGB BLD-MCNC: 11.2 G/DL (ref 11.7–15.7)
IMM GRANULOCYTES # BLD: 0.1 10E3/UL
IMM GRANULOCYTES NFR BLD: 1 %
LYMPHOCYTES # BLD AUTO: 2.2 10E3/UL (ref 0.8–5.3)
LYMPHOCYTES NFR BLD AUTO: 27 %
MCH RBC QN AUTO: 31.4 PG (ref 26.5–33)
MCHC RBC AUTO-ENTMCNC: 30.5 G/DL (ref 31.5–36.5)
MCV RBC AUTO: 103 FL (ref 78–100)
MONOCYTES # BLD AUTO: 0.7 10E3/UL (ref 0–1.3)
MONOCYTES NFR BLD AUTO: 9 %
NEUTROPHILS # BLD AUTO: 4.7 10E3/UL (ref 1.6–8.3)
NEUTROPHILS NFR BLD AUTO: 57 %
NRBC # BLD AUTO: 0 10E3/UL
NRBC BLD AUTO-RTO: 0 /100
PLATELET # BLD AUTO: 191 10E3/UL (ref 150–450)
POTASSIUM BLD-SCNC: 4.9 MMOL/L (ref 3.5–5)
RBC # BLD AUTO: 3.57 10E6/UL (ref 3.8–5.2)
SODIUM SERPL-SCNC: 142 MMOL/L (ref 136–145)
WBC # BLD AUTO: 8.1 10E3/UL (ref 4–11)

## 2022-03-08 PROCEDURE — 80048 BASIC METABOLIC PNL TOTAL CA: CPT | Mod: ORL | Performed by: GENERAL PRACTICE

## 2022-03-08 PROCEDURE — P9603 ONE-WAY ALLOW PRORATED MILES: HCPCS | Mod: ORL | Performed by: GENERAL PRACTICE

## 2022-03-08 PROCEDURE — 85025 COMPLETE CBC W/AUTO DIFF WBC: CPT | Mod: ORL | Performed by: GENERAL PRACTICE

## 2022-03-08 PROCEDURE — 36415 COLL VENOUS BLD VENIPUNCTURE: CPT | Mod: ORL | Performed by: GENERAL PRACTICE

## 2022-03-08 PROCEDURE — 85652 RBC SED RATE AUTOMATED: CPT | Mod: ORL | Performed by: GENERAL PRACTICE

## 2022-03-09 ENCOUNTER — LAB REQUISITION (OUTPATIENT)
Dept: LAB | Facility: CLINIC | Age: 87
End: 2022-03-09
Payer: COMMERCIAL

## 2022-03-09 DIAGNOSIS — R53.1 WEAKNESS: ICD-10-CM

## 2022-03-10 LAB
ALP SERPL-CCNC: 51 U/L (ref 45–120)
ALT SERPL W P-5'-P-CCNC: 11 U/L (ref 0–45)
ANION GAP SERPL CALCULATED.3IONS-SCNC: 10 MMOL/L (ref 5–18)
AST SERPL W P-5'-P-CCNC: 15 U/L (ref 0–40)
BILIRUB SERPL-MCNC: 0.3 MG/DL (ref 0–1)
BUN SERPL-MCNC: 36 MG/DL (ref 8–28)
CALCIUM SERPL-MCNC: 9.5 MG/DL (ref 8.5–10.5)
CHLORIDE BLD-SCNC: 115 MMOL/L (ref 98–107)
CO2 SERPL-SCNC: 19 MMOL/L (ref 22–31)
CREAT SERPL-MCNC: 0.99 MG/DL (ref 0.6–1.1)
GFR SERPL CREATININE-BSD FRML MDRD: 52 ML/MIN/1.73M2
GLUCOSE BLD-MCNC: 117 MG/DL (ref 70–125)
POTASSIUM BLD-SCNC: 4.6 MMOL/L (ref 3.5–5)
SODIUM SERPL-SCNC: 144 MMOL/L (ref 136–145)
TSH SERPL DL<=0.005 MIU/L-ACNC: 2.82 UIU/ML (ref 0.3–5)

## 2022-03-10 PROCEDURE — 82247 BILIRUBIN TOTAL: CPT | Mod: ORL | Performed by: GENERAL PRACTICE

## 2022-03-10 PROCEDURE — 84443 ASSAY THYROID STIM HORMONE: CPT | Mod: ORL | Performed by: GENERAL PRACTICE

## 2022-03-10 PROCEDURE — 84075 ASSAY ALKALINE PHOSPHATASE: CPT | Mod: ORL | Performed by: GENERAL PRACTICE

## 2022-03-10 PROCEDURE — P9603 ONE-WAY ALLOW PRORATED MILES: HCPCS | Mod: ORL | Performed by: GENERAL PRACTICE

## 2022-03-10 PROCEDURE — 84450 TRANSFERASE (AST) (SGOT): CPT | Mod: ORL | Performed by: GENERAL PRACTICE

## 2022-03-10 PROCEDURE — 84460 ALANINE AMINO (ALT) (SGPT): CPT | Mod: ORL | Performed by: GENERAL PRACTICE

## 2022-03-10 PROCEDURE — 80048 BASIC METABOLIC PNL TOTAL CA: CPT | Mod: ORL | Performed by: GENERAL PRACTICE

## 2022-03-10 PROCEDURE — 36415 COLL VENOUS BLD VENIPUNCTURE: CPT | Mod: ORL | Performed by: GENERAL PRACTICE

## 2022-03-25 ENCOUNTER — LAB REQUISITION (OUTPATIENT)
Dept: LAB | Facility: CLINIC | Age: 87
End: 2022-03-25
Payer: COMMERCIAL

## 2022-03-25 DIAGNOSIS — R41.82 ALTERED MENTAL STATUS, UNSPECIFIED: ICD-10-CM

## 2022-03-26 LAB
BASOPHILS # BLD AUTO: 0.1 10E3/UL (ref 0–0.2)
BASOPHILS NFR BLD AUTO: 1 %
EOSINOPHIL # BLD AUTO: 0.3 10E3/UL (ref 0–0.7)
EOSINOPHIL NFR BLD AUTO: 3 %
ERYTHROCYTE [DISTWIDTH] IN BLOOD BY AUTOMATED COUNT: 14.3 % (ref 10–15)
HCT VFR BLD AUTO: 36.3 % (ref 35–47)
HGB BLD-MCNC: 11.4 G/DL (ref 11.7–15.7)
IMM GRANULOCYTES # BLD: 0.1 10E3/UL
IMM GRANULOCYTES NFR BLD: 1 %
LYMPHOCYTES # BLD AUTO: 2.1 10E3/UL (ref 0.8–5.3)
LYMPHOCYTES NFR BLD AUTO: 26 %
MCH RBC QN AUTO: 31.9 PG (ref 26.5–33)
MCHC RBC AUTO-ENTMCNC: 31.4 G/DL (ref 31.5–36.5)
MCV RBC AUTO: 102 FL (ref 78–100)
MONOCYTES # BLD AUTO: 0.8 10E3/UL (ref 0–1.3)
MONOCYTES NFR BLD AUTO: 10 %
NEUTROPHILS # BLD AUTO: 5 10E3/UL (ref 1.6–8.3)
NEUTROPHILS NFR BLD AUTO: 59 %
NRBC # BLD AUTO: 0 10E3/UL
NRBC BLD AUTO-RTO: 0 /100
PLATELET # BLD AUTO: 177 10E3/UL (ref 150–450)
RBC # BLD AUTO: 3.57 10E6/UL (ref 3.8–5.2)
WBC # BLD AUTO: 8.3 10E3/UL (ref 4–11)

## 2022-03-26 PROCEDURE — P9603 ONE-WAY ALLOW PRORATED MILES: HCPCS | Mod: ORL | Performed by: GENERAL PRACTICE

## 2022-03-26 PROCEDURE — 85025 COMPLETE CBC W/AUTO DIFF WBC: CPT | Mod: ORL | Performed by: GENERAL PRACTICE

## 2022-03-26 PROCEDURE — 36415 COLL VENOUS BLD VENIPUNCTURE: CPT | Mod: ORL | Performed by: GENERAL PRACTICE

## 2022-03-29 ENCOUNTER — OFFICE VISIT (OUTPATIENT)
Dept: PODIATRY | Facility: CLINIC | Age: 87
End: 2022-03-29
Payer: COMMERCIAL

## 2022-03-29 VITALS — BODY MASS INDEX: 25.61 KG/M2 | HEIGHT: 62 IN | SYSTOLIC BLOOD PRESSURE: 140 MMHG | DIASTOLIC BLOOD PRESSURE: 68 MMHG

## 2022-03-29 DIAGNOSIS — L97.512 CHRONIC ULCER OF RIGHT GREAT TOE WITH FAT LAYER EXPOSED (H): Primary | ICD-10-CM

## 2022-03-29 DIAGNOSIS — M20.31 HALLUX HAMMERTOE, RIGHT: ICD-10-CM

## 2022-03-29 DIAGNOSIS — G60.9 IDIOPATHIC PERIPHERAL NEUROPATHY: ICD-10-CM

## 2022-03-29 DIAGNOSIS — Z89.421 H/O AMPUTATION OF LESSER TOE, RIGHT (H): ICD-10-CM

## 2022-03-29 PROCEDURE — 11042 DBRDMT SUBQ TIS 1ST 20SQCM/<: CPT | Performed by: PODIATRIST

## 2022-03-29 PROCEDURE — 99212 OFFICE O/P EST SF 10 MIN: CPT | Mod: 25 | Performed by: PODIATRIST

## 2022-03-29 NOTE — PROGRESS NOTES
"Podiatry / Foot and Ankle Surgery Progress Note    March 29, 2022    Subject: Patient was seen for chronic right great toe ulcer.  They have been doing Iodosorb gel dressing changes to the area. Here with daughter. No concerns today.     Objective:  Vitals: BP (!) 140/68   Ht 1.575 m (5' 2\")   BMI 25.61 kg/m      General:  Patient is alert and orientated.  NAD.    Dermatologic: Full-thickness ulceration to the dorsal aspect of the right great toe at the interphalangeal joint.  No redness, purulent drainage or signs of acute infection noted.  Measurements after debridement are 0.4 x 0.2 x 0.2 cm.  Base of the wound is somewhat fibrous.     Vascular: DP & PT pulses are intact & regular bilaterally.  No significant edema or varicosities noted.  CFT and skin temperature is normal to both lower extremities.     Neurologic: Lower extremity sensation is absent to feet.     Musculoskeletal: Patient is in wheelchair.  Rigid contracture of right great toe.  Partial toe amputations right second toe.     Radiographs:  Right foot xray -  I personally reviewed the xrays - No definite lytic or destructive changes in the great toe.       Previous amputation of the second toe at the PIP joint. Pin in place  fusing the third toe. Fifth toe distal phalanx poorly seen due to  patient positioning.     MRI right foot:  1. First metatarsal head abnormality. Although ulcer is not  definitively seen on MR, given reported clinical history, this is  highly concerning for osteomyelitis. Since it is at the joint surface,  associated septic arthritis is in differential if osteomyelitis is  confirmed. Other imaging differential consideration in the proper  clinical setting would include entity such as insufficiency fracture.  2. Similar signal abnormality of second metatarsal head. Differential  consideration includes additional area of osteomyelitis/septic  arthritis and insufficiency fracture.  3. Prominent soft tissue coursing of the forth " toe fixation pin.     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    At this time the wound was debrided.  Please see procedure note below.   Recommend continuing daily dressing changes with Iodosorb gel and a bandage.  Recommend follow-up in 8 weeks..  All questions were answered to patient and patient's family member satisfaction and they will call with further questions or concerns.     Procedure: After verbal consent, excisional debridement was performed on ulcer.  #15 blade was used to debride ulcer down to and including subcutaneous tissue. Bleeding controlled with light pressure.   No drainage noted.  No anesthesia was used due to neuropathy. Dry dressing applied to foot.  Patient tolerated procedure well.        Patient risk factor: Patient is at high risk for infection.     Yelitza Elise DPM, Podiatry/Foot and Ankle Surgery

## 2022-03-29 NOTE — LETTER
"    3/29/2022         RE: Delmis Quarles  [facility]  [address] [facility] [address]        Dear Colleague,    Thank you for referring your patient, Delmis Quarles, to the [facility]. Please see a copy of my visit note below.    Podiatry / Foot and Ankle Surgery Progress Note    March 29, 2022    Subject: Patient was seen for chronic right great toe ulcer.  They have been doing Iodosorb gel dressing changes to the area. Here with daughter. No concerns today.     Objective:  Vitals: BP (!) 140/68   Ht 1.575 m (5' 2\")   BMI 25.61 kg/m      General:  Patient is alert and orientated.  NAD.    Dermatologic: Full-thickness ulceration to the dorsal aspect of the right great toe at the interphalangeal joint.  No redness, purulent drainage or signs of acute infection noted.  Measurements after debridement are 0.4 x 0.2 x 0.2 cm.  Base of the wound is somewhat fibrous.     Vascular: DP & PT pulses are intact & regular bilaterally.  No significant edema or varicosities noted.  CFT and skin temperature is normal to both lower extremities.     Neurologic: Lower extremity sensation is absent to feet.     Musculoskeletal: Patient is in wheelchair.  Rigid contracture of right great toe.  Partial toe amputations right second toe.     Radiographs:  Right foot xray -  I personally reviewed the xrays - No definite lytic or destructive changes in the great toe.       Previous amputation of the second toe at the PIP joint. Pin in place  fusing the third toe. Fifth toe distal phalanx poorly seen due to  patient positioning.     MRI right foot:  1. First metatarsal head abnormality. Although ulcer is not  definitively seen on MR, given reported clinical history, this is  highly concerning for osteomyelitis. Since it is at the joint surface,  associated septic arthritis is in differential if osteomyelitis is  confirmed. Other imaging differential consideration in the " proper  clinical setting would include entity such as insufficiency fracture.  2. Similar signal abnormality of second metatarsal head. Differential  consideration includes additional area of osteomyelitis/septic  arthritis and insufficiency fracture.  3. Prominent soft tissue coursing of the forth toe fixation pin.     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    At this time the wound was debrided.  Please see procedure note below.   Recommend continuing daily dressing changes with Iodosorb gel and a bandage.  Recommend follow-up in 8 weeks..  All questions were answered to patient and patient's family member satisfaction and they will call with further questions or concerns.     Procedure: After verbal consent, excisional debridement was performed on ulcer.  #15 blade was used to debride ulcer down to and including subcutaneous tissue. Bleeding controlled with light pressure.   No drainage noted.  No anesthesia was used due to neuropathy. Dry dressing applied to foot.  Patient tolerated procedure well.        Patient risk factor: Patient is at high risk for infection.     Yelitza Elise DPM, Podiatry/Foot and Ankle Surgery            Again, thank you for allowing me to participate in the care of your patient.        Sincerely,        Yelitza Elise DPM, Podiatry/Foot and Ankle Surgery

## 2022-03-29 NOTE — PATIENT INSTRUCTIONS
Thank you for choosing [facility] Podiatry / Foot & Ankle Surgery!    DR JORDAN'S CLINIC:  [facility]   [address]   [place] [address]      TRIAGE LINE: [phone] - Opt. 4  APPOINTMENTS: [phone]  RADIOLOGY: [phone]  SET UP SURGERY: [phone]  BILLING QUESTIONS: [phone]  FAX: [phone]       Follow up: 8 weeks, if wound heals may cancel.   Use iodisorb still.

## 2022-04-15 ENCOUNTER — LAB REQUISITION (OUTPATIENT)
Dept: LAB | Facility: CLINIC | Age: 87
End: 2022-04-15
Payer: COMMERCIAL

## 2022-04-15 DIAGNOSIS — M25.822 OTHER SPECIFIED JOINT DISORDERS, LEFT ELBOW: ICD-10-CM

## 2022-04-18 LAB
ALBUMIN SERPL-MCNC: 2.7 G/DL (ref 3.5–5)
ALP SERPL-CCNC: 70 U/L (ref 45–120)
ALT SERPL W P-5'-P-CCNC: 10 U/L (ref 0–45)
ANION GAP SERPL CALCULATED.3IONS-SCNC: 13 MMOL/L (ref 5–18)
AST SERPL W P-5'-P-CCNC: 19 U/L (ref 0–40)
BASOPHILS # BLD AUTO: 0.1 10E3/UL (ref 0–0.2)
BASOPHILS NFR BLD AUTO: 1 %
BILIRUB SERPL-MCNC: 0.3 MG/DL (ref 0–1)
BUN SERPL-MCNC: 41 MG/DL (ref 8–28)
CALCIUM SERPL-MCNC: 9.4 MG/DL (ref 8.5–10.5)
CHLORIDE BLD-SCNC: 111 MMOL/L (ref 98–107)
CO2 SERPL-SCNC: 21 MMOL/L (ref 22–31)
CREAT SERPL-MCNC: 0.89 MG/DL (ref 0.6–1.1)
EOSINOPHIL # BLD AUTO: 0.3 10E3/UL (ref 0–0.7)
EOSINOPHIL NFR BLD AUTO: 3 %
ERYTHROCYTE [DISTWIDTH] IN BLOOD BY AUTOMATED COUNT: 14.2 % (ref 10–15)
GFR SERPL CREATININE-BSD FRML MDRD: 59 ML/MIN/1.73M2
GLUCOSE BLD-MCNC: 100 MG/DL (ref 70–125)
HCT VFR BLD AUTO: 34 % (ref 35–47)
HGB BLD-MCNC: 10.6 G/DL (ref 11.7–15.7)
IMM GRANULOCYTES # BLD: 0.1 10E3/UL
IMM GRANULOCYTES NFR BLD: 2 %
LYMPHOCYTES # BLD AUTO: 2.1 10E3/UL (ref 0.8–5.3)
LYMPHOCYTES NFR BLD AUTO: 24 %
MCH RBC QN AUTO: 31.3 PG (ref 26.5–33)
MCHC RBC AUTO-ENTMCNC: 31.2 G/DL (ref 31.5–36.5)
MCV RBC AUTO: 100 FL (ref 78–100)
MONOCYTES # BLD AUTO: 0.7 10E3/UL (ref 0–1.3)
MONOCYTES NFR BLD AUTO: 8 %
NEUTROPHILS # BLD AUTO: 5.5 10E3/UL (ref 1.6–8.3)
NEUTROPHILS NFR BLD AUTO: 62 %
NRBC # BLD AUTO: 0 10E3/UL
NRBC BLD AUTO-RTO: 0 /100
PLATELET # BLD AUTO: 258 10E3/UL (ref 150–450)
POTASSIUM BLD-SCNC: 4.4 MMOL/L (ref 3.5–5)
PROT SERPL-MCNC: 6.4 G/DL (ref 6–8)
RBC # BLD AUTO: 3.39 10E6/UL (ref 3.8–5.2)
SODIUM SERPL-SCNC: 145 MMOL/L (ref 136–145)
WBC # BLD AUTO: 8.7 10E3/UL (ref 4–11)

## 2022-04-18 PROCEDURE — 85025 COMPLETE CBC W/AUTO DIFF WBC: CPT | Mod: ORL | Performed by: GENERAL PRACTICE

## 2022-04-18 PROCEDURE — 80053 COMPREHEN METABOLIC PANEL: CPT | Mod: ORL | Performed by: GENERAL PRACTICE

## 2022-04-18 PROCEDURE — P9603 ONE-WAY ALLOW PRORATED MILES: HCPCS | Mod: ORL | Performed by: GENERAL PRACTICE

## 2022-04-18 PROCEDURE — 36415 COLL VENOUS BLD VENIPUNCTURE: CPT | Mod: ORL | Performed by: GENERAL PRACTICE

## 2022-06-24 ENCOUNTER — LAB REQUISITION (OUTPATIENT)
Dept: LAB | Facility: CLINIC | Age: 87
End: 2022-06-24
Payer: COMMERCIAL

## 2022-06-24 DIAGNOSIS — E11.40 TYPE 2 DIABETES MELLITUS WITH DIABETIC NEUROPATHY, UNSPECIFIED (H): ICD-10-CM

## 2022-06-27 LAB — HBA1C MFR BLD: 5.2 %

## 2022-06-27 PROCEDURE — P9603 ONE-WAY ALLOW PRORATED MILES: HCPCS | Mod: ORL | Performed by: GENERAL PRACTICE

## 2022-06-27 PROCEDURE — 36415 COLL VENOUS BLD VENIPUNCTURE: CPT | Mod: ORL | Performed by: GENERAL PRACTICE

## 2022-06-27 PROCEDURE — 83036 HEMOGLOBIN GLYCOSYLATED A1C: CPT | Mod: ORL | Performed by: GENERAL PRACTICE

## 2022-10-24 ENCOUNTER — LAB REQUISITION (OUTPATIENT)
Dept: LAB | Facility: CLINIC | Age: 87
End: 2022-10-24
Payer: COMMERCIAL

## 2022-10-24 DIAGNOSIS — I10 ESSENTIAL (PRIMARY) HYPERTENSION: ICD-10-CM

## 2022-10-24 DIAGNOSIS — E11.40 TYPE 2 DIABETES MELLITUS WITH DIABETIC NEUROPATHY, UNSPECIFIED (H): ICD-10-CM

## 2022-10-24 DIAGNOSIS — J18.9 PNEUMONIA, UNSPECIFIED ORGANISM: ICD-10-CM

## 2022-10-24 DIAGNOSIS — R60.0 LOCALIZED EDEMA: ICD-10-CM

## 2022-10-28 LAB
ANION GAP SERPL CALCULATED.3IONS-SCNC: 10 MMOL/L (ref 7–15)
BUN SERPL-MCNC: 48.5 MG/DL (ref 8–23)
CALCIUM SERPL-MCNC: 9.8 MG/DL (ref 8.2–9.6)
CHLORIDE SERPL-SCNC: 107 MMOL/L (ref 98–107)
CREAT SERPL-MCNC: 1.04 MG/DL (ref 0.51–0.95)
DEPRECATED HCO3 PLAS-SCNC: 25 MMOL/L (ref 22–29)
ERYTHROCYTE [DISTWIDTH] IN BLOOD BY AUTOMATED COUNT: 14.7 % (ref 10–15)
GFR SERPL CREATININE-BSD FRML MDRD: 48 ML/MIN/1.73M2
GLUCOSE SERPL-MCNC: 94 MG/DL (ref 70–99)
HBA1C MFR BLD: 5.4 %
HCT VFR BLD AUTO: 34.4 % (ref 35–47)
HGB BLD-MCNC: 10.5 G/DL (ref 11.7–15.7)
MCH RBC QN AUTO: 30.4 PG (ref 26.5–33)
MCHC RBC AUTO-ENTMCNC: 30.5 G/DL (ref 31.5–36.5)
MCV RBC AUTO: 100 FL (ref 78–100)
PLATELET # BLD AUTO: 221 10E3/UL (ref 150–450)
POTASSIUM SERPL-SCNC: 4.6 MMOL/L (ref 3.4–5.3)
RBC # BLD AUTO: 3.45 10E6/UL (ref 3.8–5.2)
SODIUM SERPL-SCNC: 142 MMOL/L (ref 136–145)
WBC # BLD AUTO: 7 10E3/UL (ref 4–11)

## 2022-10-28 PROCEDURE — 83036 HEMOGLOBIN GLYCOSYLATED A1C: CPT | Mod: ORL | Performed by: GENERAL PRACTICE

## 2022-10-28 PROCEDURE — 85027 COMPLETE CBC AUTOMATED: CPT | Mod: ORL | Performed by: GENERAL PRACTICE

## 2022-10-28 PROCEDURE — P9604 ONE-WAY ALLOW PRORATED TRIP: HCPCS | Mod: ORL | Performed by: GENERAL PRACTICE

## 2022-10-28 PROCEDURE — 80048 BASIC METABOLIC PNL TOTAL CA: CPT | Mod: ORL | Performed by: GENERAL PRACTICE

## 2022-10-28 PROCEDURE — 36415 COLL VENOUS BLD VENIPUNCTURE: CPT | Mod: ORL | Performed by: GENERAL PRACTICE

## 2022-12-05 PROCEDURE — 87637 SARSCOV2&INF A&B&RSV AMP PRB: CPT | Mod: ORL | Performed by: GENERAL PRACTICE

## 2022-12-19 ENCOUNTER — LAB REQUISITION (OUTPATIENT)
Dept: LAB | Facility: CLINIC | Age: 87
End: 2022-12-19
Payer: COMMERCIAL

## 2022-12-19 DIAGNOSIS — R44.1 VISUAL HALLUCINATIONS: ICD-10-CM

## 2022-12-20 LAB
ANION GAP SERPL CALCULATED.3IONS-SCNC: 8 MMOL/L (ref 7–15)
BUN SERPL-MCNC: 25.5 MG/DL (ref 8–23)
CALCIUM SERPL-MCNC: 9.5 MG/DL (ref 8.2–9.6)
CHLORIDE SERPL-SCNC: 110 MMOL/L (ref 98–107)
CREAT SERPL-MCNC: 0.99 MG/DL (ref 0.51–0.95)
DEPRECATED HCO3 PLAS-SCNC: 26 MMOL/L (ref 22–29)
ERYTHROCYTE [DISTWIDTH] IN BLOOD BY AUTOMATED COUNT: 13.2 % (ref 10–15)
GFR SERPL CREATININE-BSD FRML MDRD: 51 ML/MIN/1.73M2
GLUCOSE SERPL-MCNC: 98 MG/DL (ref 70–99)
HCT VFR BLD AUTO: 35.7 % (ref 35–47)
HGB BLD-MCNC: 11 G/DL (ref 11.7–15.7)
MCH RBC QN AUTO: 31 PG (ref 26.5–33)
MCHC RBC AUTO-ENTMCNC: 30.8 G/DL (ref 31.5–36.5)
MCV RBC AUTO: 101 FL (ref 78–100)
PLATELET # BLD AUTO: 214 10E3/UL (ref 150–450)
POTASSIUM SERPL-SCNC: 4.3 MMOL/L (ref 3.4–5.3)
RBC # BLD AUTO: 3.55 10E6/UL (ref 3.8–5.2)
SODIUM SERPL-SCNC: 144 MMOL/L (ref 136–145)
WBC # BLD AUTO: 8.1 10E3/UL (ref 4–11)

## 2022-12-20 PROCEDURE — P9603 ONE-WAY ALLOW PRORATED MILES: HCPCS | Mod: ORL | Performed by: GENERAL PRACTICE

## 2022-12-20 PROCEDURE — 80048 BASIC METABOLIC PNL TOTAL CA: CPT | Mod: ORL | Performed by: GENERAL PRACTICE

## 2022-12-20 PROCEDURE — 36415 COLL VENOUS BLD VENIPUNCTURE: CPT | Mod: ORL | Performed by: GENERAL PRACTICE

## 2022-12-20 PROCEDURE — 85027 COMPLETE CBC AUTOMATED: CPT | Mod: ORL | Performed by: GENERAL PRACTICE

## 2022-12-21 ENCOUNTER — LAB REQUISITION (OUTPATIENT)
Dept: LAB | Facility: CLINIC | Age: 87
End: 2022-12-21
Payer: COMMERCIAL

## 2022-12-21 DIAGNOSIS — R60.0 LOCALIZED EDEMA: ICD-10-CM

## 2022-12-21 DIAGNOSIS — R44.1 VISUAL HALLUCINATIONS: ICD-10-CM

## 2022-12-21 LAB
ANION GAP SERPL CALCULATED.3IONS-SCNC: 15 MMOL/L (ref 7–15)
BUN SERPL-MCNC: 30 MG/DL (ref 8–23)
CALCIUM SERPL-MCNC: 10.2 MG/DL (ref 8.2–9.6)
CHLORIDE SERPL-SCNC: 105 MMOL/L (ref 98–107)
CREAT SERPL-MCNC: 1.03 MG/DL (ref 0.51–0.95)
DEPRECATED HCO3 PLAS-SCNC: 24 MMOL/L (ref 22–29)
ERYTHROCYTE [DISTWIDTH] IN BLOOD BY AUTOMATED COUNT: 13.3 % (ref 10–15)
GFR SERPL CREATININE-BSD FRML MDRD: 49 ML/MIN/1.73M2
GLUCOSE SERPL-MCNC: 157 MG/DL (ref 70–99)
HCT VFR BLD AUTO: 40.4 % (ref 35–47)
HGB BLD-MCNC: 12.3 G/DL (ref 11.7–15.7)
MCH RBC QN AUTO: 31.1 PG (ref 26.5–33)
MCHC RBC AUTO-ENTMCNC: 30.4 G/DL (ref 31.5–36.5)
MCV RBC AUTO: 102 FL (ref 78–100)
PLATELET # BLD AUTO: 267 10E3/UL (ref 150–450)
POTASSIUM SERPL-SCNC: 4 MMOL/L (ref 3.4–5.3)
RBC # BLD AUTO: 3.96 10E6/UL (ref 3.8–5.2)
SODIUM SERPL-SCNC: 144 MMOL/L (ref 136–145)
WBC # BLD AUTO: 9.1 10E3/UL (ref 4–11)

## 2022-12-21 PROCEDURE — 36415 COLL VENOUS BLD VENIPUNCTURE: CPT | Mod: ORL | Performed by: GENERAL PRACTICE

## 2022-12-21 PROCEDURE — 80048 BASIC METABOLIC PNL TOTAL CA: CPT | Mod: ORL | Performed by: GENERAL PRACTICE

## 2022-12-21 PROCEDURE — 85027 COMPLETE CBC AUTOMATED: CPT | Mod: ORL | Performed by: GENERAL PRACTICE

## 2023-01-01 ENCOUNTER — LAB REQUISITION (OUTPATIENT)
Dept: LAB | Facility: CLINIC | Age: 88
End: 2023-01-01
Payer: COMMERCIAL

## 2023-01-01 DIAGNOSIS — N18.32 CHRONIC KIDNEY DISEASE, STAGE 3B (H): ICD-10-CM

## 2023-01-01 DIAGNOSIS — E11.40 TYPE 2 DIABETES MELLITUS WITH DIABETIC NEUROPATHY, UNSPECIFIED (H): ICD-10-CM

## 2023-01-01 LAB
ANION GAP SERPL CALCULATED.3IONS-SCNC: 11 MMOL/L (ref 7–15)
ANION GAP SERPL CALCULATED.3IONS-SCNC: 16 MMOL/L (ref 7–15)
BUN SERPL-MCNC: 25.8 MG/DL (ref 8–23)
BUN SERPL-MCNC: 41 MG/DL (ref 8–23)
CALCIUM SERPL-MCNC: 9.1 MG/DL (ref 8.2–9.6)
CALCIUM SERPL-MCNC: 9.9 MG/DL (ref 8.2–9.6)
CHLORIDE SERPL-SCNC: 108 MMOL/L (ref 98–107)
CHLORIDE SERPL-SCNC: 109 MMOL/L (ref 98–107)
CREAT SERPL-MCNC: 1.06 MG/DL (ref 0.51–0.95)
CREAT SERPL-MCNC: 1.06 MG/DL (ref 0.51–0.95)
DEPRECATED HCO3 PLAS-SCNC: 20 MMOL/L (ref 22–29)
DEPRECATED HCO3 PLAS-SCNC: 21 MMOL/L (ref 22–29)
EGFRCR SERPLBLD CKD-EPI 2021: 47 ML/MIN/1.73M2
EGFRCR SERPLBLD CKD-EPI 2021: 47 ML/MIN/1.73M2
GLUCOSE SERPL-MCNC: 129 MG/DL (ref 70–99)
GLUCOSE SERPL-MCNC: 97 MG/DL (ref 70–99)
HBA1C MFR BLD: 5.5 %
POTASSIUM SERPL-SCNC: 4.2 MMOL/L (ref 3.4–5.3)
POTASSIUM SERPL-SCNC: 4.3 MMOL/L (ref 3.4–5.3)
SODIUM SERPL-SCNC: 141 MMOL/L (ref 135–145)
SODIUM SERPL-SCNC: 144 MMOL/L (ref 135–145)

## 2023-01-01 PROCEDURE — P9604 ONE-WAY ALLOW PRORATED TRIP: HCPCS | Mod: ORL | Performed by: FAMILY MEDICINE

## 2023-01-01 PROCEDURE — 80048 BASIC METABOLIC PNL TOTAL CA: CPT | Mod: ORL | Performed by: FAMILY MEDICINE

## 2023-01-01 PROCEDURE — 83036 HEMOGLOBIN GLYCOSYLATED A1C: CPT | Mod: ORL | Performed by: FAMILY MEDICINE

## 2023-01-01 PROCEDURE — P9603 ONE-WAY ALLOW PRORATED MILES: HCPCS | Mod: ORL | Performed by: FAMILY MEDICINE

## 2023-01-01 PROCEDURE — 36415 COLL VENOUS BLD VENIPUNCTURE: CPT | Mod: ORL | Performed by: FAMILY MEDICINE

## 2023-04-19 ENCOUNTER — LAB REQUISITION (OUTPATIENT)
Dept: LAB | Facility: CLINIC | Age: 88
End: 2023-04-19
Payer: COMMERCIAL

## 2023-04-19 DIAGNOSIS — J30.9 ALLERGIC RHINITIS, UNSPECIFIED: ICD-10-CM

## 2023-04-19 DIAGNOSIS — J18.9 PNEUMONIA, UNSPECIFIED ORGANISM: ICD-10-CM

## 2023-04-19 DIAGNOSIS — M19.012 PRIMARY OSTEOARTHRITIS, LEFT SHOULDER: ICD-10-CM

## 2023-04-19 DIAGNOSIS — R60.0 LOCALIZED EDEMA: ICD-10-CM

## 2023-04-19 DIAGNOSIS — Z13.820 ENCOUNTER FOR SCREENING FOR OSTEOPOROSIS: ICD-10-CM

## 2023-04-19 DIAGNOSIS — E55.9 VITAMIN D DEFICIENCY, UNSPECIFIED: ICD-10-CM

## 2023-04-19 DIAGNOSIS — E11.9 TYPE 2 DIABETES MELLITUS WITHOUT COMPLICATIONS (H): ICD-10-CM

## 2023-04-19 DIAGNOSIS — I10 ESSENTIAL (PRIMARY) HYPERTENSION: ICD-10-CM

## 2023-04-19 DIAGNOSIS — G60.9 HEREDITARY AND IDIOPATHIC NEUROPATHY, UNSPECIFIED: ICD-10-CM

## 2023-04-20 LAB
ALBUMIN SERPL BCG-MCNC: 4 G/DL (ref 3.5–5.2)
ALP SERPL-CCNC: 58 U/L (ref 35–104)
ALT SERPL W P-5'-P-CCNC: 9 U/L (ref 10–35)
ANION GAP SERPL CALCULATED.3IONS-SCNC: 11 MMOL/L (ref 7–15)
AST SERPL W P-5'-P-CCNC: 20 U/L (ref 10–35)
BILIRUB SERPL-MCNC: 0.4 MG/DL
BUN SERPL-MCNC: 43.8 MG/DL (ref 8–23)
CALCIUM SERPL-MCNC: 10.4 MG/DL (ref 8.2–9.6)
CHLORIDE SERPL-SCNC: 105 MMOL/L (ref 98–107)
CREAT SERPL-MCNC: 1.16 MG/DL (ref 0.51–0.95)
DEPRECATED CALCIDIOL+CALCIFEROL SERPL-MC: 55 UG/L (ref 20–75)
DEPRECATED HCO3 PLAS-SCNC: 23 MMOL/L (ref 22–29)
ERYTHROCYTE [DISTWIDTH] IN BLOOD BY AUTOMATED COUNT: 14 % (ref 10–15)
GFR SERPL CREATININE-BSD FRML MDRD: 42 ML/MIN/1.73M2
GLUCOSE SERPL-MCNC: 116 MG/DL (ref 70–99)
HCT VFR BLD AUTO: 41.5 % (ref 35–47)
HGB BLD-MCNC: 12.8 G/DL (ref 11.7–15.7)
MAGNESIUM SERPL-MCNC: 2 MG/DL (ref 1.7–2.3)
MCH RBC QN AUTO: 31.4 PG (ref 26.5–33)
MCHC RBC AUTO-ENTMCNC: 30.8 G/DL (ref 31.5–36.5)
MCV RBC AUTO: 102 FL (ref 78–100)
PLATELET # BLD AUTO: 233 10E3/UL (ref 150–450)
POTASSIUM SERPL-SCNC: 4.5 MMOL/L (ref 3.4–5.3)
PROT SERPL-MCNC: 8 G/DL (ref 6.4–8.3)
RBC # BLD AUTO: 4.07 10E6/UL (ref 3.8–5.2)
SODIUM SERPL-SCNC: 139 MMOL/L (ref 136–145)
TSH SERPL DL<=0.005 MIU/L-ACNC: 2.39 UIU/ML (ref 0.3–4.2)
WBC # BLD AUTO: 8.1 10E3/UL (ref 4–11)

## 2023-04-20 PROCEDURE — 84443 ASSAY THYROID STIM HORMONE: CPT | Mod: ORL | Performed by: INTERNAL MEDICINE

## 2023-04-20 PROCEDURE — 36415 COLL VENOUS BLD VENIPUNCTURE: CPT | Mod: ORL | Performed by: INTERNAL MEDICINE

## 2023-04-20 PROCEDURE — P9604 ONE-WAY ALLOW PRORATED TRIP: HCPCS | Mod: ORL | Performed by: INTERNAL MEDICINE

## 2023-04-20 PROCEDURE — 83735 ASSAY OF MAGNESIUM: CPT | Mod: ORL | Performed by: INTERNAL MEDICINE

## 2023-04-20 PROCEDURE — 82306 VITAMIN D 25 HYDROXY: CPT | Mod: ORL | Performed by: INTERNAL MEDICINE

## 2023-04-20 PROCEDURE — 80053 COMPREHEN METABOLIC PANEL: CPT | Mod: ORL | Performed by: INTERNAL MEDICINE

## 2023-04-20 PROCEDURE — 85027 COMPLETE CBC AUTOMATED: CPT | Mod: ORL | Performed by: INTERNAL MEDICINE

## 2023-04-30 ENCOUNTER — LAB REQUISITION (OUTPATIENT)
Dept: LAB | Facility: CLINIC | Age: 88
End: 2023-04-30
Payer: COMMERCIAL

## 2023-04-30 DIAGNOSIS — E58 DIETARY CALCIUM DEFICIENCY: ICD-10-CM

## 2023-05-01 ENCOUNTER — LAB REQUISITION (OUTPATIENT)
Dept: LAB | Facility: CLINIC | Age: 88
End: 2023-05-01
Payer: COMMERCIAL

## 2023-05-01 DIAGNOSIS — J18.9 PNEUMONIA, UNSPECIFIED ORGANISM: ICD-10-CM

## 2023-05-01 DIAGNOSIS — M19.012 PRIMARY OSTEOARTHRITIS, LEFT SHOULDER: ICD-10-CM

## 2023-05-01 DIAGNOSIS — R60.0 LOCALIZED EDEMA: ICD-10-CM

## 2023-05-01 DIAGNOSIS — I10 ESSENTIAL (PRIMARY) HYPERTENSION: ICD-10-CM

## 2023-05-01 DIAGNOSIS — J30.9 ALLERGIC RHINITIS, UNSPECIFIED: ICD-10-CM

## 2023-05-01 LAB — CALCIUM SERPL-MCNC: 10 MG/DL (ref 8.2–9.6)

## 2023-05-01 PROCEDURE — 36415 COLL VENOUS BLD VENIPUNCTURE: CPT | Mod: ORL | Performed by: INTERNAL MEDICINE

## 2023-05-01 PROCEDURE — P9604 ONE-WAY ALLOW PRORATED TRIP: HCPCS | Mod: ORL | Performed by: INTERNAL MEDICINE

## 2023-05-01 PROCEDURE — 82310 ASSAY OF CALCIUM: CPT | Mod: ORL | Performed by: INTERNAL MEDICINE

## 2023-05-02 LAB
ANION GAP SERPL CALCULATED.3IONS-SCNC: 11 MMOL/L (ref 7–15)
BUN SERPL-MCNC: 47.5 MG/DL (ref 8–23)
CALCIUM SERPL-MCNC: 9.5 MG/DL (ref 8.2–9.6)
CHLORIDE SERPL-SCNC: 108 MMOL/L (ref 98–107)
CREAT SERPL-MCNC: 1.14 MG/DL (ref 0.51–0.95)
DEPRECATED HCO3 PLAS-SCNC: 23 MMOL/L (ref 22–29)
FOLATE SERPL-MCNC: >40 NG/ML (ref 4.6–34.8)
GFR SERPL CREATININE-BSD FRML MDRD: 43 ML/MIN/1.73M2
GLUCOSE SERPL-MCNC: 102 MG/DL (ref 70–99)
POTASSIUM SERPL-SCNC: 4.1 MMOL/L (ref 3.4–5.3)
SODIUM SERPL-SCNC: 142 MMOL/L (ref 136–145)
VIT B12 SERPL-MCNC: 716 PG/ML (ref 232–1245)

## 2023-05-02 PROCEDURE — 82607 VITAMIN B-12: CPT | Mod: ORL | Performed by: INTERNAL MEDICINE

## 2023-05-02 PROCEDURE — 36415 COLL VENOUS BLD VENIPUNCTURE: CPT | Mod: ORL | Performed by: INTERNAL MEDICINE

## 2023-05-02 PROCEDURE — 80048 BASIC METABOLIC PNL TOTAL CA: CPT | Mod: ORL | Performed by: INTERNAL MEDICINE

## 2023-05-02 PROCEDURE — P9604 ONE-WAY ALLOW PRORATED TRIP: HCPCS | Mod: ORL | Performed by: INTERNAL MEDICINE

## 2023-05-02 PROCEDURE — 82746 ASSAY OF FOLIC ACID SERUM: CPT | Mod: ORL | Performed by: INTERNAL MEDICINE

## 2023-05-04 ENCOUNTER — LAB REQUISITION (OUTPATIENT)
Dept: LAB | Facility: CLINIC | Age: 88
End: 2023-05-04
Payer: COMMERCIAL

## 2023-05-04 DIAGNOSIS — N18.9 CHRONIC KIDNEY DISEASE, UNSPECIFIED: ICD-10-CM

## 2023-05-08 LAB
ANION GAP SERPL CALCULATED.3IONS-SCNC: 12 MMOL/L (ref 7–15)
BUN SERPL-MCNC: 46.4 MG/DL (ref 8–23)
CALCIUM SERPL-MCNC: 9.9 MG/DL (ref 8.2–9.6)
CHLORIDE SERPL-SCNC: 109 MMOL/L (ref 98–107)
CREAT SERPL-MCNC: 1.16 MG/DL (ref 0.51–0.95)
DEPRECATED HCO3 PLAS-SCNC: 21 MMOL/L (ref 22–29)
GFR SERPL CREATININE-BSD FRML MDRD: 42 ML/MIN/1.73M2
GLUCOSE SERPL-MCNC: 93 MG/DL (ref 70–99)
POTASSIUM SERPL-SCNC: 4.3 MMOL/L (ref 3.4–5.3)
SODIUM SERPL-SCNC: 142 MMOL/L (ref 136–145)

## 2023-05-08 PROCEDURE — 80048 BASIC METABOLIC PNL TOTAL CA: CPT | Mod: ORL | Performed by: INTERNAL MEDICINE

## 2023-05-08 PROCEDURE — 36415 COLL VENOUS BLD VENIPUNCTURE: CPT | Mod: ORL | Performed by: INTERNAL MEDICINE

## 2023-05-08 PROCEDURE — P9603 ONE-WAY ALLOW PRORATED MILES: HCPCS | Mod: ORL | Performed by: INTERNAL MEDICINE

## 2024-01-01 ENCOUNTER — APPOINTMENT (OUTPATIENT)
Dept: SPEECH THERAPY | Facility: CLINIC | Age: 89
End: 2024-01-01
Payer: COMMERCIAL

## 2024-01-01 ENCOUNTER — DOCUMENTATION ONLY (OUTPATIENT)
Dept: OTHER | Facility: CLINIC | Age: 89
End: 2024-01-01
Payer: COMMERCIAL

## 2024-01-01 ENCOUNTER — HOSPITAL ENCOUNTER (OUTPATIENT)
Facility: CLINIC | Age: 89
Setting detail: OBSERVATION
Discharge: NURSING, REHAB OR LONG-TERM CARE | End: 2024-05-23
Attending: EMERGENCY MEDICINE | Admitting: EMERGENCY MEDICINE
Payer: COMMERCIAL

## 2024-01-01 ENCOUNTER — APPOINTMENT (OUTPATIENT)
Dept: GENERAL RADIOLOGY | Facility: CLINIC | Age: 89
End: 2024-01-01
Attending: EMERGENCY MEDICINE
Payer: COMMERCIAL

## 2024-01-01 ENCOUNTER — LAB REQUISITION (OUTPATIENT)
Dept: LAB | Facility: CLINIC | Age: 89
End: 2024-01-01
Payer: COMMERCIAL

## 2024-01-01 ENCOUNTER — APPOINTMENT (OUTPATIENT)
Dept: SPEECH THERAPY | Facility: CLINIC | Age: 89
End: 2024-01-01
Attending: INTERNAL MEDICINE
Payer: COMMERCIAL

## 2024-01-01 VITALS
OXYGEN SATURATION: 93 % | TEMPERATURE: 99.1 F | SYSTOLIC BLOOD PRESSURE: 162 MMHG | DIASTOLIC BLOOD PRESSURE: 75 MMHG | BODY MASS INDEX: 28.99 KG/M2 | WEIGHT: 158.51 LBS | HEART RATE: 75 BPM | RESPIRATION RATE: 20 BRPM

## 2024-01-01 DIAGNOSIS — J69.0 ASPIRATION PNEUMONITIS (H): ICD-10-CM

## 2024-01-01 DIAGNOSIS — J98.8 AIRWAY OBSTRUCTION: ICD-10-CM

## 2024-01-01 DIAGNOSIS — D64.9 ANEMIA, UNSPECIFIED: ICD-10-CM

## 2024-01-01 DIAGNOSIS — Z51.5 HOSPICE CARE PATIENT: Primary | ICD-10-CM

## 2024-01-01 LAB
ANION GAP SERPL CALCULATED.3IONS-SCNC: 11 MMOL/L (ref 7–15)
ANION GAP SERPL CALCULATED.3IONS-SCNC: 15 MMOL/L (ref 7–15)
BACTERIA BLD CULT: NO GROWTH
BACTERIA BLD CULT: NO GROWTH
BASOPHILS # BLD AUTO: 0.1 10E3/UL (ref 0–0.2)
BASOPHILS NFR BLD AUTO: 1 %
BUN SERPL-MCNC: 28.7 MG/DL (ref 8–23)
BUN SERPL-MCNC: 32.2 MG/DL (ref 8–23)
CALCIUM SERPL-MCNC: 9.3 MG/DL (ref 8.2–9.6)
CALCIUM SERPL-MCNC: 9.5 MG/DL (ref 8.2–9.6)
CHLORIDE SERPL-SCNC: 103 MMOL/L (ref 98–107)
CHLORIDE SERPL-SCNC: 108 MMOL/L (ref 98–107)
CREAT SERPL-MCNC: 1.08 MG/DL (ref 0.51–0.95)
CREAT SERPL-MCNC: 1.1 MG/DL (ref 0.51–0.95)
DEPRECATED HCO3 PLAS-SCNC: 21 MMOL/L (ref 22–29)
DEPRECATED HCO3 PLAS-SCNC: 24 MMOL/L (ref 22–29)
EGFRCR SERPLBLD CKD-EPI 2021: 45 ML/MIN/1.73M2
EGFRCR SERPLBLD CKD-EPI 2021: 46 ML/MIN/1.73M2
EOSINOPHIL # BLD AUTO: 0.2 10E3/UL (ref 0–0.7)
EOSINOPHIL NFR BLD AUTO: 3 %
ERYTHROCYTE [DISTWIDTH] IN BLOOD BY AUTOMATED COUNT: 13.4 % (ref 10–15)
ERYTHROCYTE [DISTWIDTH] IN BLOOD BY AUTOMATED COUNT: 13.9 % (ref 10–15)
GLUCOSE SERPL-MCNC: 104 MG/DL (ref 70–99)
GLUCOSE SERPL-MCNC: 203 MG/DL (ref 70–99)
HCT VFR BLD AUTO: 37.3 % (ref 35–47)
HCT VFR BLD AUTO: 40.4 % (ref 35–47)
HGB BLD-MCNC: 11.5 G/DL (ref 11.7–15.7)
HGB BLD-MCNC: 12.8 G/DL (ref 11.7–15.7)
IMM GRANULOCYTES # BLD: 0.1 10E3/UL
IMM GRANULOCYTES NFR BLD: 1 %
LACTATE SERPL-SCNC: 0.9 MMOL/L (ref 0.7–2)
LACTATE SERPL-SCNC: 1 MMOL/L (ref 0.7–2)
LYMPHOCYTES # BLD AUTO: 2.2 10E3/UL (ref 0.8–5.3)
LYMPHOCYTES NFR BLD AUTO: 25 %
MCH RBC QN AUTO: 30.7 PG (ref 26.5–33)
MCH RBC QN AUTO: 31.5 PG (ref 26.5–33)
MCHC RBC AUTO-ENTMCNC: 30.8 G/DL (ref 31.5–36.5)
MCHC RBC AUTO-ENTMCNC: 31.7 G/DL (ref 31.5–36.5)
MCV RBC AUTO: 100 FL (ref 78–100)
MCV RBC AUTO: 100 FL (ref 78–100)
MONOCYTES # BLD AUTO: 0.8 10E3/UL (ref 0–1.3)
MONOCYTES NFR BLD AUTO: 9 %
NEUTROPHILS # BLD AUTO: 5.6 10E3/UL (ref 1.6–8.3)
NEUTROPHILS NFR BLD AUTO: 61 %
NRBC # BLD AUTO: 0 10E3/UL
NRBC BLD AUTO-RTO: 0 /100
PLATELET # BLD AUTO: 218 10E3/UL (ref 150–450)
PLATELET # BLD AUTO: 222 10E3/UL (ref 150–450)
POTASSIUM SERPL-SCNC: 3.8 MMOL/L (ref 3.4–5.3)
POTASSIUM SERPL-SCNC: 3.9 MMOL/L (ref 3.4–5.3)
RBC # BLD AUTO: 3.74 10E6/UL (ref 3.8–5.2)
RBC # BLD AUTO: 4.06 10E6/UL (ref 3.8–5.2)
SODIUM SERPL-SCNC: 139 MMOL/L (ref 135–145)
SODIUM SERPL-SCNC: 143 MMOL/L (ref 135–145)
VIT B12 SERPL-MCNC: 875 PG/ML (ref 232–1245)
WBC # BLD AUTO: 7.8 10E3/UL (ref 4–11)
WBC # BLD AUTO: 8.9 10E3/UL (ref 4–11)

## 2024-01-01 PROCEDURE — P9604 ONE-WAY ALLOW PRORATED TRIP: HCPCS | Mod: ORL

## 2024-01-01 PROCEDURE — 83605 ASSAY OF LACTIC ACID: CPT | Performed by: PHYSICIAN ASSISTANT

## 2024-01-01 PROCEDURE — 85027 COMPLETE CBC AUTOMATED: CPT | Mod: ORL

## 2024-01-01 PROCEDURE — 99223 1ST HOSP IP/OBS HIGH 75: CPT | Performed by: NURSE PRACTITIONER

## 2024-01-01 PROCEDURE — 80048 BASIC METABOLIC PNL TOTAL CA: CPT | Performed by: EMERGENCY MEDICINE

## 2024-01-01 PROCEDURE — G0378 HOSPITAL OBSERVATION PER HR: HCPCS

## 2024-01-01 PROCEDURE — 96375 TX/PRO/DX INJ NEW DRUG ADDON: CPT

## 2024-01-01 PROCEDURE — 36415 COLL VENOUS BLD VENIPUNCTURE: CPT | Performed by: PHYSICIAN ASSISTANT

## 2024-01-01 PROCEDURE — 250N000011 HC RX IP 250 OP 636: Performed by: PHYSICIAN ASSISTANT

## 2024-01-01 PROCEDURE — 96361 HYDRATE IV INFUSION ADD-ON: CPT

## 2024-01-01 PROCEDURE — 250N000009 HC RX 250: Performed by: HOSPITALIST

## 2024-01-01 PROCEDURE — 250N000009 HC RX 250: Performed by: EMERGENCY MEDICINE

## 2024-01-01 PROCEDURE — 250N000009 HC RX 250: Performed by: PHYSICIAN ASSISTANT

## 2024-01-01 PROCEDURE — 92610 EVALUATE SWALLOWING FUNCTION: CPT | Mod: GN

## 2024-01-01 PROCEDURE — 99239 HOSP IP/OBS DSCHRG MGMT >30: CPT | Performed by: PHYSICIAN ASSISTANT

## 2024-01-01 PROCEDURE — 96365 THER/PROPH/DIAG IV INF INIT: CPT

## 2024-01-01 PROCEDURE — 258N000003 HC RX IP 258 OP 636: Performed by: HOSPITALIST

## 2024-01-01 PROCEDURE — 999N000157 HC STATISTIC RCP TIME EA 10 MIN

## 2024-01-01 PROCEDURE — 250N000011 HC RX IP 250 OP 636: Performed by: HOSPITALIST

## 2024-01-01 PROCEDURE — 96366 THER/PROPH/DIAG IV INF ADDON: CPT

## 2024-01-01 PROCEDURE — 250N000011 HC RX IP 250 OP 636: Performed by: INTERNAL MEDICINE

## 2024-01-01 PROCEDURE — 36415 COLL VENOUS BLD VENIPUNCTURE: CPT | Mod: ORL

## 2024-01-01 PROCEDURE — 250N000013 HC RX MED GY IP 250 OP 250 PS 637: Performed by: HOSPITALIST

## 2024-01-01 PROCEDURE — 92526 ORAL FUNCTION THERAPY: CPT | Mod: GN

## 2024-01-01 PROCEDURE — 250N000013 HC RX MED GY IP 250 OP 250 PS 637: Performed by: INTERNAL MEDICINE

## 2024-01-01 PROCEDURE — 99232 SBSQ HOSP IP/OBS MODERATE 35: CPT | Performed by: PHYSICIAN ASSISTANT

## 2024-01-01 PROCEDURE — 85025 COMPLETE CBC W/AUTO DIFF WBC: CPT | Performed by: EMERGENCY MEDICINE

## 2024-01-01 PROCEDURE — 80048 BASIC METABOLIC PNL TOTAL CA: CPT | Mod: ORL

## 2024-01-01 PROCEDURE — 96376 TX/PRO/DX INJ SAME DRUG ADON: CPT

## 2024-01-01 PROCEDURE — 82607 VITAMIN B-12: CPT | Mod: ORL

## 2024-01-01 PROCEDURE — 36415 COLL VENOUS BLD VENIPUNCTURE: CPT | Performed by: EMERGENCY MEDICINE

## 2024-01-01 PROCEDURE — 87040 BLOOD CULTURE FOR BACTERIA: CPT | Performed by: PHYSICIAN ASSISTANT

## 2024-01-01 PROCEDURE — 31525 DX LARYNGOSCOPY EXCL NB: CPT

## 2024-01-01 PROCEDURE — 99232 SBSQ HOSP IP/OBS MODERATE 35: CPT | Performed by: HOSPITALIST

## 2024-01-01 PROCEDURE — 99291 CRITICAL CARE FIRST HOUR: CPT | Mod: 25

## 2024-01-01 PROCEDURE — 99222 1ST HOSP IP/OBS MODERATE 55: CPT | Performed by: INTERNAL MEDICINE

## 2024-01-01 PROCEDURE — 71045 X-RAY EXAM CHEST 1 VIEW: CPT

## 2024-01-01 RX ORDER — NALOXONE HYDROCHLORIDE 0.4 MG/ML
0.4 INJECTION, SOLUTION INTRAMUSCULAR; INTRAVENOUS; SUBCUTANEOUS
Status: DISCONTINUED | OUTPATIENT
Start: 2024-01-01 | End: 2024-01-01 | Stop reason: HOSPADM

## 2024-01-01 RX ORDER — KETOTIFEN FUMARATE 0.35 MG/ML
1 SOLUTION/ DROPS OPHTHALMIC 2 TIMES DAILY
COMMUNITY

## 2024-01-01 RX ORDER — ACETAMINOPHEN 650 MG/1
650 SUPPOSITORY RECTAL EVERY 4 HOURS PRN
Qty: 2 SUPPOSITORY | Refills: 0 | Status: SHIPPED | OUTPATIENT
Start: 2024-01-01

## 2024-01-01 RX ORDER — MINERAL OIL/HYDROPHIL PETROLAT
OINTMENT (GRAM) TOPICAL
Status: DISCONTINUED | OUTPATIENT
Start: 2024-01-01 | End: 2024-01-01 | Stop reason: HOSPADM

## 2024-01-01 RX ORDER — LORAZEPAM 2 MG/ML
0.5 CONCENTRATE ORAL EVERY 4 HOURS PRN
Status: DISCONTINUED | OUTPATIENT
Start: 2024-01-01 | End: 2024-01-01 | Stop reason: HOSPADM

## 2024-01-01 RX ORDER — HALOPERIDOL 2 MG/ML
0.5 SOLUTION ORAL EVERY 6 HOURS PRN
Qty: 15 ML | Refills: 0 | Status: SHIPPED | OUTPATIENT
Start: 2024-01-01 | End: 2024-01-01

## 2024-01-01 RX ORDER — SENNOSIDES 8.6 MG
1 TABLET ORAL 2 TIMES DAILY PRN
Status: DISCONTINUED | OUTPATIENT
Start: 2024-01-01 | End: 2024-01-01 | Stop reason: HOSPADM

## 2024-01-01 RX ORDER — AMOXICILLIN 250 MG
1 CAPSULE ORAL EVERY OTHER DAY
COMMUNITY

## 2024-01-01 RX ORDER — DEXTROSE MONOHYDRATE, SODIUM CHLORIDE, AND POTASSIUM CHLORIDE 50; 1.49; 4.5 G/1000ML; G/1000ML; G/1000ML
INJECTION, SOLUTION INTRAVENOUS CONTINUOUS
Status: DISCONTINUED | OUTPATIENT
Start: 2024-01-01 | End: 2024-01-01

## 2024-01-01 RX ORDER — ATROPINE SULFATE 10 MG/ML
2 SOLUTION/ DROPS OPHTHALMIC EVERY 4 HOURS PRN
Status: DISCONTINUED | OUTPATIENT
Start: 2024-01-01 | End: 2024-01-01 | Stop reason: HOSPADM

## 2024-01-01 RX ORDER — AMPICILLIN AND SULBACTAM 2; 1 G/1; G/1
3 INJECTION, POWDER, FOR SOLUTION INTRAMUSCULAR; INTRAVENOUS ONCE
Status: COMPLETED | OUTPATIENT
Start: 2024-01-01 | End: 2024-01-01

## 2024-01-01 RX ORDER — ACETAMINOPHEN 500 MG
1000 TABLET ORAL 3 TIMES DAILY
COMMUNITY

## 2024-01-01 RX ORDER — HALOPERIDOL 2 MG/ML
1 SOLUTION ORAL
Status: DISCONTINUED | OUTPATIENT
Start: 2024-01-01 | End: 2024-01-01 | Stop reason: HOSPADM

## 2024-01-01 RX ORDER — AMOXICILLIN 250 MG
2 CAPSULE ORAL 2 TIMES DAILY PRN
Status: DISCONTINUED | OUTPATIENT
Start: 2024-01-01 | End: 2024-01-01 | Stop reason: HOSPADM

## 2024-01-01 RX ORDER — OXYCODONE HCL 20 MG/ML
2.5 CONCENTRATE, ORAL ORAL
Qty: 30 ML | Refills: 0 | Status: SHIPPED | OUTPATIENT
Start: 2024-01-01 | End: 2024-01-01

## 2024-01-01 RX ORDER — LORAZEPAM 2 MG/ML
0.5 CONCENTRATE ORAL EVERY 4 HOURS PRN
Qty: 30 ML | Refills: 0 | Status: SHIPPED | OUTPATIENT
Start: 2024-01-01 | End: 2024-01-01

## 2024-01-01 RX ORDER — POLYETHYLENE GLYCOL 3350 17 G/17G
17 POWDER, FOR SOLUTION ORAL DAILY PRN
Status: DISCONTINUED | OUTPATIENT
Start: 2024-01-01 | End: 2024-01-01 | Stop reason: HOSPADM

## 2024-01-01 RX ORDER — BISACODYL 10 MG
10 SUPPOSITORY, RECTAL RECTAL
Status: DISCONTINUED | OUTPATIENT
Start: 2024-01-01 | End: 2024-01-01 | Stop reason: HOSPADM

## 2024-01-01 RX ORDER — AMOXICILLIN 250 MG
1 CAPSULE ORAL 2 TIMES DAILY PRN
Status: DISCONTINUED | OUTPATIENT
Start: 2024-01-01 | End: 2024-01-01 | Stop reason: HOSPADM

## 2024-01-01 RX ORDER — METOPROLOL TARTRATE 1 MG/ML
5 INJECTION, SOLUTION INTRAVENOUS EVERY 6 HOURS
Status: DISCONTINUED | OUTPATIENT
Start: 2024-01-01 | End: 2024-01-01 | Stop reason: HOSPADM

## 2024-01-01 RX ORDER — CEFTRIAXONE 1 G/1
1 INJECTION, POWDER, FOR SOLUTION INTRAMUSCULAR; INTRAVENOUS
Status: DISCONTINUED | OUTPATIENT
Start: 2024-01-01 | End: 2024-01-01 | Stop reason: HOSPADM

## 2024-01-01 RX ORDER — LATANOPROST 50 UG/ML
1 SOLUTION/ DROPS OPHTHALMIC AT BEDTIME
Status: DISCONTINUED | OUTPATIENT
Start: 2024-01-01 | End: 2024-01-01 | Stop reason: HOSPADM

## 2024-01-01 RX ORDER — KETOTIFEN FUMARATE 0.35 MG/ML
1 SOLUTION/ DROPS OPHTHALMIC 2 TIMES DAILY
Status: DISCONTINUED | OUTPATIENT
Start: 2024-01-01 | End: 2024-01-01 | Stop reason: HOSPADM

## 2024-01-01 RX ORDER — AMLODIPINE BESYLATE 2.5 MG/1
2.5 TABLET ORAL 2 TIMES DAILY
Status: DISCONTINUED | OUTPATIENT
Start: 2024-01-01 | End: 2024-01-01 | Stop reason: HOSPADM

## 2024-01-01 RX ORDER — OXYCODONE HCL 20 MG/ML
2.5 CONCENTRATE, ORAL ORAL
Qty: 30 ML | Refills: 0 | Status: SHIPPED | OUTPATIENT
Start: 2024-01-01

## 2024-01-01 RX ORDER — POLYETHYLENE GLYCOL 3350 17 G/17G
17 POWDER, FOR SOLUTION ORAL DAILY PRN
COMMUNITY

## 2024-01-01 RX ORDER — PANTOPRAZOLE SODIUM 20 MG/1
20 TABLET, DELAYED RELEASE ORAL
Status: DISCONTINUED | OUTPATIENT
Start: 2024-01-01 | End: 2024-01-01

## 2024-01-01 RX ORDER — OXYCODONE HCL 20 MG/ML
5 CONCENTRATE, ORAL ORAL
Status: DISCONTINUED | OUTPATIENT
Start: 2024-01-01 | End: 2024-01-01 | Stop reason: HOSPADM

## 2024-01-01 RX ORDER — AMPICILLIN AND SULBACTAM 2; 1 G/1; G/1
3 INJECTION, POWDER, FOR SOLUTION INTRAMUSCULAR; INTRAVENOUS EVERY 8 HOURS
Status: DISCONTINUED | OUTPATIENT
Start: 2024-01-01 | End: 2024-01-01

## 2024-01-01 RX ORDER — ONDANSETRON 2 MG/ML
4 INJECTION INTRAMUSCULAR; INTRAVENOUS EVERY 6 HOURS PRN
Status: DISCONTINUED | OUTPATIENT
Start: 2024-01-01 | End: 2024-01-01 | Stop reason: HOSPADM

## 2024-01-01 RX ORDER — NALOXONE HYDROCHLORIDE 0.4 MG/ML
0.2 INJECTION, SOLUTION INTRAMUSCULAR; INTRAVENOUS; SUBCUTANEOUS
Status: DISCONTINUED | OUTPATIENT
Start: 2024-01-01 | End: 2024-01-01 | Stop reason: HOSPADM

## 2024-01-01 RX ORDER — HALOPERIDOL 2 MG/ML
0.5 SOLUTION ORAL EVERY 6 HOURS PRN
Qty: 15 ML | Refills: 0 | Status: SHIPPED | OUTPATIENT
Start: 2024-01-01

## 2024-01-01 RX ORDER — ACETAMINOPHEN 650 MG/1
650 SUPPOSITORY RECTAL EVERY 6 HOURS PRN
Status: DISCONTINUED | OUTPATIENT
Start: 2024-01-01 | End: 2024-01-01 | Stop reason: HOSPADM

## 2024-01-01 RX ORDER — OXYCODONE HCL 20 MG/ML
2.5 CONCENTRATE, ORAL ORAL
Status: DISCONTINUED | OUTPATIENT
Start: 2024-01-01 | End: 2024-01-01 | Stop reason: HOSPADM

## 2024-01-01 RX ORDER — GLYCOPYRROLATE 0.2 MG/ML
0.2 INJECTION, SOLUTION INTRAMUSCULAR; INTRAVENOUS EVERY 4 HOURS PRN
Status: DISCONTINUED | OUTPATIENT
Start: 2024-01-01 | End: 2024-01-01 | Stop reason: HOSPADM

## 2024-01-01 RX ORDER — BISACODYL 10 MG
10 SUPPOSITORY, RECTAL RECTAL DAILY PRN
Qty: 2 SUPPOSITORY | Refills: 0 | Status: SHIPPED | OUTPATIENT
Start: 2024-01-01

## 2024-01-01 RX ORDER — ESTRADIOL 10 UG/1
10 INSERT VAGINAL
Status: ON HOLD | COMMUNITY
End: 2024-01-01

## 2024-01-01 RX ORDER — ATROPINE SULFATE 10 MG/ML
2 SOLUTION/ DROPS OPHTHALMIC
Qty: 2 ML | Refills: 0 | Status: SHIPPED | OUTPATIENT
Start: 2024-01-01

## 2024-01-01 RX ORDER — ONDANSETRON 4 MG/1
4 TABLET, ORALLY DISINTEGRATING ORAL EVERY 6 HOURS PRN
Status: DISCONTINUED | OUTPATIENT
Start: 2024-01-01 | End: 2024-01-01 | Stop reason: HOSPADM

## 2024-01-01 RX ORDER — LORAZEPAM 2 MG/ML
0.5 CONCENTRATE ORAL EVERY 4 HOURS PRN
Qty: 30 ML | Refills: 0 | Status: SHIPPED | OUTPATIENT
Start: 2024-01-01

## 2024-01-01 RX ORDER — TIMOLOL MALEATE 5 MG/ML
1 SOLUTION/ DROPS OPHTHALMIC DAILY
Status: DISCONTINUED | OUTPATIENT
Start: 2024-01-01 | End: 2024-01-01 | Stop reason: HOSPADM

## 2024-01-01 RX ORDER — KETOROLAC TROMETHAMINE 15 MG/ML
15 INJECTION, SOLUTION INTRAMUSCULAR; INTRAVENOUS EVERY 6 HOURS PRN
Status: DISCONTINUED | OUTPATIENT
Start: 2024-01-01 | End: 2024-01-01 | Stop reason: HOSPADM

## 2024-01-01 RX ORDER — CARBOXYMETHYLCELLULOSE SODIUM 5 MG/ML
1-2 SOLUTION/ DROPS OPHTHALMIC
Status: DISCONTINUED | OUTPATIENT
Start: 2024-01-01 | End: 2024-01-01 | Stop reason: HOSPADM

## 2024-01-01 RX ADMIN — METOPROLOL TARTRATE 5 MG: 5 INJECTION INTRAVENOUS at 12:18

## 2024-01-01 RX ADMIN — PANTOPRAZOLE SODIUM 20 MG: 20 TABLET, DELAYED RELEASE ORAL at 09:13

## 2024-01-01 RX ADMIN — KETOROLAC TROMETHAMINE 15 MG: 15 INJECTION, SOLUTION INTRAMUSCULAR; INTRAVENOUS at 00:32

## 2024-01-01 RX ADMIN — CEFTRIAXONE 1 G: 1 INJECTION, POWDER, FOR SOLUTION INTRAMUSCULAR; INTRAVENOUS at 11:40

## 2024-01-01 RX ADMIN — KETOTIFEN FUMARATE 1 DROP: 0.35 SOLUTION/ DROPS OPHTHALMIC at 21:12

## 2024-01-01 RX ADMIN — POTASSIUM CHLORIDE, DEXTROSE MONOHYDRATE AND SODIUM CHLORIDE: 150; 5; 450 INJECTION, SOLUTION INTRAVENOUS at 11:31

## 2024-01-01 RX ADMIN — LATANOPROST 1 DROP: 50 SOLUTION/ DROPS OPHTHALMIC at 21:12

## 2024-01-01 RX ADMIN — LATANOPROST 1 DROP: 50 SOLUTION/ DROPS OPHTHALMIC at 22:48

## 2024-01-01 RX ADMIN — TIMOLOL MALEATE 1 DROP: 5 SOLUTION/ DROPS OPHTHALMIC at 12:19

## 2024-01-01 RX ADMIN — KETOTIFEN FUMARATE 1 DROP: 0.35 SOLUTION/ DROPS OPHTHALMIC at 09:14

## 2024-01-01 RX ADMIN — TIMOLOL MALEATE 1 DROP: 5 SOLUTION/ DROPS OPHTHALMIC at 09:15

## 2024-01-01 RX ADMIN — METOPROLOL TARTRATE 5 MG: 5 INJECTION INTRAVENOUS at 04:40

## 2024-01-01 RX ADMIN — METOPROLOL TARTRATE 5 MG: 5 INJECTION INTRAVENOUS at 06:28

## 2024-01-01 RX ADMIN — METOPROLOL TARTRATE 5 MG: 5 INJECTION INTRAVENOUS at 21:06

## 2024-01-01 RX ADMIN — KETOTIFEN FUMARATE 1 DROP: 0.35 SOLUTION/ DROPS OPHTHALMIC at 12:19

## 2024-01-01 RX ADMIN — KETOTIFEN FUMARATE 1 DROP: 0.35 SOLUTION/ DROPS OPHTHALMIC at 12:12

## 2024-01-01 RX ADMIN — POTASSIUM CHLORIDE, DEXTROSE MONOHYDRATE AND SODIUM CHLORIDE: 150; 5; 450 INJECTION, SOLUTION INTRAVENOUS at 17:19

## 2024-01-01 RX ADMIN — METOPROLOL TARTRATE 5 MG: 5 INJECTION INTRAVENOUS at 18:21

## 2024-01-01 RX ADMIN — CEFTRIAXONE 1 G: 1 INJECTION, POWDER, FOR SOLUTION INTRAMUSCULAR; INTRAVENOUS at 12:18

## 2024-01-01 RX ADMIN — Medication 100 MG: at 20:13

## 2024-01-01 RX ADMIN — KETOROLAC TROMETHAMINE 15 MG: 15 INJECTION, SOLUTION INTRAMUSCULAR; INTRAVENOUS at 21:49

## 2024-01-01 RX ADMIN — TIMOLOL MALEATE 1 DROP: 5 SOLUTION/ DROPS OPHTHALMIC at 12:23

## 2024-01-01 RX ADMIN — AMPICILLIN SODIUM AND SULBACTAM SODIUM 3 G: 2; 1 INJECTION, POWDER, FOR SOLUTION INTRAMUSCULAR; INTRAVENOUS at 00:53

## 2024-01-01 RX ADMIN — METOPROLOL TARTRATE 5 MG: 5 INJECTION INTRAVENOUS at 00:30

## 2024-01-01 RX ADMIN — KETOTIFEN FUMARATE 1 DROP: 0.35 SOLUTION/ DROPS OPHTHALMIC at 20:30

## 2024-01-01 ASSESSMENT — ACTIVITIES OF DAILY LIVING (ADL)
ADLS_ACUITY_SCORE: 57
ADLS_ACUITY_SCORE: 39
ADLS_ACUITY_SCORE: 57
ADLS_ACUITY_SCORE: 51
ADLS_ACUITY_SCORE: 39
ADLS_ACUITY_SCORE: 57
ADLS_ACUITY_SCORE: 39
ADLS_ACUITY_SCORE: 57
ADLS_ACUITY_SCORE: 39
ADLS_ACUITY_SCORE: 57
ADLS_ACUITY_SCORE: 39
ADLS_ACUITY_SCORE: 57
ADLS_ACUITY_SCORE: 51
ADLS_ACUITY_SCORE: 57
ADLS_ACUITY_SCORE: 39
ADLS_ACUITY_SCORE: 57
ADLS_ACUITY_SCORE: 39
ADLS_ACUITY_SCORE: 57
ADLS_ACUITY_SCORE: 57
ADLS_ACUITY_SCORE: 59
ADLS_ACUITY_SCORE: 57
ADLS_ACUITY_SCORE: 57
ADLS_ACUITY_SCORE: 59
ADLS_ACUITY_SCORE: 39
ADLS_ACUITY_SCORE: 57
ADLS_ACUITY_SCORE: 57
ADLS_ACUITY_SCORE: 59
ADLS_ACUITY_SCORE: 57
ADLS_ACUITY_SCORE: 39
ADLS_ACUITY_SCORE: 57

## 2024-01-01 ASSESSMENT — COLUMBIA-SUICIDE SEVERITY RATING SCALE - C-SSRS
6. HAVE YOU EVER DONE ANYTHING, STARTED TO DO ANYTHING, OR PREPARED TO DO ANYTHING TO END YOUR LIFE?: NO
1. IN THE PAST MONTH, HAVE YOU WISHED YOU WERE DEAD OR WISHED YOU COULD GO TO SLEEP AND NOT WAKE UP?: NO
2. HAVE YOU ACTUALLY HAD ANY THOUGHTS OF KILLING YOURSELF IN THE PAST MONTH?: NO

## 2024-05-20 PROBLEM — J69.0 ASPIRATION PNEUMONITIS (H): Status: ACTIVE | Noted: 2024-01-01

## 2024-05-21 NOTE — ED NOTES
Per ED provider request, writer called and spoke to daughter Augustine. Gave family the option to have pt admitted and monitored for the night to see if O2 sats improve and to do IV antibiotics. Family agreed to plan, provider notified.

## 2024-05-21 NOTE — PLAN OF CARE
"ROOM # [id number]    Living Situation (if not independent, order SW consult):  Facility name:  : [facility]    Activity level at baseline: alert but confused?  Activity level on admit: somnulent    Who will be transporting you at discharge: [facility]BT    Patient confused and was given Zyprexa in ED. Somnulent upon arrival to floor. Lungs dim. In ll's shannon. anteriorly. + BS x 4 quads.Will open eyes, but, goes right back to sleep.Triggered sepsis protocol. O2  sats: 96% 0n 2 L NC.Falls band applied and bed alarm activated.D51/2 NS w/ 20meq KCL @ 75/hr.Will con't to monitor.  Problem: Adult Inpatient Plan of Care  Goal: Plan of Care Review  Description: The Plan of Care Review/Shift note should be completed every shift.  The Outcome Evaluation is a brief statement about your assessment that the patient is improving, declining, or no change.  This information will be displayed automatically on your shift  note.  Outcome: Not Progressing  Flowsheets (Taken 5/21/2024 1630)  Outcome Evaluation: Pt. received zyprexa in  ED, very sleepy on floor.VSS, O2 sats 96% on 2 L NC  Overall Patient Progress: no change  Goal: Patient-Specific Goal (Individualized)  Description: You can add care plan individualizations to a care plan. Examples of Individualization might be:  \"Parent requests to be called daily at 9am for status\", \"I have a hard time hearing out of my right ear\", or \"Do not touch me to wake me up as it startles  me\".  Outcome: Not Progressing  Goal: Absence of Hospital-Acquired Illness or Injury  Outcome: Not Progressing  Intervention: Identify and Manage Fall Risk  Recent Flowsheet Documentation  Taken 5/21/2024 1615 by Sara Riggins, RN  Safety Promotion/Fall Prevention:   activity supervised   increase visualization of patient  Intervention: Prevent Infection  Recent Flowsheet Documentation  Taken 5/21/2024 1615 by Sara Riggins, RN  Infection Prevention:   rest/sleep promoted   single patient room provided   hand " hygiene promoted  Goal: Optimal Comfort and Wellbeing  Outcome: Progressing  Goal: Readiness for Transition of Care  Outcome: Not Progressing       Discussed discharge goals and expectations with patient/family.   Pt. Here for probable aspiration pneumonia.      Goal Outcome Evaluation:           Overall Patient Progress: no changeOverall Patient Progress: no change    Outcome Evaluation: Pt. received zyprexa in  ED, very sleepy on floor.VSS, O2 sats 96% on 2 L NC

## 2024-05-21 NOTE — ED PROVIDER NOTES
"  Emergency Department Note      History of Present Illness     Chief Complaint  Choking    HPI    Delmis Quarles is a 89 year old female who presents due to choking. Patient was eating rice pudding and bread when she started actively choking. She started gurgling with stridor per EMS. EMS had suction on board and stated she \"started sounding a little better\" afterward. Her O2 saturation with 4L was about 92%. EMS are unsure what her baseline O2 is. She was also given glucagon as they were concerned she may have have a proximal esophageal food impaction. She has sounded better since glucagon application. EMS report that patient is DNR/DNI    EMS also reported that after glucagon, they thought they were giving a saline flush but inadvertently provided patient 2.5 mg of IV droperidol.    Independent Historian  EMS as detailed above.    Review of External Notes  None    Past Medical History   Medical History and Problem List  Past Medical History:   Diagnosis Date    Arthritis     Blood transfusion     Chronic gastric ulcer without mention of hemorrhage, perforation, without mention of obstruction 5/5/2006    Diffuse cystic mastopathy     Embolism and thrombosis of unspecified site 1992    Essential hypertension, benign 1972    Neuropathy     Osteoporosis, unspecified 1/00    Other lymphedema     Stricture and stenosis of esophagus 2000   Cervicalgia  Mastopathy  Hypertension  Weakness  Osteoarthritis  pneumonia    Medications  Norvasc  Lisinopril  Baby aspirin  Tessalon    Surgical History   Past Surgical History:   Procedure Laterality Date    AMPUTATE TOE(S) BILATERAL  4/23/2014    Procedure: AMPUTATE TOE(S) BILATERAL;  Surgeon: Yelitza Elise, DPM, Pod;  Location: [facility] OR    ARTHROPLASTY KNEE  2008    left    ARTHROPLASTY KNEE  1/3/2012    Procedure:ARTHROPLASTY KNEE; Right Total Knee Arthroplasty,      FUSION THORACIC LUMBAR ANTERIOR TWO LEVELS  5/10/2014    Procedure: FUSION THORACIC LUMBAR ANTERIOR TWO LEVELS;  " Surgeon: Tomás Worthy MD;  Location: [facility] OR     FLEX SIGMOIDOSCOPY W/WO NATALIE SPEC BY BRUSH/WASH  9/00    colon screen- Flex by Dr. Rosario     REMOVE TONSILS/ADENOIDS,12+ Y/O  ~1938    LIGATN/STRIP LONG & SHORT SAPHEN  2005    RFA of right vein    OPTICAL TRACKING SYSTEM FUSION POSTERIOR SPINE LUMBAR  5/10/2014    Procedure: OPTICAL TRACKING SYSTEM FUSION SPINE POSTERIOR LUMBAR ONE LEVEL;  Surgeon: Tomás Worthy MD;  Location: [facility] OR    REPAIR HAMMER TOE  4/23/2014    Procedure: REPAIR HAMMER TOE;  Surgeon: Yelitza Elise, DPM, Pod;  Location:  OR    ZZC APPENDECTOMY  1954    ZZC TOTAL ABDOM HYSTERECTOMY  1972    ROCK/BSO     Physical Exam   Patient Vitals for the past 24 hrs:   BP Temp Temp src Pulse Resp SpO2 Weight   05/20/24 2338 -- 98.4  F (36.9  C) Temporal -- -- -- --   05/20/24 2258 -- -- -- -- -- 90 % --   05/20/24 2257 -- -- -- -- -- 91 % --   05/20/24 2256 -- -- -- -- -- 91 % --   05/20/24 2255 -- -- -- -- -- 90 % --   05/20/24 2254 -- -- -- 75 -- 92 % --   05/20/24 2253 -- -- -- 74 -- 91 % --   05/20/24 2252 -- -- -- 76 -- 91 % --   05/20/24 2251 -- -- -- 76 -- 90 % --   05/20/24 2250 (!) 165/80 -- -- 75 -- 92 % --   05/20/24 2240 (!) 153/68 -- -- 70 -- 98 % --   05/20/24 2231 123/62 -- -- 66 -- 99 % --   05/20/24 2221 119/60 -- -- 66 -- 99 % --   05/20/24 2211 129/65 -- -- 70 -- 99 % --   05/20/24 2201 116/64 -- -- 71 -- 99 % --   05/20/24 2151 116/60 -- -- 68 -- 99 % --   05/20/24 2141 99/57 -- -- 66 -- 99 % --   05/20/24 2131 109/58 -- -- 68 -- 99 % --   05/20/24 2121 117/62 -- -- 73 -- 98 % --   05/20/24 2048 (!) 158/78 -- -- 93 -- 100 % --   05/20/24 2038 (!) 151/89 -- -- 81 -- 99 % --   05/20/24 2028 138/74 -- -- 87 -- 99 % --   05/20/24 2015 136/70 -- -- 93 -- 100 % --   05/20/24 1953 109/59 -- -- 91 -- 96 % --   05/20/24 1943 123/75 -- -- 96 -- 98 % --   05/20/24 1933 (!) 173/90 -- -- 112 -- 100 % --   05/20/24 1927 (!) 155/72 -- -- 108 22 95 % --   05/20/24 1917 (!) 149/92 --  -- 112 -- 100 % --   05/20/24 1916 -- -- -- -- -- -- 74.3 kg (163 lb 11.2 oz)   05/20/24 1910 (!) 139/96 -- -- 116 -- -- --     Physical Exam    General:   Elderly  female who appears mildly distressed with frequent cough  HEENT:    Oropharynx is moist, without lesions or trismus.  Eyes:    Conjunctiva normal  Neck:     Supple, no meningismus.     CV:     Regular rate and rhythm.      No murmurs, rubs or gallops.       No unilateral leg swelling.    PULM:    Faint inspiratory stridor     Coarse breath sounds throughout     Mildly distressed     Good air exchange.     No rales  ABD:    Soft, non-tender, non-distended.       No pulsatile masses.       No rebound, guarding or rigidity.  MSK:     No gross deformity to all four extremities.   LYMPH:   No cervical lymphadenopathy.  NEURO:   Alert. Good muscle tone, no atrophy.  Skin:    Warm, dry and intact.    Psych:    Mood is good and affect is appropriate.    Diagnostics   Lab Results   Labs Ordered and Resulted from Time of ED Arrival to Time of ED Departure   BASIC METABOLIC PANEL - Abnormal       Result Value    Sodium 139      Potassium 3.8      Chloride 103      Carbon Dioxide (CO2) 21 (*)     Anion Gap 15      Urea Nitrogen 32.2 (*)     Creatinine 1.10 (*)     GFR Estimate 45 (*)     Calcium 9.5      Glucose 203 (*)    CBC WITH PLATELETS AND DIFFERENTIAL    WBC Count 8.9      RBC Count 4.06      Hemoglobin 12.8      Hematocrit 40.4            MCH 31.5      MCHC 31.7      RDW 13.9      Platelet Count 218      % Neutrophils 61      % Lymphocytes 25      % Monocytes 9      % Eosinophils 3      % Basophils 1      % Immature Granulocytes 1      NRBCs per 100 WBC 0      Absolute Neutrophils 5.6      Absolute Lymphocytes 2.2      Absolute Monocytes 0.8      Absolute Eosinophils 0.2      Absolute Basophils 0.1      Absolute Immature Granulocytes 0.1      Absolute NRBCs 0.0         Imaging  XR Chest Port 1 View   Final Result   IMPRESSION: Low lung  volumes with bronchovascular crowding. Right perihilar linear opacity favoring atelectasis. No focal consolidation, significant pleural effusion or pneumothorax. No radiodense foreign object overlying the mediastinum. Normal heart    size. Left shoulder degenerative changes.          Independent Interpretation  I independently reviewed chest x-ray in which there is atelectasis versus early infiltrate to the right lower lobe  ED Course    Medications Administered  Medications   ampicillin-sulbactam (UNASYN) 3 g vial to attach to  mL bag (has no administration in time range)   ketamine (KETALAR) injection 100 mg (100 mg Intravenous $Given 5/20/24 2013)       [facility]    Procedure: Sedation    Date/Time: 5/20/2024 11:39 PM    Performed by: Camilo Larose MD  Authorized by: Camilo Larose MD    Emergent condition/consent implied    ED EVALUATION:      I have performed an Emergency Department Evaluation including taking a history and physical examination, this evaluation will be documented in the electronic medical record for this ED encounter.      ASA Class: Class 4- Severe systemic disease, acute unstable problems    Mallampati: Grade 2- soft palate, base of uvula, tonsillar pillars, and portion of posterior pharyngeal wall visible    UNIVERSAL PROTOCOL   Site Marked: NA  Prior Images Obtained and Reviewed:  NA  Required items: Required blood products, implants, devices and special equipment available    Patient identity confirmed:  Verbally with patient and arm band  Patient was reevaluated immediately before administering moderate or deep sedation or anesthesia  Confirmation Checklist:  Patient's identity using two indicators, relevant allergies, procedure was appropriate and matched the consent or emergent situation and correct equipment/implants were available  Time out: Immediately prior to the procedure a time out was called    Universal Protocol: the  Joint Commission Universal Protocol was followed      SEDATION  Patient Sedated: Yes    Sedation Type:  Deep  Sedation:  Ketamine  Vital signs: Vital signs monitored during sedation      PROCEDURE  Describe Procedure: Physician: Camilo Larose MD    Procedure: Video Laryngoscopy    Indication:  Aspiration    The Glidescope was advanced under direct visualization down to the level of the glottic structures.    Findings:  1.  There debris consistent with food including rice at the aryepiglottic folds and vallecula.  Debris/food was removed with Yankauer suction with no residual debris noted  2.   The epiglottis is normal  3.  Vocal cords are normal with no obstructing foreign body    There were no complications to the procedure.    Patient Tolerance:  Patient tolerated the procedure well with no immediate complications  Length of time physician/provider present for 1:1 monitoring during sedation: 10         Discussion of Management  Discussed plan of intervention with daughter/medical decision-maker.  She is comfortable with conscious sedation and video laryngoscopy.  They also comfortable with treatment of potential developing aspiration pneumonitis and hypoxia.    Consult Dr. Guillenlund [facility] medicine service regarding admission    Social Determinants of Health adding to complexity of care  None    ED Course  ED Course as of 05/20/24 2338   Mon May 20, 2024   1909 I obtained history and examined the patient as noted above.       Medical Decision Making / Diagnosis   CMS Diagnoses: None    MIPS     None    MDM    Delmis Quarles is a 89 year old female presents with concerns of choking and airway obstruction.  Patient was distressed upon arrival with clinical findings concerning for obstruction.  After emergent consultation with patient's daughter, they were comfortable with conscious sedation and video laryngoscopy.  Upon laryngoscopy, there was significant debris/food near the airway.  This was suctioned in its  entirety with no evidence of residual foreign body.  Patient was quite sedated afterwards from ketamine and inadvertent droperidol by EMS.  Unfortunately she developed hypoxia.  There is concern for developing aspiration pneumonitis and potentially been developing pneumonia.  Patient was given a single dose of Unasyn and will be transferred to an observation bed.    Total critical care time excluding procedures: 30 minutes    Disposition  The patient was admitted to the hospital.     ICD-10 Codes:    ICD-10-CM    1. Aspiration pneumonitis (H)  J69.0       2. Airway obstruction  J98.8            Discharge Medications  New Prescriptions    No medications on file         Scribe Disclosure:  Nuvia HERNANDEZ, am serving as a scribe at 7:16 PM on 5/20/2024 to document services personally performed by Camilo Larose MD, based on my observations and the provider's statements to me.        Camilo Larose MD  05/20/24 [id number]

## 2024-05-21 NOTE — ED NOTES
ED MD spoke with family and agreed upon a plan to give patient sedation and utilize a glidescope for visualization and suctioning of airway. RT was paged and came to the room along with MD and Sandip Harding RN for ketamine administration. 100mg of ketamine was administered at 1926 and patient tolerated suctioning well.

## 2024-05-21 NOTE — ED NOTES
Patient removed from NC per provider request, O2 sats currently between 90-92. Provider notified.

## 2024-05-21 NOTE — PLAN OF CARE
[facility]    ED Boarding Nurse Handoff Addendum Report:    Date/time: 5/21/2024, 5:59 AM    Activity Level: in bed    Fall Risk: Yes:  nonskid shoes/slippers when out of bed, arm band in place, assistive device/personal items within reach, and room door open    Active Infusions: None    Current Meds Due: See Mar    Current care needs: See orders    Oxygen requirements (liters/min and/or FiO2): 2L    Respiratory status: Nasal cannula    Vital signs (within last 30 minutes):    Vitals:    05/20/24 2344 05/20/24 2349 05/21/24 0145 05/21/24 0423   BP: (!) 185/99 (!) 186/90 (!) 177/86 (!) 146/76   BP Location:    Right arm   Pulse: 86 86 83    Resp:   20    Temp:    98  F (36.7  C)   TempSrc:    Axillary   SpO2: 99% 97% 98%    Weight:           Focused assessment within last 30 minutes:    Disoriented x4, confused, no non-verbal indications of pain observed, frequent cough, incontinent cares completed. Currently on 1LNC    ED Boarding Nurse name: Lanie Powers RN

## 2024-05-21 NOTE — PROGRESS NOTES
Care Management Follow Up    Length of Stay (days): 0    Expected Discharge Date: 05/21/2024         Additional Information:  Verified with [facility] that patient has a bed hold in LTC. Per review, patient potentially will discharge with comfort measures if patient is not improving.     ARELI Burgos, [facility]  Emergency Room   Please contact the SW on the floor in which the patient is staying for any questions or concerns

## 2024-05-21 NOTE — H&P
[facility]  Hospitalist Admission Note  Name: Delmis Quarles    MRN: [id number]  YOB: 1924    Age: 89 year old  Date of admission: 5/20/2024  Primary care provider: Sherry Monteiro    Chief Complaint:  cough, choking    Assessment and Plan:   Acute hypoxemic respiratory failure  Aspiration pneumonitis: Brought in from care facility due to choking after eating rice pudding and bread on her way back to her room.  Per EMS she had gurgling and stridor and she was suctioned and route.  O2 sats 92% on 4 L.  Given glucagon due to concern for proximal esophageal food impaction.  Also reportedly she was supposed to receive a normal saline flush by EMS, but actually received 2.5 mg of IV droperidol inadvertently.  In the ER she is currently on 1 L O2 and O2 sats are 96% which reviewing baseline vitals this past week from her facility paperwork she is usually at 95-97%.  Chest x-ray shows possible right perihilar opacity although this is subtle.  Afebrile and no leukocytosis.  Suspect she may have some aspiration pneumonitis.  At baseline she is on a regular diet.  After discussion with family Dr. Larose did perform GlideScope visualization in the ER where she was found to have some rice pudding around the epiglottic folds that was suctioned out.  She is now somnolent after receiving propofol for conscious sedation and cannot provide any history.  He does have history of esophageal stricture requiring dilation 25 years ago.  I suspect she may have some aspiration pneumonitis, but certainly does not have pneumonia at this point.  -Reportedly received IV Unasyn in the ER although she does have a penicillin allergy.  For now would not continue antibiotics, but could consider short course of oral antibiotics on discharge to prevent aspiration pneumonia given her advanced age and limited medical care plan  -Monitor on continuous pulse ox and wean oxygen as able  -I have kept her n.p.o. for now  she is quite somnolent and I recommend a formal SLP evaluation in the morning when she is awake  -If she does poorly with SLP or has recurrent episodes, she did have esophageal stricture many years ago requiring dilation and GI consult could be considered if needed    Dementia: Lives in a care facility, I am not sure if this is memory care.  Has dementia per medical history listed on her facility paperwork.  -Fall precautions    HTN: Resume her PTA amlodipine 2.5 mg twice daily.  Baseline blood pressure runs around 120-170 systolic per her facility paperwork that includes recent vitals.  In the past has been on lisinopril.    History gastric ulcer: Resume her daily omeprazole.    Glaucoma: If not discharging during the day then she would need her timolol and Xalatan drops ordered.    CKD stage IIIa: Creatinine at baseline of 1-1.1    Heel pressure ulcers  History of DM2: Issues with heel pressure ulcers and has history of diabetes and neuropathy per care facility paperwork.  All A1c's I can see on file have been in the 5 range and she is not a treatment for diabetes.  Glucose elevated at 203 in the ER, but this is after receiving glucagon.      DVT Prophylaxis: Low Risk/Ambulatory with no VTE prophylaxis indicated  Code Status: DNR / DNI -reviewed POLST and Dr. Larose confirmed with family prior to giving conscious sedation  FEN: N.p.o. until seen by SLP  Discharge Dispo: Back to care facility, consult SW to facilitate  Estimated Disch Date / # of Days until Disch: Admit observation for mild hypoxia and somnolence.  Anticipate discharge later today if she is more alert, passes SLP dysphagia screen, and weans from oxygen.      History of Present Illness:  Delmis Quarles is a 89 year old female with PMH including dementia, HTN, CKD stage IIIa, DM 2, neuropathy, foot ulcers, PUD, anemia, glaucoma, and pneumonia who presents via EMS from her care facility for choking episodes after eating.  Patient is somnolent and  cannot provide any history for me.  Per report she was eating rice pudding and bread earlier and then on the way back to her room she started coughing and what seems like choking.  For EMS she had some gurgling with stridor.  They did suction her and she sounded like her breathing was a little bit better.  O2 sats were 92% on 4 L.  EMS was concern for food impaction so she received glucagon.  She was also to be given a normal saline flush, but inadvertently received 2.5 mg IV droperidol and route.  Per her paperwork from her care facility she is on a regular diet at baseline.    History obtained from patient, medical record, and from Dr. Larose in the emergency department.  Initial blood pressure 109/59 that has been increasing since arrival, heart rate 91, temperature not recorded, oxygen 96% on 4 L now weaned down to 1 L.  Initial labs showed WBC 8.9, hemoglobin 12.8, platelet count 218.  Glucose is 203.  Creatinine 1.1 which is her baseline.  Bicarb slightly low at 21.  Chest x-ray shows possible right perihilar opacity, but this is quite subtle.  After discussion with family Dr. Larose performed GlideScope visualization and she was found to have some rice pudding around her epiglottic folds which was suctioned out.  She did receive 100 mg ketamine for that and she is somnolent now.  IV Unasyn was ordered although I see a penicillin allergy.  Observation bed requested until she weans from oxygen is more awake.       Clinically Significant Risk Factors Present on Admission                # Drug Induced Platelet Defect: home medication list includes an antiplatelet medication   # Hypertension: Noted on problem list                           Past Medical History reviewed:  Past Medical History:   Diagnosis Date    Arthritis     Blood transfusion     Chronic gastric ulcer without mention of hemorrhage, perforation, without mention of obstruction 5/5/2006    EGD, NSAID induced; PPI indefinitely,     Diffuse cystic  mastopathy     Embolism and thrombosis of unspecified site     Post phlebitic syndrome; Jobst stocking    Essential hypertension, benign 1972    Neuropathy     FEET AND HANDS    Osteoporosis, unspecified     Dexa done in [place], [place]; Fosamax started     Other lymphedema     Stricture and stenosis of esophagus 2000    dilitation      Past Surgical History reviewed:  Past Surgical History:   Procedure Laterality Date    AMPUTATE TOE(S) BILATERAL  2014    Procedure: AMPUTATE TOE(S) BILATERAL;  Surgeon: Yelitza Elise DPM, Pod;  Location: [facility] OR    ARTHROPLASTY KNEE      left    ARTHROPLASTY KNEE  1/3/2012    Procedure:ARTHROPLASTY KNEE; Right Total Knee Arthroplasty,      FUSION THORACIC LUMBAR ANTERIOR TWO LEVELS  5/10/2014    Procedure: FUSION THORACIC LUMBAR ANTERIOR TWO LEVELS;  Surgeon: Tomás Worthy MD;  Location: [facility] OR     FLEX SIGMOIDOSCOPY W/WO NATALIE SPEC BY BRUSH/WASH      colon screen- Flex by Dr. Rosario     REMOVE TONSILS/ADENOIDS,12+ Y/O  ~193    LIGATN/STRIP LONG & SHORT SAPHEN      RFA of right vein    OPTICAL TRACKING SYSTEM FUSION POSTERIOR SPINE LUMBAR  5/10/2014    Procedure: OPTICAL TRACKING SYSTEM FUSION SPINE POSTERIOR LUMBAR ONE LEVEL;  Surgeon: Tomás Worthy MD;  Location: [facility] OR    REPAIR HAMMER TOE  2014    Procedure: REPAIR HAMMER TOE;  Surgeon: Yelitza Elise DPM, Pod;  Location:  OR    [facility] APPENDECTOMY      Z TOTAL ABDOM HYSTERECTOMY      ROCK/BSO     Social History reviewed:  Social History     Tobacco Use    Smoking status: Never    Smokeless tobacco: Never   Substance Use Topics    Alcohol use: No     Alcohol/week: 0.0 standard drinks of alcohol     Social History     Social History Narrative    Not on file     Family History reviewed:  Family History   Problem Relation Age of Onset    C.A.D. Brother          at 67    C.A.D. Father         CHF    C.A.D. Maternal Uncle     C.A.D. Maternal Uncle     Blood Disease Brother          type of Leukemia     Allergies:  Allergies   Allergen Reactions    Trospium Swelling     Lower extremity edema    Morphine And Codeine      dry mouth and nausea    Penicillins      dry mouth    Sulfa Antibiotics      dry mouth     Medications:  Prior to Admission medications    Medication Sig Last Dose Taking? Auth Provider Long Term End Date   acetaminophen (TYLENOL) 325 MG tablet Take 2 tablets (650 mg) by mouth every 4 hours as needed for mild pain   Donte Palma DO     amLODIPine (NORVASC) 5 MG tablet    Reported, Patient Yes    aspirin 81 MG EC tablet Take 1 tablet (81 mg) by mouth daily  Patient taking differently: Take 81 mg by mouth every evening    Rhonda Aj MD     benzonatate (TESSALON) 100 MG capsule Take 1 capsule (100 mg) by mouth 3 times daily as needed for cough   Joon Erazo MD     Calcium Carb-Cholecalciferol (CALCIUM CARBONATE-VITAMIN D3) 600-400 MG-UNIT TABS    Reported, Patient     Calcium Carbonate-Vitamin D (CALCIUM + D) 600-200 MG-UNIT per tablet Take 1 tablet by mouth 2 times daily    Reported, Patient     diclofenac (VOLTAREN) 1 % topical gel    Reported, Patient     estradiol (VAGIFEM) 10 MCG TABS vaginal tablet INSERT 1 TABLET VAGINALLY TWICE WEEKLY  Patient taking differently: INSERT 1 TABLET VAGINALLY TWICE WEEKLY Tues/Fri   Rhonda Aj MD     fexofenadine (ALLEGRA) 180 MG tablet Take 180 mg by mouth daily as needed for allergies   Unknown, Entered By History     fluticasone (FLONASE) 50 MCG/ACT spray INSTILL 2 SPRAYS INTO BOTH NOSTRILS ONCE DAILY   Rhonda Aj MD     furosemide (LASIX) 40 MG tablet Take 1 tablet (40 mg) by mouth daily   Donte Palma DO Yes    gabapentin (NEURONTIN) 100 MG capsule 200 mg in the morning and 100 mg at noon  200 mg in the evening   Rhonda Aj MD Yes    guaiFENesin (MUCINEX) 600 MG 12 hr tablet Take 1 tablet (600 mg) by mouth 2 times daily as needed for congestion   Joon Erazo  MD Fran     guaiFENesin-dextromethorphan (ROBITUSSIN DM) 100-10 MG/5ML syrup Take 5 mLs by mouth every 4 hours as needed for cough   Donte Palma, DO     latanoprost (XALATAN) 0.005 % ophthalmic solution Place 1 drop into both eyes At Bedtime    Reported, Patient     lisinopril (PRINIVIL/ZESTRIL) 20 MG tablet Take 1 tablet (20 mg) by mouth 2 times daily   Donte Palma, DO Yes    lisinopril (ZESTRIL) 30 MG tablet    Reported, Patient Yes    multivitamin, therapeutic with minerals (MULTI-VITAMIN) TABS tablet Take 1 tablet by mouth daily   Unknown, Entered By History     omeprazole (PRILOSEC) 10 MG DR capsule    Reported, Patient Yes    omeprazole (PRILOSEC) 20 MG CR capsule TAKE 1 CAPSULE EVERY MORNING DAILY   Rhonda Aj MD Yes    potassium chloride ER (K-TAB/KLOR-CON) 10 MEQ CR tablet    Reported, Patient     timolol (TIMOPTIC) 0.5 % ophthalmic solution Place 1 drop into both eyes daily   Travis Calixto MD     vitamin B-Complex Take 1 tablet by mouth daily   Shaista Hassan PA-C     Vitamin D, Cholecalciferol, 1000 units TABS Take 2,000 Units by mouth daily   Unknown, Entered By History       Review of Systems:  A Comprehensive greater than 10 system review of systems was carried out.  Pertinent positives and negatives are noted above.  Otherwise negative.     Physical Exam:  Blood pressure (!) 165/80, pulse 75, resp. rate 22, weight 74.3 kg (163 lb 11.2 oz), SpO2 90%, not currently breastfeeding.  Wt Readings from Last 1 Encounters:   05/20/24 74.3 kg (163 lb 11.2 oz)     Exam:  Constitutional: Asleep, NAD  HEENT: MMM  Respiratory: I do not appreciate any focal crackles or wheeze, she appears comfortable on 1 L via nasal cannula  Cardiovascular: RRR.  No murmur appreciated  GI: non-tender, not distended, bowel sounds present  Musculoskeletal/extremities: Trace-1+ bilateral lower extremity edema  Neurologic: Asleep, arouses to stimulation, but not answering  questions  Psychiatric: calm     Lab and imaging data personally reviewed:  Labs:  Recent Labs   Lab 05/20/24 1926   WBC 8.9   HGB 12.8   HCT 40.4           Recent Labs   Lab 05/20/24 1926      POTASSIUM 3.8   CHLORIDE 103   CO2 21*   ANIONGAP 15   *   BUN 32.2*   CR 1.10*   GFRESTIMATED 45*   MAKI 9.5       Imaging:  Recent Results (from the past 24 hour(s))   XR Chest Port 1 View    Narrative    EXAM: XR CHEST PORT 1 VIEW  LOCATION: [facility]  DATE: 5/20/2024    INDICATION: Choking.  COMPARISON: Chest radiograph 12/3/2018      Impression    IMPRESSION: Low lung volumes with bronchovascular crowding. Right perihilar linear opacity favoring atelectasis. No focal consolidation, significant pleural effusion or pneumothorax. No radiodense foreign object overlying the mediastinum. Normal heart   size. Left shoulder degenerative changes.       Jamil Auguste MD  Hospitalist  [facility]

## 2024-05-21 NOTE — PROGRESS NOTES
[facility]    Hospitalist Progress Note      Assessment & Plan   Arallanjamari Quarles is a 89 year old female with PMH including dementia, HTN, CKD stage IIIa, DM 2, neuropathy, foot ulcers, PUD, anemia, glaucoma, and pneumonia who presents via EMS from her care facility for choking episodes after eating.     #Acute hypoxemic respiratory failure.  Aspiration pneumonitis.  Known esophageal stricture s/p dilation decades ago: Brought in from care facility due to choking after eating rice pudding and bread on her way back to her room.  Per EMS she had gurgling and stridor and she was suctioned and route.  O2 sats 92% on 4 L.  Given glucagon due to concern for proximal esophageal food impaction.  Also reportedly she was supposed to receive a normal saline flush by EMS, but actually received 2.5 mg of IV droperidol inadvertently.    -In the ER she was on 1 L O2 and O2 sats are 96% which reviewing baseline vitals this past week from her facility paperwork she is usually at 95-97%.  Chest x-ray shows possible right perihilar opacity although this is subtle.  Afebrile and no leukocytosis.  Suspect she may have some aspiration pneumonitis.  At baseline she is on a regular diet.    -After discussion with family Dr. Larose did perform GlideScope visualization in the ER where she was found to have some rice pudding around the epiglottic folds that was suctioned out.  She was then somnolent after receiving propofol for conscious sedation and cannot provide any history.    -Reportedly received IV Unasyn in the ER although she does have a penicillin allergy.   -On 5/21, patient awake but very confused.  She is alert to self only.  She thinks she is at the dentist.  She is still a bit somnolent.  Speech evaluated the patient and at this point only okay for full liquid diet with slow pace and fully upright.  -I discussed with patient's daughter, Denise by phone.  We will wait for the medications given for  sedation to wear off a bit longer.  Suspect in this patient who is 89 this may take another 12 to 24 hours.  Hopefully as these meds wear off, her bowel function will improve.  -Denise does tell me that her mother is comfort cares which is consistent with her POLST form.  She is okay with IV fluids and antibiotics but no escalation of care or invasive procedures.  She would not be interested in any sort of EGD with dilation.  -Hope is that patient will be able to discharge back to her facility tomorrow even potentially with comfort measures if patient is not showing any improvement.  Discussed with patient's daughter, Denise.    #Dementia: Was in assisted living.  Has a private room which daughter is concerned patient may lose if she has prolonged hospitalization.  She is able to have a conversation but memory is poor per daughter.  She is clearly not at baseline as of yet.    #Glaucoma: Continue her eyedrops.  #CKD2: Creatinine at baseline.  Monitor.  #HTN: Low-dose amlodipine.    DVT Prophylaxis: Pneumatic Compression Devices  Code Status: No CPR- Do NOT Intubate  Dispo: Hopeful the patient can discharge back to her facility tomorrow with mental status getting closer to baseline.    Murtaza Hickman MD    Interval History   Assumed care.  No events.  Patient is awake but not alert.  She is only alert to self.  She is still a bit somnolent.  She denies pain.  Discussed at length with patient's daughter, Denise by phone.  She is coming in the afternoon to see her mom.    -Data reviewed today: I reviewed all new labs and imaging results over the last 24 hours. I personally reviewed   Physical Exam   Temp: 99.8  F (37.7  C) Temp src: Axillary BP: (!) 159/100 Pulse: 102   Resp: 20 SpO2: 97 % O2 Device: None (Room air) Oxygen Delivery: 1 LPM  Vitals:    05/20/24 1916   Weight: 74.3 kg (163 lb 11.2 oz)     Vital Signs with Ranges  Temp:  [98  F (36.7  C)-99.8  F (37.7  C)] 99.8  F (37.7  C)  Pulse:  [] 102  Resp:  [20-22]  20  BP: ()/() 159/100  SpO2:  [90 %-100 %] 97 %  No intake/output data recorded.    Constitutional: Elderly, not in acute distress.  Appears comfortable.  HEENT: Normocephalic. MMM, No elevation of JVD noted.   Respiratory: Nl WOB, she has some inspiratory crackles more at the left base.  No wheezes.  Cardiovascular: Regular, no murmur  GI: BS+, NT, ND  Skin/Integumen: WWP, no rash. No edema  Neuro: She is awake.  She is able to tell me her name.  She thinks she is at the dentist.  She is a bit somnolent but seems to track and is directable.    Medications   Current Facility-Administered Medications   Medication Dose Route Frequency Provider Last Rate Last Admin     Current Facility-Administered Medications   Medication Dose Route Frequency Provider Last Rate Last Admin    amLODIPine (NORVASC) tablet 2.5 mg  2.5 mg Oral BID Jamil Auguste MD        ketotifen fumarate 0.035% ophthalmic solution 1 drop  1 drop Both Eyes BID Murtaza Hickman MD        latanoprost (XALATAN) 0.005 % ophthalmic solution 1 drop  1 drop Both Eyes At Bedtime Murtaza Hickman MD        pantoprazole (PROTONIX) EC tablet 20 mg  20 mg Oral QAM AC Jamil Auguste MD   20 mg at 05/21/24 0913    timolol maleate (TIMOPTIC) 0.5 % ophthalmic solution 1 drop  1 drop Both Eyes Daily Murtaza Hickman MD           Data   Recent Labs   Lab 05/20/24  1926   WBC 8.9   HGB 12.8            POTASSIUM 3.8   CHLORIDE 103   CO2 21*   BUN 32.2*   CR 1.10*   ANIONGAP 15   MAKI 9.5   *       Recent Results (from the past 24 hour(s))   XR Chest Port 1 View    Narrative    EXAM: XR CHEST PORT 1 VIEW  LOCATION: [facility]  DATE: 5/20/2024    INDICATION: Choking.  COMPARISON: Chest radiograph 12/3/2018      Impression    IMPRESSION: Low lung volumes with bronchovascular crowding. Right perihilar linear opacity favoring atelectasis. No focal consolidation, significant pleural effusion or pneumothorax. No  radiodense foreign object overlying the mediastinum. Normal heart   size. Left shoulder degenerative changes.        Quality 130: Documentation Of Current Medications In The Medical Record: Current Medications Documented Detail Level: Detailed

## 2024-05-21 NOTE — PLAN OF CARE
"Goal Outcome Evaluation:      Plan of Care Reviewed With: patient, child    Overall Patient Progress: improvingOverall Patient Progress: improving    Outcome Evaluation: ST following for diet advancement, monitor mental status    PRIMARY DIAGNOSIS: GENERALIZED WEAKNESS    OUTPATIENT/OBSERVATION GOALS TO BE MET BEFORE DISCHARGE  1. Orthostatic performed: N/A    2. Tolerating PO medications: Yes    3. Return to near baseline physical activity: No    4. Cleared for discharge by consultants (if involved): No    Discharge Planner Nurse   Safe discharge environment identified: Yes  Barriers to discharge: Yes       Entered by: Janeth Fonseca RN 05/21/2024 9:19 AM     Please review provider order for any additional goals.   Nurse to notify provider when observation goals have been met and patient is ready for discharge.                                                      Problem: Adult Inpatient Plan of Care  Goal: Plan of Care Review  Description: The Plan of Care Review/Shift note should be completed every shift.  The Outcome Evaluation is a brief statement about your assessment that the patient is improving, declining, or no change.  This information will be displayed automatically on your shift  note.  Outcome: Progressing  Flowsheets (Taken 5/21/2024 0918)  Outcome Evaluation: ST following for diet advancement, monitor mental status  Plan of Care Reviewed With:   patient   child  Overall Patient Progress: improving  Goal: Patient-Specific Goal (Individualized)  Description: You can add care plan individualizations to a care plan. Examples of Individualization might be:  \"Parent requests to be called daily at 9am for status\", \"I have a hard time hearing out of my right ear\", or \"Do not touch me to wake me up as it startles  me\".  Outcome: Progressing  Goal: Absence of Hospital-Acquired Illness or Injury  Outcome: Progressing  Intervention: Identify and Manage Fall Risk  Recent Flowsheet Documentation  Taken " 5/21/2024 0729 by Janeth Fonseca, RN  Safety Promotion/Fall Prevention:   activity supervised   clutter free environment maintained   nonskid shoes/slippers when out of bed   patient and family education   safety round/check completed  Intervention: Prevent and Manage VTE (Venous Thromboembolism) Risk  Recent Flowsheet Documentation  Taken 5/21/2024 0729 by Janeth Fonseca, RN  VTE Prevention/Management: compression stockings on  Intervention: Prevent Infection  Recent Flowsheet Documentation  Taken 5/21/2024 0729 by Janeth Fonseca, RN  Infection Prevention:   rest/sleep promoted   single patient room provided   hand hygiene promoted  Goal: Optimal Comfort and Wellbeing  Outcome: Progressing  Goal: Readiness for Transition of Care  Outcome: Progressing

## 2024-05-21 NOTE — ED NOTES
"[facility]  ED Nurse Handoff Report    ED Chief complaint: Choking  . ED Diagnosis:   Final diagnoses:   Aspiration pneumonitis (H)   Airway obstruction       Allergies:   Allergies   Allergen Reactions    Trospium Swelling     Lower extremity edema    Morphine And Codeine      dry mouth and nausea    Penicillins      dry mouth    Sulfa Antibiotics      dry mouth       Code Status: DNR / DNI    Activity level - Baseline/Home:  assist of 2.  Activity Level - Current:   assist of 2.   Lift room needed: No.   Bariatric: No   Needed: No   Isolation: No.   Infection: Not Applicable.     Respiratory status: Nasal cannula 2L    Vital Signs (within 30 minutes):   Vitals:    05/20/24 2338 05/20/24 2339 05/20/24 2344 05/20/24 2349   BP:  (!) 185/93 (!) 185/99 (!) 186/90   Pulse:  84 86 86   Resp:       Temp: 98.4  F (36.9  C)      TempSrc: Temporal      SpO2:  94% 99% 97%   Weight:           Cardiac Rhythm:  ,      Pain level:    Patient confused: Yes.   Patient Falls Risk: arm band in place, patient and family education, and activity supervised.   Elimination Status:  Has not voided      Patient Report - Initial Complaint: Choking.   Focused Assessment:     Delmis Quarles is a 89 year old female who presents due to choking. Patient was eating rice pudding and bread when she started actively choking. She started gurgling with stridor per EMS. EMS had suction on board and stated she \"started sounding a little better\" afterward. Her O2 saturation with 4L was about 92%. EMS are unsure what her baseline O2 is. She was also given glucagon as they were concerned she may have have a proximal esophageal food impaction. She has sounded better since glucagon application. EMS report that patient is DNR/DNI     EMS also reported that after glucagon, they thought they were giving a saline flush but inadvertently provided patient 2.5 mg of IV droperidol.     Abnormal Results:   Labs Ordered and Resulted " from Time of ED Arrival to Time of ED Departure   BASIC METABOLIC PANEL - Abnormal       Result Value    Sodium 139      Potassium 3.8      Chloride 103      Carbon Dioxide (CO2) 21 (*)     Anion Gap 15      Urea Nitrogen 32.2 (*)     Creatinine 1.10 (*)     GFR Estimate 45 (*)     Calcium 9.5      Glucose 203 (*)    CBC WITH PLATELETS AND DIFFERENTIAL    WBC Count 8.9      RBC Count 4.06      Hemoglobin 12.8      Hematocrit 40.4            MCH 31.5      MCHC 31.7      RDW 13.9      Platelet Count 218      % Neutrophils 61      % Lymphocytes 25      % Monocytes 9      % Eosinophils 3      % Basophils 1      % Immature Granulocytes 1      NRBCs per 100 WBC 0      Absolute Neutrophils 5.6      Absolute Lymphocytes 2.2      Absolute Monocytes 0.8      Absolute Eosinophils 0.2      Absolute Basophils 0.1      Absolute Immature Granulocytes 0.1      Absolute NRBCs 0.0          XR Chest Port 1 View   Final Result   IMPRESSION: Low lung volumes with bronchovascular crowding. Right perihilar linear opacity favoring atelectasis. No focal consolidation, significant pleural effusion or pneumothorax. No radiodense foreign object overlying the mediastinum. Normal heart    size. Left shoulder degenerative changes.          Treatments provided: See MAR  Family Comments: Daughter were at bedside, have since left.   OBS brochure/video discussed/provided to patient:  Yes  ED Medications:   Medications   ampicillin-sulbactam (UNASYN) 3 g vial to attach to  mL bag (has no administration in time range)   ketamine (KETALAR) injection 100 mg (100 mg Intravenous $Given 5/20/24 2013)       Drips infusing:  No  For the majority of the shift this patient was Green.   Interventions performed were N/A.    Sepsis treatment initiated: No    Cares/treatment/interventions/medications to be completed following ED care: Follow admission care plan.     ED Nurse Name: Sepideh Romero RN  11:55 PM

## 2024-05-21 NOTE — PROGRESS NOTES
[facility]   Inpatient Clinical Swallow Evaluation    05/21/24 0893   Appointment Info   Signing Clinician's Name / Credentials (SLP) Jen Peña MS CCC SLP   General Information   Onset of Illness/Injury or Date of Surgery 05/20/24   Referring Physician Jamil Auguste MD   Patient/Family Therapy Goal Statement (SLP) None stated   Pertinent History of Current Problem Per provider documentation - Delmis Quarles is a 89 year old female with PMH including dementia, HTN, CKD stage IIIa, DM 2, neuropathy, foot ulcers, PUD, anemia, glaucoma, and pneumonia who presents via EMS from her care facility for choking episodes after eating.  Patient is somnolent and cannot provide any history for me.  Per report she was eating rice pudding and bread earlier and then on the way back to her room she started coughing and what seems like choking.  For EMS she had some gurgling with stridor.  They did suction her and she sounded like her breathing was a little bit better.  O2 sats were 92% on 4 L.  EMS was concern for food impaction so she received glucagon.  She was also to be given a normal saline flush, but inadvertently received 2.5 mg IV droperidol and route.  Per her paperwork from her care facility she is on a regular diet at baseline.   General Observations Pt is alert and agreeable, no family present   Type of Evaluation   Type of Evaluation Swallow Evaluation   Oral Motor   Oral Musculature generally intact   Structural Abnormalities none present   Mucosal Quality adequate   Dentition (Oral Motor)   Dentition (Oral Motor) some missing teeth   Vocal Quality/Secretion Management (Oral Motor)   Vocal Quality (Oral Motor) hoarse;harsh   Secretion Management (Oral Motor) WNL   General Swallowing Observations   Past History of Dysphagia No apparent oropharyngeal hx.   Respiratory Support room air   Current Diet/Method of Nutritional Intake (General Swallowing Observations, NIS) NPO   Swallowing Evaluation  Clinical swallow evaluation   Clinical Swallow Evaluation   Feeding Assistance dependent   Clinical Swallow Evaluation Textures Trialed thin liquids;pureed   Clinical Swallow Eval: Thin Liquid Texture Trial   Mode of Presentation, Thin Liquids spoon;straw;fed by clinician   Volume of Liquid or Food Presented 3 oz   Oral Phase of Swallow WFL   Pharyngeal Phase of Swallow intact   Diagnostic Statement No overt s/sx of aspiration   Clinical Swallow Evaluation: Puree Solid Texture Trial   Mode of Presentation, Puree spoon;fed by clinician   Volume of Puree Presented 3.5 oz   Oral Phase, Puree WFL   Pharyngeal Phase, Puree repeated swallows;intact   Diagnostic Statement Towards the end of the 4 oz, pt presented with cough x 2. When asked she reported feeling the sensation of being full after only a small quantity.   Esophageal Phase of Swallow   Patient reports or presents with symptoms of esophageal dysphagia Yes   Esophageal comments Hx of esophageal stricture with dilation approx 25 years ago.   Swallowing Recommendations   Diet Consistency Recommendations full liquid diet;thin liquids (level 0)   Supervision Level for Intake 1:1 supervision needed   Mode of Delivery Recommendations bolus size, small;slow rate of intake   Swallowing Maneuver Recommendations alternate food and liquid intake;extra swallow   Monitoring/Assistance Required (Eating/Swallowing) cue for finger/lingual sweep if oral pocketing present;stop eating activities when fatigue is present;monitor for cough or change in vocal quality with intake   Recommended Feeding/Eating Techniques (Swallow Eval) maintain upright sitting position for eating;maintain upright posture during/after eating for 30 minutes;minimize distractions during oral intake;provide assist with feeding;provide 6 smaller meals throughout day   Medication Administration Recommendations, Swallowing (SLP) Crushed in puree   Instrumental Assessment Recommendations   (May need imaging  pending ongoing clinical presentation. Likely would benefit from GI work up as well and/or discussion of goals of care)   General Therapy Interventions   Planned Therapy Interventions Dysphagia Treatment   Dysphagia treatment Instruction of safe swallow strategies;Modified diet education   Clinical Impression   Criteria for Skilled Therapeutic Interventions Met (SLP Eval) Yes, treatment indicated   SLP Diagnosis Oropharyngeal and likely esophageal dysfunction   Risks & Benefits of therapy have been explained evaluation/treatment results reviewed;care plan/treatment goals reviewed;participants voiced agreement with care plan;participants included;patient   Clinical Impression Comments Pt seen for clinical swallow evaluation. Pt's vocal quality is hoarse but she reports improving, some missing teeth and general weakness, otherwise functional OM exam. She reports difficulty swallowing and only recalls it ever being related to rice. She consumed thin liquids and purees without immediate, overt s/sx of aspiration. After approx 3.5 oz of puree she had a cough x 2 and when asked reported the feeling of fullness so PO trials were discontinued.     Oropharyngeal and likely esophageal dysfunction. Recommend a full liquid diet (thin liquids) with 1:1 supervision/assistance. Pt must be completely upright, be fed at a slow rate, and remain sitting upright for at least 60 mins following PO intake. Will continue to follow and determine need for VFSS, do recommend GI work up as well and/or discussion regarding goals of care. Initiate SLP services for dysphagia.   SLP Total Evaluation Time   Eval: oral/pharyngeal swallow function, clinical swallow Minutes (04545) 20                                                                                      [facility]      OUTPATIENT SPEECH LANGUAGE PATHOLOGY EVALUATION  PLAN OF TREATMENT FOR OUTPATIENT REHABILITATION  (COMPLETE FOR INITIAL CLAIMS  ONLY)  Patient's Last Name, First Name, M.I.  YOB: 1924  WillamDelmis  ROSLYN                        Provider's Name  [facility] Medical Record No.  [id number]                               Onset Date:  05/20/24  Start of Care Date:   5/21/24   Type:     ___PT   ___OT   _X_SLP Medical Diagnosis:       Dysphagia     SLP Diagnosis:  Oropharyngeal and likely esophageal dysfunction  Visits from SOC:  1   ________________________________________________________________  Plan of Treatment/Functional Goals    Planned Interventions:   Dysphagia Treatment       Instruction of safe swallow strategies, Modified diet education        Goals: See Speech Language Pathology Goals on Care Plan in [facility] electronic health record.    Therapy Frequency:5 times/week   Predicted Duration of Therapy Intervention: 06/18/24  ________________________________________________________________________________    I CERTIFY THE NEED FOR THESE SERVICES FURNISHED UNDER        THIS PLAN OF TREATMENT AND WHILE UNDER MY CARE .             Physician Signature               Date    X_____________________________________________________                         Referring Physician: Jamil Auguste MD           Initial Assessment        See Speech Language Pathology documentation in Epic electronic health record, evaluation dated

## 2024-05-21 NOTE — PHARMACY-ADMISSION MEDICATION HISTORY
Pharmacist Admission Medication History    Admission medication history is complete. The information provided in this note is only as accurate as the sources available at the time of the update.    Information Source(s): Facility ([facility]/) medication list/[facility]    Pertinent Information: None    Changes made to PTA medication list:  Added: Miralax, Senna-docusate, Tylenol  Deleted: Aspirin, Benzonatate, Calcium, Fexofenadine, Furosemide, Gabapentin, Guaifenesin, Lisinopril, Omeprazole, Potassium  Changed: None    Allergies reviewed with patient and updates made in EHR: no    Medication History Completed By: Dev Day [facility] 5/21/2024 8:47 AM    PTA Med List   Medication Sig Last Dose    acetaminophen (TYLENOL) 500 MG tablet Take 1,000 mg by mouth 3 times daily 5/20/2024 at 1703    amLODIPine (NORVASC) 2.5 MG tablet Take 2.5 mg by mouth 2 times daily Give only if systolic BP > 160 5/20/2024 at 0732    diclofenac (VOLTAREN) 1 % topical gel Apply 2-4 g topically 2 times daily Apply 4g to knees.  Apply 2g to arms/shoulder. 5/20/2024 at 1656    estradiol (VAGIFEM) 10 MCG TABS vaginal tablet Place 10 mcg vaginally twice a week (Tuesdays and Fridays) 5/17/2024 at 2016    ketotifen fumarate 0.035% 0.035 % SOLN ophthalmic solution Place 1 drop into both eyes 2 times daily 5/20/2024 at 1703    latanoprost (XALATAN) 0.005 % ophthalmic solution Place 1 drop into both eyes At Bedtime  5/19/2024 at 1901    multivitamin, therapeutic with minerals (MULTI-VITAMIN) TABS tablet Take 1 tablet by mouth daily 5/20/2024 at 0732    omeprazole (PRILOSEC) 10 MG DR capsule Take 10 mg by mouth daily 5/20/2024 at 0732    polyethylene glycol (MIRALAX) 17 g packet Take 17 g by mouth daily as needed for constipation 1/16/2024    senna-docusate (SENOKOT-S/PERICOLACE) 8.6-50 MG tablet Take 1 tablet by mouth every other day 5/20/2024 at 0732    timolol (TIMOPTIC) 0.5 % ophthalmic solution Place 1 drop  into both eyes daily 5/20/2024 at 0732

## 2024-05-21 NOTE — PLAN OF CARE
"Goal Outcome Evaluation:      Plan of Care Reviewed With: patient, child    Overall Patient Progress: improvingOverall Patient Progress: improving    Outcome Evaluation: monitor mental status    PRIMARY DIAGNOSIS: GENERALIZED WEAKNESS    OUTPATIENT/OBSERVATION GOALS TO BE MET BEFORE DISCHARGE  1. Orthostatic performed: N/A    2. Tolerating PO medications: Yes    3. Return to near baseline physical activity: Yes, baseline lift and WC bound.     4. Cleared for discharge by consultants (if involved): No, ST still following and will advance diet if mentation/alertness improves.     Discharge Planner Nurse   Safe discharge environment identified: Yes  Barriers to discharge: Yes       Entered by: Janeth Fonseca RN 05/21/2024 12:06 PM     Please review provider order for any additional goals.   Nurse to notify provider when observation goals have been met and patient is ready for discharge.                                          Problem: Adult Inpatient Plan of Care  Goal: Plan of Care Review  Description: The Plan of Care Review/Shift note should be completed every shift.  The Outcome Evaluation is a brief statement about your assessment that the patient is improving, declining, or no change.  This information will be displayed automatically on your shift  note.  5/21/2024 1206 by Janeth Fonseca RN  Outcome: Progressing  Flowsheets (Taken 5/21/2024 1206)  Outcome Evaluation: monitor mental status  Plan of Care Reviewed With:   patient   child  Overall Patient Progress: improving  5/21/2024 0918 by Janeth Fonseca RN  Outcome: Progressing  Flowsheets (Taken 5/21/2024 0918)  Outcome Evaluation: ST following for diet advancement, monitor mental status  Plan of Care Reviewed With:   patient   child  Overall Patient Progress: improving  Goal: Patient-Specific Goal (Individualized)  Description: You can add care plan individualizations to a care plan. Examples of Individualization might be:  \"Parent " "requests to be called daily at 9am for status\", \"I have a hard time hearing out of my right ear\", or \"Do not touch me to wake me up as it startles  me\".  5/21/2024 1206 by Janeth Fonseca RN  Outcome: Progressing  5/21/2024 0918 by Janeth Fonseca RN  Outcome: Progressing  Goal: Absence of Hospital-Acquired Illness or Injury  5/21/2024 1206 by Janeth Fonseca RN  Outcome: Progressing  5/21/2024 0918 by Janeth Fonseca RN  Outcome: Progressing  Intervention: Identify and Manage Fall Risk  Recent Flowsheet Documentation  Taken 5/21/2024 0729 by Janeth Fonseca RN  Safety Promotion/Fall Prevention:   activity supervised   clutter free environment maintained   nonskid shoes/slippers when out of bed   patient and family education   safety round/check completed  Intervention: Prevent and Manage VTE (Venous Thromboembolism) Risk  Recent Flowsheet Documentation  Taken 5/21/2024 0729 by Janeth Fonseca RN  VTE Prevention/Management: compression stockings on  Intervention: Prevent Infection  Recent Flowsheet Documentation  Taken 5/21/2024 0729 by Janeth Fonseca RN  Infection Prevention:   rest/sleep promoted   single patient room provided   hand hygiene promoted  Goal: Optimal Comfort and Wellbeing  5/21/2024 1206 by Janeth Fonseca RN  Outcome: Progressing  5/21/2024 0918 by Janeth Fonseca RN  Outcome: Progressing  Goal: Readiness for Transition of Care  5/21/2024 1206 by Janeth Fonseca RN  Outcome: Progressing  5/21/2024 0918 by Janeth Fonseca RN  Outcome: Progressing     "

## 2024-05-21 NOTE — ED TRIAGE NOTES
Patient arrives via EMS from care facility. Pt reported to have choking episode with difficulty breathing. EMS noted stridor and gurgling. O2 sats were 92 on room air and placed on 4L O2 N/C prior to arrival. EMS administered glucagon prior to arrival after calling the ED and speaking to a provider.      Triage Assessment (Adult)       Row Name 05/20/24 1312          Triage Assessment    Airway WDL X        Respiratory WDL    Respiratory WDL X

## 2024-05-22 NOTE — PROVIDER NOTIFICATION
Notified Elodia at 8:36 PM regarding /88 has Norvasc PO. Patient is unable to swallow the pill.    Spoke with: paged via [employer] Metoprolol Iv form.    Comments:

## 2024-05-22 NOTE — PLAN OF CARE
"PRIMARY DIAGNOSIS: \"GENERIC\" NURSING  OUTPATIENT/OBSERVATION GOALS TO BE MET BEFORE DISCHARGE:  ADLs back to baseline:  Pt going home on hospice    Activity and level of assistance: Assist of 2 with lift    Pain status:No pain.     Return to near baseline physical activity: Yes     Discharge Planner Nurse   Safe discharge environment identified: Yes  Barriers to discharge: Yes       Entered by: Sepideh Cotto RN 05/22/2024 6:56 PM   Provided cares for pt from [id number]. Pt disoriented x4. Denied pain. BP elevated, PA aware. Congested cough. Family helped pt eat. Purwick in place, voiding.   Please review provider order for any additional goals.   Nurse to notify provider when observation goals have been met and patient is ready for discharge.      Plan of Care Reviewed With: patient    Overall Patient Progress: improvingOverall Patient Progress: improving    Outcome Evaluation: disoriented x4, congested cough, family helped pt eat dinner, BP elevated PA aware      Problem: Adult Inpatient Plan of Care  Goal: Plan of Care Review  Description: The Plan of Care Review/Shift note should be completed every shift.  The Outcome Evaluation is a brief statement about your assessment that the patient is improving, declining, or no change.  This information will be displayed automatically on your shift  note.  Outcome: Progressing  Flowsheets (Taken 5/22/2024 7702)  Outcome Evaluation: disoriented x4, congested cough, family helped pt eat dinner, BP elevated PA aware  Plan of Care Reviewed With: patient  Overall Patient Progress: improving  Goal: Patient-Specific Goal (Individualized)  Description: You can add care plan individualizations to a care plan. Examples of Individualization might be:  \"Parent requests to be called daily at 9am for status\", \"I have a hard time hearing out of my right ear\", or \"Do not touch me to wake me up as it startles  me\".  Outcome: Progressing  Goal: Absence of Hospital-Acquired Illness or " Injury  Outcome: Progressing  Intervention: Identify and Manage Fall Risk  Recent Flowsheet Documentation  Taken 5/22/2024 1827 by Sepideh Cotto, RN  Safety Promotion/Fall Prevention:   activity supervised   clutter free environment maintained   increased rounding and observation   nonskid shoes/slippers when out of bed   room door open   room near nurse's station   room organization consistent   safety round/check completed   supervised activity  Intervention: Prevent Skin Injury  Recent Flowsheet Documentation  Taken 5/22/2024 1827 by Sepideh Cotto RN  Body Position:   turned   right  Goal: Optimal Comfort and Wellbeing  Outcome: Progressing  Goal: Readiness for Transition of Care  Outcome: Progressing

## 2024-05-22 NOTE — PLAN OF CARE
Goal Outcome Evaluation:      Plan of Care Reviewed With: patient, child    Overall Patient Progress: no changeOverall Patient Progress: no change    Outcome Evaluation: Pt alert today- disoriented x4, speech garbles and difficult to understand with moments of clear speech. Strict NPO- awaiting speech to re-eval. Congested frequent cough-weak and unable to clear. Turn/rep q2. IV rocephin for aspiration pneumonitis. Weened O2- 92% on RA.

## 2024-05-22 NOTE — CONSULTS
Palliative Care Consultation Note  [facility]      Patient: Delmis Quarles  Date of Admission:  5/20/2024    Requesting Clinician / Team: anna marie  Reason for consult: Goals of care  Decisional support  Patient and family support       Recommendations & Counseling     GOALS OF CARE:   Comfort focused   Back to [place] with hospice  Symptom management as things change in her condition    ADVANCE CARE PLANNING:  Patient has an advance directive dated 11/6/2002.  Primary Health Care Agent Augustine Rueda, and Denise Vanessa.   There is a POLST form on file, this was reviewed and current.  Code status: No CPR- Do NOT Intubate    MEDICAL MANAGEMENT:   Comfort Care    #Pain  Oxycodone intensol 2.5-5 mg every 2 hours PRN- does not need to be able to swallow to take  Toradol every 6 hours PRN    #Dyspnea   Oxycodone intensol 2.5-5 mg every 2 hours PRN- does not need to be able to swallow to take    #Anxiety    1st choice: Lorazepam PO/SL q 1 mg  q 3 hour as needed.   2nd choice: Lorazepam IV q 1 mg  q 3 hour as needed    #Secretion burden   Robinul 0.1 mg (PO/IV) q 4 hours as needed. If ineffective, increase up to 0.3 mg   Consider atropine if Robinul ineffective after dose increase      #Nausea   Zofran 4 mg q 6 hours as needed. Can increase to 8 mg   Zyprexa 5 mg q 6 hours as needed     #Agitation  Aggressive symptoms control first, then  1st choice: Zyprexa 5 mg ODT or IM   2nd choice: Increase dose of Zyprexa to 10 mg or consider switch to Haldol   3rd choice: Lorazepam as above     PSYCHOSOCIAL/SPIRITUAL SUPPORT:  Family   [place] community: [employer]  Would appreciate Spiritual Health Services, Consult has been placed for support     Palliative Care will continue to follow. Thank you for the consult and allowing us to aid in the care of Delmis Quarles.    These recommendations have been discussed with medical team.    Birdie Hill NP  [facility], Palliative [facility]  Securely  message with the [employer] Web Console (learn more here) or  Text page via [employer] Paging/Directory         Assessment      Delmis Quarles is a 89 year old female with a past medical history of dementia, HTN, CKD stage IIIa, DM 2, neuropathy, foot ulcers, PUD, anemia, glaucoma, and pneumonia, who presented on 5/20 with choking episode at [facility].      Today, the patient was seen for:  Goals of care    History of Present Illness   Met with patient's two daughters and NAN at the bedside.   I introduced our role as an extra layer of support and how we help patients and families dealing with serious, potentially life-limiting illnesses. I explained the composition of the palliative care team.  Palliative care helps patients and families navigate their care while focusing on the whole person; providing emotional, social and spiritual support  Palliative care often assists with symptom management, information sharing about what to expect from the illness, available treatment options and what effect those options may have on the disease course, and provide effective communication and caring support.    Prognosis, Goals, & Planning:   Functional Status just prior to this current hospitalization:  Wheelchair bound  No dysphagia prior to hospitalization  Worsening memory over the last 1 year, prior to that mild cognitive changes    Prognosis, Goals, and/or Advance Care Planning:  Education provided on transition to comfort-focused goals of care would be including discontinuation of IV fluids, cardiac monitoring, labs, tube feeding, TPN and other interventions that do not directly promote comfort.  Anticipatory guidance was given regarding feeding, hunger, fluids at end of life. We discussed utilization of medications to ease air hunger, agitation and restlessness at the time that ventilator is compassionately withdrawn.  Discussed that this process is very purposeful in terms of ensuring patient is as comfortable as possible  and that family wishes are honored.  Education provided regarding hospice philosophy, prognostic,and eligibility criteria. Discussed what services are provided and those that are not,  Discussed common misconceptions. We explored the various disposition options where they can receive hospice care (home, residential hospice homes, LTC with hospice) including subsequent financial and familial implications. Discussed typical anticipated timing of discharge.    Code Status was addressed today:   yes          Patient has decision-making capacity today for complex decisions: Unreliable          Coping, Meaning, & Spirituality:   Mood, coping, and/or meaning in the context of serious illness were addressed today: Yes    Social:   Living situation:resides in a nursing home [facility]    Medications:  Reviewed this patient's medication profile and medications from this hospitalization. Notable medications: none   [place] Board of Pharmacy Data Base Reviewed: Yes:   reviewed - no record of controlled substances prescribed.    ROS:  Comprehensive ROS is reviewed and is negative except as here & per HPI:     Physical Exam   Vital Signs with Ranges  Temp:  [98  F (36.7  C)-100.7  F (38.2  C)] 98.3  F (36.8  C)  Pulse:  [65-95] 80  Resp:  [14-23] 15  BP: (112-182)/(54-89) 161/85  SpO2:  [92 %-98 %] 95 %  Wt Readings from Last 10 Encounters:   05/21/24 71.9 kg (158 lb 8.2 oz)   12/01/18 63.5 kg (140 lb)   08/21/18 71.5 kg (157 lb 10.1 oz)   11/29/16 71.6 kg (157 lb 12.8 oz)   09/06/16 71.3 kg (157 lb 1.6 oz)   08/26/16 70.8 kg (156 lb)   06/29/16 72.6 kg (160 lb)   02/29/16 72.7 kg (160 lb 4.8 oz)   02/19/16 72.1 kg (159 lb)   11/17/15 67.1 kg (148 lb)     158 lbs 8.17 oz    PHYSICAL EXAM:  Constitutional: alert   Cardiovascular: negative  Respiratory: positive findings: congested weak cough, secretions  Psychiatric: confused  Abdomen: Abdomen soft, non-tender. BS normal. No masses, organomegaly    Data reviewed:  Results  for orders placed or performed during the hospital encounter of 05/20/24 (from the past 24 hour(s))   Lactic Acid Whole Blood w/ 1x repeat in 2 hrs when >2   Result Value Ref Range    Lactic Acid, Initial 0.9 0.7 - 2.0 mmol/L   Blood Culture Arm, Left    Specimen: Arm, Left; Blood   Result Value Ref Range    Culture No growth after 12 hours    Blood Culture Hand, Right    Specimen: Hand, Right; Blood   Result Value Ref Range    Culture No growth after 12 hours    Lactic Acid Whole Blood w/ 1x repeat in 2 hrs when >2   Result Value Ref Range    Lactic Acid, Initial 1.0 0.7 - 2.0 mmol/L       Medical Decision Making       Medical complexity over the past 24 hours:  - Decision to DE-ESCALATE CARE based on prognosis  80 MINUTES SPENT BY ME on the date of service doing chart review, history, exam, documentation & further activities per the note.      Advance Care Planning Discussion 5/22/2024. Birdie HERNANDEZ NP met with The Health Care Agent(s) today at the hospital to discuss Advance Care Planning. Delmis Quarles does not have decisional capacity  and was not present for this discussion due to confusion .  Those present were informed of the voluntary nature of this discussion and wished to proceed.  The discussion included:  wishes and worries, goals of care, function status, code status, discharge planning . This discussion began at 1515 and ended at 1535 for a total of 20 minutes.

## 2024-05-22 NOTE — CONSULTS
Care Management Discharge Note    Discharge Date: 05/23/2024     Discharge Disposition: [facility]     Discharge Services: Hospice    Discharge Transportation:  Stretcher    Private pay costs discussed: transportation costs    Does the patient's insurance plan have a 3 day qualifying hospital stay waiver?  No    PAS Confirmation Code:  N/A-admitted from facility  Patient/family educated on Medicare website which has current facility and service quality ratings:  N/A-admitted from facility    Patient/Family in Agreement with the Plan: Yes     Handoff Referral Completed: No    Additional Information:  EDWIN spoke with [facility] admissions. Confirmed bed hold. Patient is WC bound and transfers with a nel lift at baseline. Palliative consulted, family has elected hospice at LTC facility.     EDWIN met with brent Walker at bedside. Family does not have a hospice agency preference. [facility] works most closely with [facility].     EDWIN placed call to [facility], [phone]. Initial referral sent via [employer]. Intake will review and return call to EDWIN.     Reviewed out of pocket cost for [facility] stretcher transport, $1117.00 for base rate and $26.06 per mile to the destination. Pt/family expressed understanding and are agreeable to this.      Patient requires stretcher transportation due to comfort measures, requiring supervision.    Stretcher transportation has been arranged for [id number] tomorrow, 5/23. Patient's daughter and LTC facility notified of transport time.     Ambulance PCS form completed and placed in patient chart. Ambulance PCS form should be given to transportation team.    Palliative care ordering 3 days of comfort meds to be filled and sent with.     EDWIN will continue to follow.     1500: EDWIN received call from Samantha at [facility], [phone]. They anticipate they can do an admission tomorrow. They will call daughter Denise and EDWIN once an admission time is confirmed for tomorrow.      ARELI Bang, [facility]   Inpatient Care Coordination  [facility]   [phone]

## 2024-05-22 NOTE — PROGRESS NOTES
[facility]  Hospitalist Progress Note  Alize Middleton PA-C 05/22/2024    Reason for Stay (Diagnosis): palliative care          Assessment and Plan:      Delmis Quarles is a 89 year old female with PMH including dementia, HTN, CKD stage IIIa, DM 2, neuropathy, foot ulcers, PUD, anemia, glaucoma, and pneumonia who presents via EMS from her care facility for choking episodes after eating.      #Acute hypoxemic respiratory failure.  Aspiration pneumonitis.  Known esophageal stricture s/p dilation decades ago: Brought in from care facility due to choking after eating rice pudding and bread on her way back to her room.  Per EMS she had gurgling and stridor and she was suctioned and route.  O2 sats 92% on 4 L.  Chest x-ray shows possible right perihilar opacity although this is subtle.  Afebrile and no leukocytosis.  Suspect she may have some aspiration pneumonitis.  At baseline she is on a regular diet.    -Dtr Denise does tell me that her mother is comfort cares which is consistent with her POLST form.  She is okay with IV fluids and antibiotics but no escalation of care or invasive procedures.  She would not be interested in any sort of EGD with dilation.  -During hospitalization she was seen by Speech and recommended FLD for now and being upright and she had some waxing waning episodes of dysphagia. Family is aware and essentially believes that the pt would want comfort cares.   - Requested Palliative care to see.   -They have agreed on comfort cares going forward with hospice at discharge   - Will coordinate discharge tomorrow   - Non pertinent meds discontinued   - comfort eating, regular diet   - She has been on IV rocephin for possible aspiration pneumonitis, family is in favor of continuing this while in house but also would want to complete treatment  - will change to liquid Augmentin at discharge if tolerated, if not, will cont not to treat      #Dementia: Was in assisted living.  Has a  private room which daughter is concerned patient may lose if she has prolonged hospitalization.  She is able to have a conversation but memory is poor per daughter.  She is clearly not at baseline as of yet.     #Glaucoma: Continue her eyedrops.  #CKD2: Creatinine at baseline.  Monitor.  #HTN: Low-dose amlodipine, now discontinued      DVT Prophylaxis: Pneumatic Compression Devices  Code Status: No CPR- Do NOT Intubate  Dispo: Hopeful the patient can discharge back to her facility tomorrow with mental status getting closer to baseline.           Interval History (Subjective):      States she's hungry and wants to eat                   Physical Exam:      Last Vital Signs:  BP (!) 171/82 (BP Location: Right arm)   Pulse 76   Temp 99.9  F (37.7  C) (Axillary)   Resp 16   Wt 71.9 kg (158 lb 8.2 oz)   SpO2 91%   BMI 28.99 kg/m        Constitutional: Awake, alert, cooperative, no apparent distress     Respiratory: Ronchus BS, dec at bases, no crackles or wheezing   Cardiovascular: Regular rate and rhythm, normal S1 and S2, and no murmur noted   Abdomen: Normal bowel sounds, soft, non-distended, non-tender   Skin: No rashes, no cyanosis, dry to touch   Neuro: Alert and oriented x3, no weakness, numbness, memory loss   Extremities: No edema, normal range of motion   Other(s):        All other systems: Negative          Medications:      All current medications were reviewed with changes reflected in problem list.         Data:      All new lab and imaging data was reviewed.   Labs:       Lab Results   Component Value Date     05/20/2024     04/02/2024     12/08/2023     12/02/2018     12/01/2018     08/21/2018    Lab Results   Component Value Date    CHLORIDE 103 05/20/2024    CHLORIDE 108 04/02/2024    CHLORIDE 108 12/08/2023    CHLORIDE 111 04/18/2022    CHLORIDE 115 03/10/2022    CHLORIDE 113 03/08/2022    CHLORIDE 105 12/02/2018    CHLORIDE 104 12/01/2018    CHLORIDE 101  08/21/2018    Lab Results   Component Value Date    BUN 32.2 05/20/2024    BUN 28.7 04/02/2024    BUN 41.0 12/08/2023    BUN 41 04/18/2022    BUN 36 03/10/2022    BUN 39 03/08/2022    BUN 13 12/02/2018    BUN 14 12/01/2018    BUN 16 08/21/2018      Lab Results   Component Value Date    POTASSIUM 3.8 05/20/2024    POTASSIUM 3.9 04/02/2024    POTASSIUM 4.3 12/08/2023    POTASSIUM 4.4 04/18/2022    POTASSIUM 4.6 03/10/2022    POTASSIUM 4.9 03/08/2022    POTASSIUM 3.5 12/02/2018    POTASSIUM 4.0 12/01/2018    POTASSIUM 4.2 08/21/2018    Lab Results   Component Value Date    CO2 21 05/20/2024    CO2 24 04/02/2024    CO2 20 12/08/2023    CO2 21 04/18/2022    CO2 19 03/10/2022    CO2 18 03/08/2022    CO2 26 12/02/2018    CO2 29 12/01/2018    CO2 26 08/21/2018    Lab Results   Component Value Date    CR 1.10 05/20/2024    CR 1.08 04/02/2024    CR 1.06 12/08/2023    CR 0.79 12/02/2018    CR 0.81 12/01/2018    CR 0.75 08/21/2018        Recent Labs   Lab 05/20/24  1926   WBC 8.9   HGB 12.8   HCT 40.4            Imaging:   Results for orders placed or performed during the hospital encounter of 05/20/24   XR Chest Port 1 View    Narrative    EXAM: XR CHEST PORT 1 VIEW  LOCATION: [facility]  DATE: 5/20/2024    INDICATION: Choking.  COMPARISON: Chest radiograph 12/3/2018      Impression    IMPRESSION: Low lung volumes with bronchovascular crowding. Right perihilar linear opacity favoring atelectasis. No focal consolidation, significant pleural effusion or pneumothorax. No radiodense foreign object overlying the mediastinum. Normal heart   size. Left shoulder degenerative changes.

## 2024-05-22 NOTE — PLAN OF CARE
"Goal Outcome Evaluation:    Problem: Adult Inpatient Plan of Care  Goal: Plan of Care Review  Description: The Plan of Care Review/Shift note should be completed every shift.  The Outcome Evaluation is a brief statement about your assessment that the patient is improving, declining, or no change.  This information will be displayed automatically on your shift  note.  Outcome: Progressing  Flowsheets (Taken 5/21/2024 2200)  Outcome Evaluation: Patient is alert to self, moaning and occasionally coughing , BP eleved has difficult swallowing pills. Provider paged (see orders) Patient is now rest/sleeping.  Plan of Care Reviewed With: patient  Overall Patient Progress: no change  Goal: Patient-Specific Goal (Individualized)  Description: You can add care plan individualizations to a care plan. Examples of Individualization might be:  \"Parent requests to be called daily at 9am for status\", \"I have a hard time hearing out of my right ear\", or \"Do not touch me to wake me up as it startles  me\".  Outcome: Progressing  Goal: Absence of Hospital-Acquired Illness or Injury  Outcome: Progressing  Intervention: Identify and Manage Fall Risk  Recent Flowsheet Documentation  Taken 5/21/2024 2139 by Sukhi Jason RN  Safety Promotion/Fall Prevention:   increase visualization of patient   increased rounding and observation  Intervention: Prevent Skin Injury  Recent Flowsheet Documentation  Taken 5/21/2024 2139 by Sukhi Jason RN  Body Position:   turned   right  Intervention: Prevent and Manage VTE (Venous Thromboembolism) Risk  Recent Flowsheet Documentation  Taken 5/21/2024 2139 by Sukhi Jason RN  VTE Prevention/Management: SCDs (sequential compression devices) off  Intervention: Prevent Infection  Recent Flowsheet Documentation  Taken 5/21/2024 2139 by Sukhi Jason RN  Infection Prevention: rest/sleep promoted  Goal: Optimal Comfort and Wellbeing  Outcome: Progressing  Intervention: Monitor Pain and Promote " Comfort  Recent Flowsheet Documentation  Taken 5/21/2024 2139 by Sukhi Jason, RN  Pain Management Interventions: medication (see MAR)  Goal: Readiness for Transition of Care  Outcome: Progressing       Plan of Care Reviewed With: patient    Overall Patient Progress: no changeOverall Patient Progress: no change    Outcome Evaluation: Patient is alert to self, moaning and occasionally coughing , BP eleved has difficult swalling pills. Provider paged (see orders) Patient is now rest/sleeping.

## 2024-05-22 NOTE — PLAN OF CARE
Goal Outcome Evaluation:      Plan of Care Reviewed With: patient, child    Overall Patient Progress: no changeOverall Patient Progress: no change    Outcome evaluation: Continues to be alert this shift. Family at bedside. Palliative consult. Cleared for reg diet for comfort with plan to discharge back to care facility tomorrow after 3 doses IV rocephin.

## 2024-05-22 NOTE — PROVIDER NOTIFICATION
Informed Middleton, PA that pt BP was 171/82.    Middleton, PA responded to wait and give evening metoprolol.

## 2024-05-22 NOTE — CONSULTS
"SPIRITUAL HEALTH SERVICES - Consult Note  [facility] Observation Unit    Referral Source: [facility] consult; family requested  support for pt.    Pt Delmis' daughter Denise and son-in-law Edmund were at the bedside.  Delmis was not able to talk much.  She named her son and three daughters as being core to her support network.  Denise reported that they have many Rastafari pastors in their family and that Delmis' spouse was a lay .  Delmis reported that she is 89 and reflected that \"it just happened.\"  She welcomed prayer.  Denise shared that Delmis will return to her halfway with Hospice and expressed gratitude for having this time to share memories and be together.    Plan: No further plans as pt's family anticipates that pt will discharge tomorrow with Hospice;  support will be available through Hospice.    Lb Daigle M.Div., [facility]  Staff     SHS available 24/7 for emergent requests/referrals, either by paging the on-call  or by entering an ASAP/STAT consult in [employer], which will also page the on-call .   "

## 2024-05-22 NOTE — PROVIDER NOTIFICATION
"Notified Elodia at 9:27 PM regarding  Patient is moaning when asked if she has pain answered \"yES\". No pain control on file    Spoke with: paged via [employer]    Orders IV Toradol    Comments:         "

## 2024-05-22 NOTE — PROGRESS NOTES
Notified by nursing of hypertension and difficulty swallowing and unable to cough up secretions. Pt answers yes to questions about pain.     Upon chart review, pt admitted for possible aspiration, received abx in ED but held on admission as pneumonia was felt unlikely at this point. Developed fever this afternoon, 100.6. Per note, pt is comfort cares but daughter is ok with abx and fluids. Will order blood cultures. Rocephin ordered earlier today, continue.    Will make pt NPO and have SLP and provider reassess in AM. IV Toradol added for pain. Avoiding narcotics as pt was somnolent after sedation in ED and is slowly recovering.     Elodia Medel PA-C

## 2024-05-23 NOTE — DISCHARGE SUMMARY
[facility]  Discharge Summary        Delmis Quarles MRN# [id number]   YOB: 1924 Age: 89 year old     Date of Admission:  5/20/2024  Date of Discharge:  5/23/2024 11:03 AM  Admitting Physician:  Jamil Auguste MD  Discharge Physician: Alize Middleton PA-C  Discharging Service: Hospitalist     Primary Provider: Sherry Monteiro  Primary Care Physician Phone Number: [phone]         Discharge Diagnoses/Problem Oriented Hospital Course (Providers):      Delmis Quarles was admitted on 5/20/2024 by Jamil Auguste MD and I would refer you to their history and physical.  The following problems were addressed during her hospitalization:    Acute hypoxic respiratory failure, suspect aspiration pneumonitis   Known esophageal stricture s/p dilation decades ago   Dementia   Glaucoma   CKD stage 2  HTN   Desire for comfort care, Hospice at discharge          Code Status:      DNR / DNI        Brief Hospital Stay Summary Sent Home With Patient in AVS:          Reason for your hospital stay      You were admitted for concerns of choking episodes. You were placed on IV   antibiotics and labs remained stable. You were seen by Palliative care and   will be discharging back to [facility] with hospice.        Delmis Quarles is a 89 year old female with PMH including dementia, HTN, CKD stage IIIa, DM 2, neuropathy, foot ulcers, PUD, anemia, glaucoma, and pneumonia who presents via EMS from her care facility for choking episodes after eating.      #Acute hypoxemic respiratory failure.  Aspiration pneumonitis.  Known esophageal stricture s/p dilation decades ago: Brought in from care facility due to choking after eating rice pudding and bread on her way back to her room.  Per EMS she had gurgling and stridor and she was suctioned and route.  O2 sats 92% on 4 L.  Chest x-ray shows possible right perihilar opacity although this is subtle.  Afebrile and no leukocytosis.  Suspect she  may have some aspiration pneumonitis.  At baseline she is on a regular diet.    -Dtr Denise does tell me that her mother is comfort cares which is consistent with her POLST form.  -During hospitalization she was seen by Speech and recommended FLD for now and being upright and she had some waxing waning episodes of dysphagia. Family is aware and essentially believes that the pt would want comfort cares.   - Requested Palliative care to see.   -They have agreed on comfort cares going forward with hospice at discharge   - Non pertinent meds discontinued   - comfort eating, regular diet   - She has completed 3 days of abx, at this time given possible aspiration precautions, there is no need to cont abx at discharge      #Dementia: back to home with Hospice    #Glaucoma: Continue her eyedrops.  #CKD2: Creatinine at baseline.  Monitor.  #HTN: Low-dose amlodipine, now discontinued             Important Results:        Recent Labs   Lab 05/20/24 1926   WBC 8.9   HGB 12.8   HCT 40.4           Recent Labs   Lab 05/20/24 1926      POTASSIUM 3.8   CHLORIDE 103   CO2 21*   ANIONGAP 15   *   BUN 32.2*   CR 1.10*   GFRESTIMATED 45*   MAKI 9.5     7-Day Micro Results       Collected Updated Procedure Result Status      05/21/2024 2151 05/23/2024 0016 Blood Culture Arm, Left [[id number]]   Blood from Arm, Left    Preliminary result Component Value   Culture No growth after 1 day  [P]                05/21/2024 2151 05/23/2024 0016 Blood Culture Hand, Right [[id number]]   Blood from Hand, Right    Preliminary result Component Value   Culture No growth after 1 day  [P]                               Pending Results:        Unresulted Labs Ordered in the Past 30 Days of this Admission       Date and Time Order Name Status Description    5/21/2024  9:30 PM Blood Culture Hand, Right Preliminary     5/21/2024  9:30 PM Blood Culture Arm, Left Preliminary               Discharge Instructions and Follow-Up:       Follow-up Appointments     Follow-up and recommended labs and tests       Follow up with Hospice.              Discharge Disposition:        Discharged to home         Discharge Medications:        Current Discharge Medication List        START taking these medications    Details   acetaminophen (TYLENOL) 650 MG suppository Place 1 suppository (650 mg) rectally every 4 hours as needed for fever  Qty: 2 suppository, Refills: 0    Associated Diagnoses: Aspiration pneumonitis (H); Hospice care patient      atropine 1 % ophthalmic solution Place 2 drops under the tongue every 2 hours as needed for secretions  Qty: 2 mL, Refills: 0    Associated Diagnoses: Aspiration pneumonitis (H); Hospice care patient      bisacodyl (DULCOLAX) 10 MG suppository Place 1 suppository (10 mg) rectally daily as needed for other (for no bowel movement for 72 hours)  Qty: 2 suppository, Refills: 0    Associated Diagnoses: Aspiration pneumonitis (H); Hospice care patient      haloperidol (HALDOL) 2 MG/ML (HIGH CONC) solution Take 0.25 mLs (0.5 mg) by mouth every 6 hours as needed for agitation (hallucinations)  Qty: 15 mL, Refills: 0    Associated Diagnoses: Aspiration pneumonitis (H); Hospice care patient      LORazepam (ATIVAN) 2 MG/ML (HIGH CONC) oral solution Take 0.25 mLs (0.5 mg) by mouth every 4 hours as needed for anxiety  Qty: 30 mL, Refills: 0    Associated Diagnoses: Aspiration pneumonitis (H); Hospice care patient      oxyCODONE (ROXICODONE INTENSOL) 20 mg/mL (HIGH CONC) solution Take 0.125 mLs (2.5 mg) by mouth every 2 hours as needed (pain/SOB)  Qty: 30 mL, Refills: 0    Associated Diagnoses: Aspiration pneumonitis (H); Hospice care patient           CONTINUE these medications which have NOT CHANGED    Details   acetaminophen (TYLENOL) 500 MG tablet Take 1,000 mg by mouth 3 times daily      diclofenac (VOLTAREN) 1 % topical gel Apply 2-4 g topically 2 times daily Apply 4g to knees.  Apply 2g to arms/shoulder.      ketotifen  fumarate 0.035% 0.035 % SOLN ophthalmic solution Place 1 drop into both eyes 2 times daily      latanoprost (XALATAN) 0.005 % ophthalmic solution Place 1 drop into both eyes At Bedtime       polyethylene glycol (MIRALAX) 17 g packet Take 17 g by mouth daily as needed for constipation      senna-docusate (SENOKOT-S/PERICOLACE) 8.6-50 MG tablet Take 1 tablet by mouth every other day      timolol (TIMOPTIC) 0.5 % ophthalmic solution Place 1 drop into both eyes daily           STOP taking these medications       amLODIPine (NORVASC) 2.5 MG tablet Comments:   Reason for Stopping:         estradiol (VAGIFEM) 10 MCG TABS vaginal tablet Comments:   Reason for Stopping:         multivitamin, therapeutic with minerals (MULTI-VITAMIN) TABS tablet Comments:   Reason for Stopping:         omeprazole (PRILOSEC) 10 MG DR capsule Comments:   Reason for Stopping:                 Allergies:         Allergies   Allergen Reactions    Trospium Swelling     Lower extremity edema    Morphine And Codeine      dry mouth and nausea    Penicillins      dry mouth    Sulfa Antibiotics      dry mouth           Consultations This Hospital Stay:        Consultation during this admission received from Palliative care          Condition and Physical on Discharge:        Discharge condition: Stable   Vitals: Blood pressure (!) 162/75, pulse 75, temperature 99.1  F (37.3  C), resp. rate 20, weight 71.9 kg (158 lb 8.2 oz), SpO2 93%, not currently breastfeeding.  158 lbs 8.17 oz      GENERAL:  Comfortable.  PSYCH: pleasant, oriented, No acute distress.  HEENT:  PERRLA. Normal conjunctiva, normal hearing, nasal mucosa and Oropharynx are normal.  NECK:  Supple, no neck vein distention, adenopathy or bruits, normal thyroid.  HEART:  Normal S1, S2 with no murmur, no pericardial rub, gallops or S3 or S4.  LUNGS:  Clear to auscultation, normal Respiratory effort. No wheezing, rales or ronchi.  ABDOMEN:  Soft, no hepatosplenomegaly, normal bowel sounds.  Non-tender, non distended.   EXTREMITIES:  No pedal edema, +2 pulses bilateral and equal.  SKIN:  Dry to touch, No rash, wound or ulcerations.  NEUROLOGIC:  CN 2-12 intact, BL 5/5 symmetric upper and lower extremity strength, sensation is intact with no focal deficits.           Discharge Time:      > 30 mins         Image Results From This Hospital Stay (For Non-EPIC Providers):        Results for orders placed or performed during the hospital encounter of 05/20/24   XR Chest Port 1 View    Narrative    EXAM: XR CHEST PORT 1 VIEW  LOCATION: [facility]  DATE: 5/20/2024    INDICATION: Choking.  COMPARISON: Chest radiograph 12/3/2018      Impression    IMPRESSION: Low lung volumes with bronchovascular crowding. Right perihilar linear opacity favoring atelectasis. No focal consolidation, significant pleural effusion or pneumothorax. No radiodense foreign object overlying the mediastinum. Normal heart   size. Left shoulder degenerative changes.

## 2024-05-23 NOTE — PROGRESS NOTES
Speech Language Therapy Discharge Summary    Reason for therapy discharge:    No further expectations of functional progress.  Change in medical status.  Transitioning to hospice care.    Progress towards therapy goal(s). See goals on Care Plan in [facility] health record for goal details.  Goals not met.  Barriers to achieving goals:   limited tolerance for therapy.    Therapy recommendation(s):    No further therapy is recommended.    Diet level has been liberalized to regular textures and thin liquids for comfort/quality of life.

## 2024-05-23 NOTE — PROGRESS NOTES
Patient is Alert to and Self. Unable to sleep c/o throat pain Prn toradol is given repositioned. Patient able to sleep. 02 88% .5L O2 is 93 now.

## 2024-05-23 NOTE — PLAN OF CARE
"Goal Outcome Evaluation:    Problem: Adult Inpatient Plan of Care  Goal: Plan of Care Review  Description: The Plan of Care Review/Shift note should be completed every shift.  The Outcome Evaluation is a brief statement about your assessment that the patient is improving, declining, or no change.  This information will be displayed automatically on your shift  note.  5/23/2024 0705 by Sukhi Jason RN  Outcome: Progressing  Flowsheets (Taken 5/23/2024 0600)  Plan of Care Reviewed With: patient  Overall Patient Progress: no change  5/22/2024 2007 by Sukhi Jason RN  Outcome: Progressing  Flowsheets (Taken 5/22/2024 2007)  Outcome Evaluation: Patient is alert to selft congested cough provider aware,  Plan of Care Reviewed With: patient  Overall Patient Progress: no change  Goal: Patient-Specific Goal (Individualized)  Description: You can add care plan individualizations to a care plan. Examples of Individualization might be:  \"Parent requests to be called daily at 9am for status\", \"I have a hard time hearing out of my right ear\", or \"Do not touch me to wake me up as it startles  me\".  5/23/2024 0705 by Sukhi Jason RN  Outcome: Progressing  5/22/2024 2007 by Sukhi Jason RN  Outcome: Progressing  Goal: Absence of Hospital-Acquired Illness or Injury  5/23/2024 0705 by Sukhi Jason RN  Outcome: Progressing  5/22/2024 2007 by Sukhi Jason RN  Outcome: Progressing  Intervention: Identify and Manage Fall Risk  Recent Flowsheet Documentation  Taken 5/23/2024 0000 by Sukhi Jason RN  Safety Promotion/Fall Prevention:   increase visualization of patient   increased rounding and observation  Taken 5/22/2024 2002 by Sukhi Jason RN  Safety Promotion/Fall Prevention:   increase visualization of patient   increased rounding and observation  Intervention: Prevent Skin Injury  Recent Flowsheet Documentation  Taken 5/23/2024 0000 by Sukhi Jason RN  Body Position:   turned   left  Taken 5/22/2024 2002 by " Sukhi Jason RN  Body Position:   turned   left  Intervention: Prevent and Manage VTE (Venous Thromboembolism) Risk  Recent Flowsheet Documentation  Taken 5/23/2024 0000 by Sukhi Jason RN  VTE Prevention/Management: SCDs (sequential compression devices) off  Taken 5/22/2024 2002 by Sukhi Jason RN  VTE Prevention/Management: SCDs (sequential compression devices) off  Intervention: Prevent Infection  Recent Flowsheet Documentation  Taken 5/23/2024 0000 by Sukhi Jason RN  Infection Prevention: rest/sleep promoted  Taken 5/22/2024 2002 by Sukhi Jason RN  Infection Prevention: rest/sleep promoted  Goal: Optimal Comfort and Wellbeing  5/23/2024 0705 by Sukhi Jason RN  Outcome: Progressing  5/22/2024 2007 by Sukhi Jason RN  Outcome: Progressing  Intervention: Monitor Pain and Promote Comfort  Recent Flowsheet Documentation  Taken 5/22/2024 2007 by Sukhi Jason RN  Pain Management Interventions:   back rub   breathing exercises   care clustered   repositioned   rest   relaxation techniques promoted  Taken 5/22/2024 2002 by Sukhi Jason RN  Pain Management Interventions:   quiet environment facilitated   relaxation techniques promoted   repositioned  Goal: Readiness for Transition of Care  5/23/2024 0705 by Sukhi Jason RN  Outcome: Progressing  5/22/2024 2007 by Sukhi Jason RN  Outcome: Progressing     Problem: Palliative Care  Goal: Enhanced Quality of Life  5/23/2024 0705 by Sukhi Jason RN  Outcome: Progressing  5/22/2024 2007 by Sukhi Jason RN  Outcome: Progressing  Intervention: Maximize Comfort  Flowsheets  Taken 5/22/2024 2007  Pain Management Interventions:   back rub   breathing exercises   care clustered   repositioned   rest   relaxation techniques promoted  Nutrition Interventions: diet adjustment recommended  Complementary Therapy: essential oils utilized  Oral Care:   lip/mouth moisturizer applied   mouth wash rinse   suction provided  Taken 5/22/2024 2002  Pain  Management Interventions:   quiet environment facilitated   relaxation techniques promoted   repositioned       Plan of Care Reviewed With: patient    Overall Patient Progress: no changeOverall Patient Progress: no change    Outcome Evaluation: Patient is alert to selft congested cough provider aware,

## 2024-05-23 NOTE — PLAN OF CARE
"Goal Outcome Evaluation:    Problem: Adult Inpatient Plan of Care  Goal: Plan of Care Review  Description:   Outcome: Progressing  Flowsheets (Taken 5/22/2024 2007)  Outcome Evaluation: Patient is alert to self congested and has occasionally cough provider aware,  Plan of Care Reviewed With: patient  Overall Patient Progress: no change  Goal: Patient-Specific Goal (Individualized)  Description: You can add care plan individualizations to a care plan. Examples of Individualization might be:  \"Parent requests to be called daily at 9am for status\", \"I have a hard time hearing out of my right ear\", or \"Do not touch me to wake me up as it startles  me\".  Outcome: Progressing  Goal: Absence of Hospital-Acquired Illness or Injury  Outcome: Progressing  Intervention: Identify and Manage Fall Risk  Recent Flowsheet Documentation  Taken 5/22/2024 2002 by Sukhi Jason RN  Safety Promotion/Fall Prevention:   increase visualization of patient   increased rounding and observation  Intervention: Prevent Skin Injury  Recent Flowsheet Documentation  Taken 5/22/2024 2002 by Sukhi Jason RN  Body Position:   turned   left  Intervention: Prevent and Manage VTE (Venous Thromboembolism) Risk  Recent Flowsheet Documentation  Taken 5/22/2024 2002 by Sukhi Jason RN  VTE Prevention/Management: SCDs (sequential compression devices) off  Intervention: Prevent Infection  Recent Flowsheet Documentation  Taken 5/22/2024 2002 by Sukhi Jason RN  Infection Prevention: rest/sleep promoted  Goal: Optimal Comfort and Wellbeing  Outcome: Progressing  Intervention: Monitor Pain and Promote Comfort  Recent Flowsheet Documentation  Taken 5/22/2024 2007 by Sukhi Jason RN  Pain Management Interventions:   back rub   breathing exercises   care clustered   repositioned   rest   relaxation techniques promoted  Goal: Readiness for Transition of Care  Outcome: Progressing     Problem: Palliative Care  Goal: Enhanced Quality of Life  Outcome: " Progressing  Intervention: Maximize Comfort  Flowsheets (Taken 5/22/2024 2007)  Pain Management Interventions:   back rub   breathing exercises   care clustered   repositioned   rest   relaxation techniques promoted  Nutrition Interventions: diet adjustment recommended  Complementary Therapy: essential oils utilized  Oral Care:   lip/mouth moisturizer applied   mouth wash rinse   suction provided       Plan of Care Reviewed With: patient    Overall Patient Progress: no changeOverall Patient Progress: no change    Outcome Evaluation: Patient is alert to selft congested cough provider aware,